# Patient Record
Sex: FEMALE | Race: WHITE | NOT HISPANIC OR LATINO
[De-identification: names, ages, dates, MRNs, and addresses within clinical notes are randomized per-mention and may not be internally consistent; named-entity substitution may affect disease eponyms.]

---

## 2017-01-04 ENCOUNTER — APPOINTMENT (OUTPATIENT)
Dept: INTERNAL MEDICINE | Facility: CLINIC | Age: 82
End: 2017-01-04

## 2017-01-04 VITALS
HEIGHT: 59 IN | TEMPERATURE: 98.2 F | WEIGHT: 130 LBS | HEART RATE: 93 BPM | SYSTOLIC BLOOD PRESSURE: 155 MMHG | DIASTOLIC BLOOD PRESSURE: 65 MMHG | BODY MASS INDEX: 26.21 KG/M2 | OXYGEN SATURATION: 94 %

## 2017-01-04 DIAGNOSIS — R07.89 OTHER CHEST PAIN: ICD-10-CM

## 2017-01-05 LAB
ALBUMIN SERPL ELPH-MCNC: 3.8 G/DL
ALP BLD-CCNC: 65 U/L
ALT SERPL-CCNC: 12 U/L
AMYLASE/CREAT SERPL: 111 U/L
ANION GAP SERPL CALC-SCNC: 11 MMOL/L
AST SERPL-CCNC: 14 U/L
BASOPHILS # BLD AUTO: 0.02 K/UL
BASOPHILS NFR BLD AUTO: 0.2 %
BILIRUB SERPL-MCNC: 0.5 MG/DL
BUN SERPL-MCNC: 24 MG/DL
CALCIUM SERPL-MCNC: 10.2 MG/DL
CHLORIDE SERPL-SCNC: 104 MMOL/L
CO2 SERPL-SCNC: 26 MMOL/L
CREAT SERPL-MCNC: 1.07 MG/DL
EOSINOPHIL # BLD AUTO: 0.06 K/UL
EOSINOPHIL NFR BLD AUTO: 0.6 %
GLUCOSE SERPL-MCNC: 101 MG/DL
HBA1C MFR BLD HPLC: 5.5 %
HCT VFR BLD CALC: 39 %
HGB BLD-MCNC: 12.1 G/DL
IMM GRANULOCYTES NFR BLD AUTO: 0.2 %
LPL SERPL-CCNC: 47 U/L
LYMPHOCYTES # BLD AUTO: 2.06 K/UL
LYMPHOCYTES NFR BLD AUTO: 20.7 %
MAN DIFF?: NORMAL
MCHC RBC-ENTMCNC: 29.3 PG
MCHC RBC-ENTMCNC: 31 GM/DL
MCV RBC AUTO: 94.4 FL
MONOCYTES # BLD AUTO: 0.8 K/UL
MONOCYTES NFR BLD AUTO: 8.1 %
NEUTROPHILS # BLD AUTO: 6.97 K/UL
NEUTROPHILS NFR BLD AUTO: 70.2 %
PLATELET # BLD AUTO: 349 K/UL
POTASSIUM SERPL-SCNC: 4.2 MMOL/L
PROT SERPL-MCNC: 6.3 G/DL
RBC # BLD: 4.13 M/UL
RBC # FLD: 14 %
SODIUM SERPL-SCNC: 141 MMOL/L
WBC # FLD AUTO: 9.93 K/UL

## 2017-01-12 ENCOUNTER — MEDICATION RENEWAL (OUTPATIENT)
Age: 82
End: 2017-01-12

## 2017-01-16 ENCOUNTER — MEDICATION RENEWAL (OUTPATIENT)
Age: 82
End: 2017-01-16

## 2017-04-21 ENCOUNTER — APPOINTMENT (OUTPATIENT)
Dept: INTERNAL MEDICINE | Facility: CLINIC | Age: 82
End: 2017-04-21

## 2017-04-21 ENCOUNTER — MEDICATION RENEWAL (OUTPATIENT)
Age: 82
End: 2017-04-21

## 2017-04-21 VITALS
HEIGHT: 59 IN | SYSTOLIC BLOOD PRESSURE: 160 MMHG | OXYGEN SATURATION: 94 % | TEMPERATURE: 98.5 F | DIASTOLIC BLOOD PRESSURE: 70 MMHG | HEART RATE: 82 BPM

## 2017-04-21 DIAGNOSIS — Z87.09 PERSONAL HISTORY OF OTHER DISEASES OF THE RESPIRATORY SYSTEM: ICD-10-CM

## 2017-04-21 RX ORDER — DICLOFENAC SODIUM 10 MG/G
1 GEL TOPICAL
Qty: 100 | Refills: 0 | Status: COMPLETED | COMMUNITY
Start: 2017-03-13

## 2017-04-21 RX ORDER — PANTOPRAZOLE 40 MG/1
40 TABLET, DELAYED RELEASE ORAL TWICE DAILY
Qty: 60 | Refills: 0 | Status: DISCONTINUED | COMMUNITY
Start: 2017-01-04 | End: 2017-04-21

## 2017-05-16 ENCOUNTER — APPOINTMENT (OUTPATIENT)
Dept: INTERNAL MEDICINE | Facility: CLINIC | Age: 82
End: 2017-05-16

## 2017-05-16 VITALS
SYSTOLIC BLOOD PRESSURE: 193 MMHG | OXYGEN SATURATION: 95 % | HEART RATE: 84 BPM | HEIGHT: 59 IN | DIASTOLIC BLOOD PRESSURE: 76 MMHG | WEIGHT: 132 LBS | BODY MASS INDEX: 26.61 KG/M2

## 2017-05-16 RX ORDER — AZITHROMYCIN 250 MG/1
250 TABLET, FILM COATED ORAL
Qty: 6 | Refills: 1 | Status: DISCONTINUED | COMMUNITY
Start: 2017-04-21 | End: 2017-05-16

## 2017-05-17 LAB
25(OH)D3 SERPL-MCNC: 30.2 NG/ML
ALBUMIN SERPL ELPH-MCNC: 3.9 G/DL
ALP BLD-CCNC: 59 U/L
ALT SERPL-CCNC: 12 U/L
ANION GAP SERPL CALC-SCNC: 15 MMOL/L
AST SERPL-CCNC: 19 U/L
BASOPHILS # BLD AUTO: 0.02 K/UL
BASOPHILS NFR BLD AUTO: 0.2 %
BILIRUB SERPL-MCNC: 0.3 MG/DL
BUN SERPL-MCNC: 18 MG/DL
CALCIUM SERPL-MCNC: 9.9 MG/DL
CHLORIDE SERPL-SCNC: 105 MMOL/L
CHOLEST SERPL-MCNC: 224 MG/DL
CHOLEST/HDLC SERPL: 3.8 RATIO
CO2 SERPL-SCNC: 22 MMOL/L
CREAT SERPL-MCNC: 1.01 MG/DL
CRP SERPL-MCNC: 0.2 MG/DL
EOSINOPHIL # BLD AUTO: 0.16 K/UL
EOSINOPHIL NFR BLD AUTO: 1.8 %
ERYTHROCYTE [SEDIMENTATION RATE] IN BLOOD BY WESTERGREN METHOD: 26 MM/HR
GLUCOSE SERPL-MCNC: 102 MG/DL
HBA1C MFR BLD HPLC: 5.6 %
HCT VFR BLD CALC: 40.1 %
HDLC SERPL-MCNC: 59 MG/DL
HGB BLD-MCNC: 12.1 G/DL
IMM GRANULOCYTES NFR BLD AUTO: 0.2 %
LDLC SERPL CALC-MCNC: 112 MG/DL
LYMPHOCYTES # BLD AUTO: 2.08 K/UL
LYMPHOCYTES NFR BLD AUTO: 23.9 %
MAN DIFF?: NORMAL
MCHC RBC-ENTMCNC: 29.1 PG
MCHC RBC-ENTMCNC: 30.2 GM/DL
MCV RBC AUTO: 96.4 FL
MONOCYTES # BLD AUTO: 0.74 K/UL
MONOCYTES NFR BLD AUTO: 8.5 %
NEUTROPHILS # BLD AUTO: 5.67 K/UL
NEUTROPHILS NFR BLD AUTO: 65.4 %
PLATELET # BLD AUTO: 334 K/UL
POTASSIUM SERPL-SCNC: 4.3 MMOL/L
PROT SERPL-MCNC: 6.2 G/DL
RBC # BLD: 4.16 M/UL
RBC # FLD: 14.8 %
SODIUM SERPL-SCNC: 142 MMOL/L
T4 SERPL-MCNC: 6.7 UG/DL
TRIGL SERPL-MCNC: 266 MG/DL
TSH SERPL-ACNC: 1.37 UIU/ML
WBC # FLD AUTO: 8.69 K/UL

## 2017-05-19 ENCOUNTER — MEDICATION RENEWAL (OUTPATIENT)
Age: 82
End: 2017-05-19

## 2017-05-30 ENCOUNTER — APPOINTMENT (OUTPATIENT)
Dept: COLORECTAL SURGERY | Facility: CLINIC | Age: 82
End: 2017-05-30

## 2017-06-13 ENCOUNTER — APPOINTMENT (OUTPATIENT)
Dept: COLORECTAL SURGERY | Facility: CLINIC | Age: 82
End: 2017-06-13

## 2017-06-13 ENCOUNTER — OTHER (OUTPATIENT)
Age: 82
End: 2017-06-13

## 2017-07-11 ENCOUNTER — APPOINTMENT (OUTPATIENT)
Dept: COLORECTAL SURGERY | Facility: CLINIC | Age: 82
End: 2017-07-11

## 2017-07-11 DIAGNOSIS — K62.89 OTHER SPECIFIED DISEASES OF ANUS AND RECTUM: ICD-10-CM

## 2017-07-11 DIAGNOSIS — K62.5 HEMORRHAGE OF ANUS AND RECTUM: ICD-10-CM

## 2017-07-21 ENCOUNTER — APPOINTMENT (OUTPATIENT)
Dept: INTERNAL MEDICINE | Facility: CLINIC | Age: 82
End: 2017-07-21

## 2017-07-21 VITALS
WEIGHT: 140 LBS | OXYGEN SATURATION: 93 % | DIASTOLIC BLOOD PRESSURE: 63 MMHG | SYSTOLIC BLOOD PRESSURE: 160 MMHG | HEART RATE: 99 BPM | TEMPERATURE: 98.8 F | BODY MASS INDEX: 28.22 KG/M2 | HEIGHT: 59 IN

## 2017-08-01 ENCOUNTER — APPOINTMENT (OUTPATIENT)
Dept: COLORECTAL SURGERY | Facility: CLINIC | Age: 82
End: 2017-08-01
Payer: MEDICARE

## 2017-08-01 DIAGNOSIS — K62.89 OTHER SPECIFIED DISEASES OF ANUS AND RECTUM: ICD-10-CM

## 2017-08-01 PROCEDURE — 46221 LIGATION OF HEMORRHOID(S): CPT

## 2017-08-01 PROCEDURE — 99213 OFFICE O/P EST LOW 20 MIN: CPT | Mod: 25

## 2017-08-08 ENCOUNTER — APPOINTMENT (OUTPATIENT)
Dept: INTERNAL MEDICINE | Facility: CLINIC | Age: 82
End: 2017-08-08
Payer: MEDICARE

## 2017-08-08 VITALS
OXYGEN SATURATION: 94 % | WEIGHT: 140 LBS | SYSTOLIC BLOOD PRESSURE: 172 MMHG | HEART RATE: 78 BPM | BODY MASS INDEX: 28.28 KG/M2 | DIASTOLIC BLOOD PRESSURE: 64 MMHG | TEMPERATURE: 98.4 F

## 2017-08-08 PROCEDURE — 99214 OFFICE O/P EST MOD 30 MIN: CPT

## 2017-08-08 RX ORDER — LISINOPRIL 5 MG/1
5 TABLET ORAL
Qty: 90 | Refills: 1 | Status: DISCONTINUED | COMMUNITY
Start: 2017-08-08 | End: 2017-08-08

## 2017-08-10 LAB — BACTERIA UR CULT: NORMAL

## 2017-08-21 ENCOUNTER — APPOINTMENT (OUTPATIENT)
Dept: INTERNAL MEDICINE | Facility: CLINIC | Age: 82
End: 2017-08-21
Payer: MEDICARE

## 2017-08-21 VITALS
SYSTOLIC BLOOD PRESSURE: 147 MMHG | TEMPERATURE: 98.7 F | DIASTOLIC BLOOD PRESSURE: 78 MMHG | OXYGEN SATURATION: 95 % | BODY MASS INDEX: 26.21 KG/M2 | HEIGHT: 59 IN | WEIGHT: 130 LBS | HEART RATE: 86 BPM

## 2017-08-21 LAB
BILIRUB UR QL STRIP: NEGATIVE
CLARITY UR: CLEAR
COLLECTION METHOD: NORMAL
GLUCOSE UR-MCNC: NEGATIVE
HCG UR QL: 0.2 EU/DL
HGB UR QL STRIP.AUTO: NEGATIVE
KETONES UR-MCNC: NEGATIVE
LEUKOCYTE ESTERASE UR QL STRIP: NORMAL
NITRITE UR QL STRIP: NEGATIVE
PH UR STRIP: 5.5
PROT UR STRIP-MCNC: NEGATIVE
SP GR UR STRIP: 1.01

## 2017-08-21 PROCEDURE — 36415 COLL VENOUS BLD VENIPUNCTURE: CPT

## 2017-08-21 PROCEDURE — 99214 OFFICE O/P EST MOD 30 MIN: CPT | Mod: 25

## 2017-08-21 PROCEDURE — 81002 URINALYSIS NONAUTO W/O SCOPE: CPT

## 2017-08-22 LAB
ALBUMIN SERPL ELPH-MCNC: 3.9 G/DL
ALP BLD-CCNC: 56 U/L
ALT SERPL-CCNC: 16 U/L
ANION GAP SERPL CALC-SCNC: 15 MMOL/L
AST SERPL-CCNC: 20 U/L
BASOPHILS # BLD AUTO: 0.01 K/UL
BASOPHILS NFR BLD AUTO: 0.1 %
BILIRUB SERPL-MCNC: 0.4 MG/DL
BUN SERPL-MCNC: 27 MG/DL
CALCIUM SERPL-MCNC: 10.7 MG/DL
CHLORIDE SERPL-SCNC: 92 MMOL/L
CO2 SERPL-SCNC: 22 MMOL/L
CREAT SERPL-MCNC: 1.22 MG/DL
CRP SERPL-MCNC: 0.2 MG/DL
EOSINOPHIL # BLD AUTO: 0.07 K/UL
EOSINOPHIL NFR BLD AUTO: 0.5 %
GLUCOSE SERPL-MCNC: 112 MG/DL
HCT VFR BLD CALC: 37.8 %
HGB BLD-MCNC: 12.3 G/DL
IMM GRANULOCYTES NFR BLD AUTO: 0.4 %
LYMPHOCYTES # BLD AUTO: 1.44 K/UL
LYMPHOCYTES NFR BLD AUTO: 9.8 %
MAN DIFF?: NORMAL
MCHC RBC-ENTMCNC: 29.7 PG
MCHC RBC-ENTMCNC: 32.5 GM/DL
MCV RBC AUTO: 91.3 FL
MONOCYTES # BLD AUTO: 0.67 K/UL
MONOCYTES NFR BLD AUTO: 4.6 %
NEUTROPHILS # BLD AUTO: 12.47 K/UL
NEUTROPHILS NFR BLD AUTO: 84.6 %
PLATELET # BLD AUTO: 404 K/UL
POTASSIUM SERPL-SCNC: 4.5 MMOL/L
PROT SERPL-MCNC: 6.5 G/DL
RBC # BLD: 4.14 M/UL
RBC # FLD: 14.1 %
SODIUM SERPL-SCNC: 129 MMOL/L
WBC # FLD AUTO: 14.72 K/UL

## 2017-09-19 ENCOUNTER — LABORATORY RESULT (OUTPATIENT)
Age: 82
End: 2017-09-19

## 2017-09-26 ENCOUNTER — APPOINTMENT (OUTPATIENT)
Dept: INTERNAL MEDICINE | Facility: CLINIC | Age: 82
End: 2017-09-26

## 2017-10-31 ENCOUNTER — APPOINTMENT (OUTPATIENT)
Dept: INTERNAL MEDICINE | Facility: CLINIC | Age: 82
End: 2017-10-31
Payer: MEDICARE

## 2017-10-31 VITALS
HEART RATE: 84 BPM | WEIGHT: 131 LBS | TEMPERATURE: 97.7 F | DIASTOLIC BLOOD PRESSURE: 64 MMHG | BODY MASS INDEX: 26.41 KG/M2 | OXYGEN SATURATION: 94 % | SYSTOLIC BLOOD PRESSURE: 173 MMHG | HEIGHT: 59 IN

## 2017-10-31 DIAGNOSIS — N39.0 URINARY TRACT INFECTION, SITE NOT SPECIFIED: ICD-10-CM

## 2017-10-31 LAB
BILIRUB UR QL STRIP: NEGATIVE
GLUCOSE UR-MCNC: NEGATIVE
HCG UR QL: 0.2 EU/DL
HGB UR QL STRIP.AUTO: NEGATIVE
KETONES UR-MCNC: NEGATIVE
LEUKOCYTE ESTERASE UR QL STRIP: NORMAL
NITRITE UR QL STRIP: NEGATIVE
PH UR STRIP: 6.5
PROT UR STRIP-MCNC: NEGATIVE
SP GR UR STRIP: 1.01

## 2017-10-31 PROCEDURE — G0008: CPT

## 2017-10-31 PROCEDURE — 81002 URINALYSIS NONAUTO W/O SCOPE: CPT

## 2017-10-31 PROCEDURE — 99214 OFFICE O/P EST MOD 30 MIN: CPT | Mod: 25

## 2017-10-31 PROCEDURE — 90662 IIV NO PRSV INCREASED AG IM: CPT

## 2017-10-31 PROCEDURE — 36415 COLL VENOUS BLD VENIPUNCTURE: CPT

## 2017-11-01 LAB
25(OH)D3 SERPL-MCNC: 43.2 NG/ML
ALBUMIN SERPL ELPH-MCNC: 4 G/DL
ALP BLD-CCNC: 59 U/L
ALT SERPL-CCNC: 17 U/L
ANION GAP SERPL CALC-SCNC: 18 MMOL/L
AST SERPL-CCNC: 21 U/L
BASOPHILS # BLD AUTO: 0.02 K/UL
BASOPHILS NFR BLD AUTO: 0.2 %
BILIRUB SERPL-MCNC: 0.6 MG/DL
BUN SERPL-MCNC: 19 MG/DL
CALCIUM SERPL-MCNC: 10.9 MG/DL
CHLORIDE SERPL-SCNC: 97 MMOL/L
CHOLEST SERPL-MCNC: 204 MG/DL
CHOLEST/HDLC SERPL: 3.2 RATIO
CO2 SERPL-SCNC: 24 MMOL/L
CREAT SERPL-MCNC: 1.16 MG/DL
CRP SERPL-MCNC: 0.2 MG/DL
EOSINOPHIL # BLD AUTO: 0.1 K/UL
EOSINOPHIL NFR BLD AUTO: 0.9 %
ERYTHROCYTE [SEDIMENTATION RATE] IN BLOOD BY WESTERGREN METHOD: 25 MM/HR
GLUCOSE SERPL-MCNC: 105 MG/DL
HBA1C MFR BLD HPLC: 5.6 %
HCT VFR BLD CALC: 38.4 %
HDLC SERPL-MCNC: 64 MG/DL
HGB BLD-MCNC: 11.9 G/DL
IMM GRANULOCYTES NFR BLD AUTO: 0.4 %
LDLC SERPL CALC-MCNC: 101 MG/DL
LYMPHOCYTES # BLD AUTO: 2.58 K/UL
LYMPHOCYTES NFR BLD AUTO: 22.5 %
MAN DIFF?: NORMAL
MCHC RBC-ENTMCNC: 29.4 PG
MCHC RBC-ENTMCNC: 31 GM/DL
MCV RBC AUTO: 94.8 FL
MONOCYTES # BLD AUTO: 1.07 K/UL
MONOCYTES NFR BLD AUTO: 9.3 %
NEUTROPHILS # BLD AUTO: 7.67 K/UL
NEUTROPHILS NFR BLD AUTO: 66.7 %
PLATELET # BLD AUTO: 421 K/UL
POTASSIUM SERPL-SCNC: 4.2 MMOL/L
PROT SERPL-MCNC: 6.8 G/DL
RBC # BLD: 4.05 M/UL
RBC # FLD: 14.5 %
SODIUM SERPL-SCNC: 139 MMOL/L
T4 SERPL-MCNC: 7.4 UG/DL
TRIGL SERPL-MCNC: 193 MG/DL
TSH SERPL-ACNC: 0.98 UIU/ML
WBC # FLD AUTO: 11.49 K/UL

## 2017-11-02 RX ORDER — HYDROCORTISONE 25 MG/G
2.5 CREAM TOPICAL
Qty: 30 | Refills: 0 | Status: COMPLETED | COMMUNITY
Start: 2017-05-04

## 2017-11-02 RX ORDER — LOSARTAN POTASSIUM 50 MG/1
50 TABLET, FILM COATED ORAL
Qty: 90 | Refills: 0 | Status: COMPLETED | COMMUNITY
Start: 2017-06-24

## 2017-11-02 RX ORDER — PRAMOXINE HYDROCHLORIDE AND HYDROCORTISONE ACETATE 10; 25 MG/G; MG/G
2.5-1 CREAM TOPICAL
Qty: 1 | Refills: 5 | Status: DISCONTINUED | COMMUNITY
Start: 2017-05-30 | End: 2017-11-02

## 2017-11-02 RX ORDER — AMLODIPINE BESYLATE 5 MG/1
5 TABLET ORAL
Qty: 90 | Refills: 0 | Status: COMPLETED | COMMUNITY
Start: 2017-05-19

## 2017-12-04 ENCOUNTER — APPOINTMENT (OUTPATIENT)
Dept: INTERNAL MEDICINE | Facility: CLINIC | Age: 82
End: 2017-12-04

## 2017-12-19 ENCOUNTER — MEDICATION RENEWAL (OUTPATIENT)
Age: 82
End: 2017-12-19

## 2018-03-13 ENCOUNTER — MEDICATION RENEWAL (OUTPATIENT)
Age: 83
End: 2018-03-13

## 2018-06-19 ENCOUNTER — RX RENEWAL (OUTPATIENT)
Age: 83
End: 2018-06-19

## 2018-07-01 ENCOUNTER — MOBILE ON CALL (OUTPATIENT)
Age: 83
End: 2018-07-01

## 2018-07-25 ENCOUNTER — APPOINTMENT (OUTPATIENT)
Dept: INTERNAL MEDICINE | Facility: CLINIC | Age: 83
End: 2018-07-25
Payer: MEDICARE

## 2018-07-25 ENCOUNTER — NON-APPOINTMENT (OUTPATIENT)
Age: 83
End: 2018-07-25

## 2018-07-25 VITALS
HEIGHT: 59 IN | BODY MASS INDEX: 26.61 KG/M2 | SYSTOLIC BLOOD PRESSURE: 165 MMHG | HEART RATE: 83 BPM | DIASTOLIC BLOOD PRESSURE: 78 MMHG | OXYGEN SATURATION: 96 % | TEMPERATURE: 98.1 F | WEIGHT: 132 LBS

## 2018-07-25 DIAGNOSIS — R21 RASH AND OTHER NONSPECIFIC SKIN ERUPTION: ICD-10-CM

## 2018-07-25 PROCEDURE — G0439: CPT

## 2018-07-25 PROCEDURE — 93000 ELECTROCARDIOGRAM COMPLETE: CPT

## 2018-07-25 PROCEDURE — 99214 OFFICE O/P EST MOD 30 MIN: CPT | Mod: 25

## 2018-07-25 PROCEDURE — 36415 COLL VENOUS BLD VENIPUNCTURE: CPT

## 2018-07-25 RX ORDER — DONEPEZIL HYDROCHLORIDE 5 MG/1
5 TABLET ORAL
Qty: 30 | Refills: 5 | Status: DISCONTINUED | COMMUNITY
Start: 2017-12-19 | End: 2018-07-25

## 2018-07-25 NOTE — HISTORY OF PRESENT ILLNESS
[FreeTextEntry1] : Annual exam [de-identified] : Annual exam\par had all vaccine\par aged out of other health maintenance\par \par Problems with walking, balance and strength\par a lot of arthitic issue despite small dose of steroid\par blood pressure on med\par pmr on prednisone\par edema controlled by every other day\par bruise easily on persantin, fish oil\par some eccymosis legs\par no sob or chest pain\par urination ok

## 2018-07-25 NOTE — PLAN
[FreeTextEntry1] : Annual exam\par Had all vaccine\par aged out of other health maintenance\par \par \par Blood pressure good\par pmr on prednisone\par rash(vascular) from perssntin, fish oil and prednisone\par no recent uti\par bloods and ekg

## 2018-07-25 NOTE — PHYSICAL EXAM
[No Acute Distress] : no acute distress [Well Nourished] : well nourished [Normal Sclera/Conjunctiva] : normal sclera/conjunctiva [PERRL] : pupils equal round and reactive to light [Normal Outer Ear/Nose] : the outer ears and nose were normal in appearance [Normal Oropharynx] : the oropharynx was normal [No JVD] : no jugular venous distention [Supple] : supple [No Respiratory Distress] : no respiratory distress  [Clear to Auscultation] : lungs were clear to auscultation bilaterally [Normal Rate] : normal rate  [Regular Rhythm] : with a regular rhythm [Soft] : abdomen soft [No HSM] : no HSM

## 2018-07-26 LAB
24R-OH-CALCIDIOL SERPL-MCNC: 30.9 PG/ML
25(OH)D3 SERPL-MCNC: 41 NG/ML
ALBUMIN SERPL ELPH-MCNC: 3.9 G/DL
ALP BLD-CCNC: 54 U/L
ALT SERPL-CCNC: 12 U/L
ANION GAP SERPL CALC-SCNC: 15 MMOL/L
AST SERPL-CCNC: 17 U/L
BASOPHILS # BLD AUTO: 0.02 K/UL
BASOPHILS NFR BLD AUTO: 0.2 %
BILIRUB SERPL-MCNC: 0.5 MG/DL
BUN SERPL-MCNC: 29 MG/DL
CALCIUM SERPL-MCNC: 10.4 MG/DL
CHLORIDE SERPL-SCNC: 101 MMOL/L
CHOLEST SERPL-MCNC: 196 MG/DL
CHOLEST/HDLC SERPL: 3.4 RATIO
CO2 SERPL-SCNC: 26 MMOL/L
CREAT SERPL-MCNC: 1.29 MG/DL
EOSINOPHIL # BLD AUTO: 0.07 K/UL
EOSINOPHIL NFR BLD AUTO: 0.8 %
GLUCOSE SERPL-MCNC: 101 MG/DL
HBA1C MFR BLD HPLC: 5.6 %
HCT VFR BLD CALC: 37.8 %
HDLC SERPL-MCNC: 57 MG/DL
HGB BLD-MCNC: 11.7 G/DL
IMM GRANULOCYTES NFR BLD AUTO: 0.3 %
LDLC SERPL CALC-MCNC: 99 MG/DL
LYMPHOCYTES # BLD AUTO: 2.26 K/UL
LYMPHOCYTES NFR BLD AUTO: 24.5 %
MAN DIFF?: NORMAL
MCHC RBC-ENTMCNC: 28.8 PG
MCHC RBC-ENTMCNC: 31 GM/DL
MCV RBC AUTO: 93.1 FL
MONOCYTES # BLD AUTO: 0.75 K/UL
MONOCYTES NFR BLD AUTO: 8.1 %
NEUTROPHILS # BLD AUTO: 6.08 K/UL
NEUTROPHILS NFR BLD AUTO: 66.1 %
PLATELET # BLD AUTO: 387 K/UL
POTASSIUM SERPL-SCNC: 4.5 MMOL/L
PROT SERPL-MCNC: 6.4 G/DL
RBC # BLD: 4.06 M/UL
RBC # FLD: 14.2 %
SODIUM SERPL-SCNC: 142 MMOL/L
T4 SERPL-MCNC: 6.8 UG/DL
TRIGL SERPL-MCNC: 200 MG/DL
TSH SERPL-ACNC: 0.73 UIU/ML
WBC # FLD AUTO: 9.21 K/UL

## 2018-08-02 ENCOUNTER — RX RENEWAL (OUTPATIENT)
Age: 83
End: 2018-08-02

## 2018-08-02 ENCOUNTER — MEDICATION RENEWAL (OUTPATIENT)
Age: 83
End: 2018-08-02

## 2018-09-12 ENCOUNTER — APPOINTMENT (OUTPATIENT)
Dept: COLORECTAL SURGERY | Facility: CLINIC | Age: 83
End: 2018-09-12
Payer: MEDICARE

## 2018-09-12 VITALS
HEIGHT: 59 IN | SYSTOLIC BLOOD PRESSURE: 171 MMHG | WEIGHT: 132 LBS | HEART RATE: 91 BPM | DIASTOLIC BLOOD PRESSURE: 68 MMHG | OXYGEN SATURATION: 98 % | BODY MASS INDEX: 26.61 KG/M2

## 2018-09-12 PROCEDURE — 46221 LIGATION OF HEMORRHOID(S): CPT

## 2018-09-12 PROCEDURE — 99213 OFFICE O/P EST LOW 20 MIN: CPT | Mod: 25

## 2018-09-17 ENCOUNTER — RX RENEWAL (OUTPATIENT)
Age: 83
End: 2018-09-17

## 2018-09-28 ENCOUNTER — APPOINTMENT (OUTPATIENT)
Dept: INTERNAL MEDICINE | Facility: CLINIC | Age: 83
End: 2018-09-28
Payer: MEDICARE

## 2018-09-28 VITALS
SYSTOLIC BLOOD PRESSURE: 167 MMHG | HEIGHT: 59 IN | HEART RATE: 74 BPM | DIASTOLIC BLOOD PRESSURE: 64 MMHG | TEMPERATURE: 98.4 F | OXYGEN SATURATION: 95 %

## 2018-09-28 PROCEDURE — 36415 COLL VENOUS BLD VENIPUNCTURE: CPT

## 2018-09-28 PROCEDURE — 99214 OFFICE O/P EST MOD 30 MIN: CPT | Mod: 25

## 2018-09-28 PROCEDURE — 90662 IIV NO PRSV INCREASED AG IM: CPT

## 2018-09-28 PROCEDURE — G0008: CPT

## 2018-09-28 NOTE — REVIEW OF SYSTEMS
[Fever] : no fever [Night Sweats] : no night sweats [Discharge] : no discharge [Vision Problems] : no vision problems [Earache] : no earache [Nasal Discharge] : no nasal discharge [Chest Pain] : no chest pain [Orthopnea] : no orthopnea [Shortness Of Breath] : no shortness of breath [Wheezing] : no wheezing

## 2018-09-28 NOTE — HISTORY OF PRESENT ILLNESS
[FreeTextEntry1] : Dizziness/vertigo [de-identified] : Vertigo for over 50 days\par on 6 meclizine 12.5 daily\par has seen neuro and ent\par also some neck pain\par high blood pressure\par pmr on prednisone

## 2018-09-28 NOTE — PHYSICAL EXAM
[No Acute Distress] : no acute distress [Well Nourished] : well nourished [Normal Outer Ear/Nose] : the outer ears and nose were normal in appearance [Normal Oropharynx] : the oropharynx was normal [No JVD] : no jugular venous distention [Supple] : supple [No Respiratory Distress] : no respiratory distress  [Normal Rate] : normal rate  [Regular Rhythm] : with a regular rhythm [Soft] : abdomen soft [No HSM] : no HSM

## 2018-09-30 LAB
25(OH)D3 SERPL-MCNC: 46.6 NG/ML
ALBUMIN SERPL ELPH-MCNC: 4.3 G/DL
ALP BLD-CCNC: 61 U/L
ALT SERPL-CCNC: 15 U/L
ANION GAP SERPL CALC-SCNC: 16 MMOL/L
AST SERPL-CCNC: 17 U/L
BILIRUB SERPL-MCNC: 0.4 MG/DL
BUN SERPL-MCNC: 33 MG/DL
CALCIUM SERPL-MCNC: 11.2 MG/DL
CHLORIDE SERPL-SCNC: 98 MMOL/L
CHOLEST SERPL-MCNC: 202 MG/DL
CHOLEST/HDLC SERPL: 3.2 RATIO
CO2 SERPL-SCNC: 24 MMOL/L
CREAT SERPL-MCNC: 1.21 MG/DL
CRP SERPL-MCNC: 0.33 MG/DL
GLUCOSE SERPL-MCNC: 102 MG/DL
HDLC SERPL-MCNC: 63 MG/DL
LDLC SERPL CALC-MCNC: 102 MG/DL
POTASSIUM SERPL-SCNC: 3.8 MMOL/L
PROT SERPL-MCNC: 6.7 G/DL
SODIUM SERPL-SCNC: 138 MMOL/L
T4 SERPL-MCNC: 7.3 UG/DL
TRIGL SERPL-MCNC: 187 MG/DL
TSH SERPL-ACNC: 0.81 UIU/ML

## 2018-10-09 ENCOUNTER — MEDICATION RENEWAL (OUTPATIENT)
Age: 83
End: 2018-10-09

## 2018-10-16 RX ORDER — MELOXICAM 7.5 MG/1
7.5 TABLET ORAL
Qty: 30 | Refills: 2 | Status: DISCONTINUED | COMMUNITY
Start: 2018-10-09 | End: 2018-10-16

## 2018-11-02 ENCOUNTER — MED ADMIN CHARGE (OUTPATIENT)
Age: 83
End: 2018-11-02

## 2018-11-02 ENCOUNTER — MEDICATION RENEWAL (OUTPATIENT)
Age: 83
End: 2018-11-02

## 2019-04-05 ENCOUNTER — MEDICATION RENEWAL (OUTPATIENT)
Age: 84
End: 2019-04-05

## 2019-04-26 ENCOUNTER — APPOINTMENT (OUTPATIENT)
Dept: INTERNAL MEDICINE | Facility: CLINIC | Age: 84
End: 2019-04-26
Payer: MEDICARE

## 2019-04-26 VITALS
HEIGHT: 59 IN | WEIGHT: 134 LBS | SYSTOLIC BLOOD PRESSURE: 169 MMHG | BODY MASS INDEX: 27.01 KG/M2 | OXYGEN SATURATION: 95 % | DIASTOLIC BLOOD PRESSURE: 76 MMHG | HEART RATE: 84 BPM

## 2019-04-26 DIAGNOSIS — R42 DIZZINESS AND GIDDINESS: ICD-10-CM

## 2019-04-26 DIAGNOSIS — E83.52 HYPERCALCEMIA: ICD-10-CM

## 2019-04-26 DIAGNOSIS — M19.90 UNSPECIFIED OSTEOARTHRITIS, UNSPECIFIED SITE: ICD-10-CM

## 2019-04-26 PROCEDURE — 36415 COLL VENOUS BLD VENIPUNCTURE: CPT

## 2019-04-26 PROCEDURE — 99214 OFFICE O/P EST MOD 30 MIN: CPT | Mod: 25

## 2019-04-26 RX ORDER — DIPYRIDAMOLE 25 MG/1
25 TABLET, FILM COATED ORAL TWICE DAILY
Qty: 14 | Refills: 0 | Status: DISCONTINUED | COMMUNITY
Start: 2018-11-02 | End: 2019-04-26

## 2019-04-26 RX ORDER — NITROFURANTOIN MACROCRYSTALS 100 MG/1
100 CAPSULE ORAL
Qty: 14 | Refills: 1 | Status: DISCONTINUED | COMMUNITY
Start: 2017-11-02 | End: 2019-04-26

## 2019-04-26 RX ORDER — HYDROCORTISONE 25 MG/G
2.5 CREAM TOPICAL
Qty: 30 | Refills: 0 | Status: DISCONTINUED | COMMUNITY
Start: 2017-06-24 | End: 2019-04-26

## 2019-04-26 NOTE — REVIEW OF SYSTEMS
[Nasal Discharge] : no nasal discharge [Chest Pain] : no chest pain [Orthopnea] : no orthopnea [Shortness Of Breath] : no shortness of breath [Abdominal Pain] : no abdominal pain [Muscle Pain] : muscle pain [Joint Pain] : joint pain [Itching] : Itching

## 2019-04-26 NOTE — HISTORY OF PRESENT ILLNESS
[FreeTextEntry1] : bp check [de-identified] : bp check and pmr\par physical therapy frequently holds therapy for bp issue\par allergies on claritin and azelastin doing well

## 2019-04-26 NOTE — PLAN
[FreeTextEntry1] : BP ok\par pmr on steroid\par chronic anxiety\par last blood high calcium to repeat and f/u on hctz?\par continue pt\par dizziness on meclizine\par

## 2019-04-26 NOTE — PHYSICAL EXAM
[No Acute Distress] : no acute distress [Normal Outer Ear/Nose] : the outer ears and nose were normal in appearance [Normal Oropharynx] : the oropharynx was normal [No JVD] : no jugular venous distention [No Respiratory Distress] : no respiratory distress  [Supple] : supple [Clear to Auscultation] : lungs were clear to auscultation bilaterally [Normal Rate] : normal rate  [Regular Rhythm] : with a regular rhythm [No Edema] : there was no peripheral edema [No Extremity Clubbing/Cyanosis] : no extremity clubbing/cyanosis [Soft] : abdomen soft [No HSM] : no HSM [No Joint Swelling] : no joint swelling [de-identified] : easy bruising

## 2019-04-27 LAB
24R-OH-CALCIDIOL SERPL-MCNC: 32.7 PG/ML
25(OH)D3 SERPL-MCNC: 43.7 NG/ML
ALBUMIN SERPL ELPH-MCNC: 4 G/DL
ALP BLD-CCNC: 51 U/L
ALT SERPL-CCNC: 15 U/L
ANION GAP SERPL CALC-SCNC: 12 MMOL/L
AST SERPL-CCNC: 15 U/L
BASOPHILS # BLD AUTO: 0.03 K/UL
BASOPHILS NFR BLD AUTO: 0.2 %
BILIRUB SERPL-MCNC: 0.4 MG/DL
BUN SERPL-MCNC: 29 MG/DL
CA-I SERPL-SCNC: 1.38 MMOL/L
CALCIUM SERPL-MCNC: 10.4 MG/DL
CALCIUM SERPL-MCNC: 10.4 MG/DL
CHLORIDE SERPL-SCNC: 106 MMOL/L
CHOLEST SERPL-MCNC: 178 MG/DL
CHOLEST/HDLC SERPL: 3.2 RATIO
CO2 SERPL-SCNC: 24 MMOL/L
CREAT SERPL-MCNC: 1.11 MG/DL
CRP SERPL-MCNC: 0.12 MG/DL
EOSINOPHIL # BLD AUTO: 0.03 K/UL
EOSINOPHIL NFR BLD AUTO: 0.2 %
ERYTHROCYTE [SEDIMENTATION RATE] IN BLOOD BY WESTERGREN METHOD: 25 MM/HR
ESTIMATED AVERAGE GLUCOSE: 117 MG/DL
GLUCOSE SERPL-MCNC: 106 MG/DL
HBA1C MFR BLD HPLC: 5.7 %
HCT VFR BLD CALC: 38.8 %
HDLC SERPL-MCNC: 55 MG/DL
HGB BLD-MCNC: 11.9 G/DL
IMM GRANULOCYTES NFR BLD AUTO: 0.6 %
LDLC SERPL CALC-MCNC: 79 MG/DL
LYMPHOCYTES # BLD AUTO: 1.43 K/UL
LYMPHOCYTES NFR BLD AUTO: 9.9 %
MAN DIFF?: NORMAL
MCHC RBC-ENTMCNC: 29.8 PG
MCHC RBC-ENTMCNC: 30.7 GM/DL
MCV RBC AUTO: 97.2 FL
MONOCYTES # BLD AUTO: 0.51 K/UL
MONOCYTES NFR BLD AUTO: 3.5 %
NEUTROPHILS # BLD AUTO: 12.4 K/UL
NEUTROPHILS NFR BLD AUTO: 85.6 %
PARATHYROID HORMONE INTACT: 141 PG/ML
PHOSPHATE SERPL-MCNC: 2.6 MG/DL
PLATELET # BLD AUTO: 323 K/UL
POTASSIUM SERPL-SCNC: 4.6 MMOL/L
PROT SERPL-MCNC: 6.3 G/DL
RBC # BLD: 3.99 M/UL
RBC # FLD: 14.6 %
SODIUM SERPL-SCNC: 142 MMOL/L
T4 FREE SERPL-MCNC: 1.1 NG/DL
TRIGL SERPL-MCNC: 220 MG/DL
TSH SERPL-ACNC: 0.85 UIU/ML
WBC # FLD AUTO: 14.48 K/UL

## 2019-05-02 ENCOUNTER — MEDICATION RENEWAL (OUTPATIENT)
Age: 84
End: 2019-05-02

## 2019-07-09 ENCOUNTER — APPOINTMENT (OUTPATIENT)
Dept: INTERNAL MEDICINE | Facility: CLINIC | Age: 84
End: 2019-07-09
Payer: MEDICARE

## 2019-07-09 VITALS
DIASTOLIC BLOOD PRESSURE: 78 MMHG | BODY MASS INDEX: 26.21 KG/M2 | HEIGHT: 59 IN | SYSTOLIC BLOOD PRESSURE: 160 MMHG | HEART RATE: 84 BPM | TEMPERATURE: 98.4 F | WEIGHT: 130 LBS | OXYGEN SATURATION: 95 %

## 2019-07-09 DIAGNOSIS — M79.89 OTHER SPECIFIED SOFT TISSUE DISORDERS: ICD-10-CM

## 2019-07-09 PROCEDURE — 36415 COLL VENOUS BLD VENIPUNCTURE: CPT

## 2019-07-09 PROCEDURE — 99214 OFFICE O/P EST MOD 30 MIN: CPT | Mod: 25

## 2019-07-09 NOTE — PHYSICAL EXAM
[No Acute Distress] : no acute distress [Well Nourished] : well nourished [No JVD] : no jugular venous distention [No Lymphadenopathy] : no lymphadenopathy [No Respiratory Distress] : no respiratory distress  [Normal Rate] : normal rate  [No Accessory Muscle Use] : no accessory muscle use [No HSM] : no HSM [Regular Rhythm] : with a regular rhythm [Normal Bowel Sounds] : normal bowel sounds [de-identified] : feet swollen right greater than left [de-identified] : bruise easily

## 2019-07-09 NOTE — PLAN
[FreeTextEntry1] : foot edema and bp high\par increase hctz from 25 daily to 50 daily\par Pmr\par continue pt\par check blood, sed rate etc\par check uric acid

## 2019-07-09 NOTE — HISTORY OF PRESENT ILLNESS
[FreeTextEntry1] : Blood pressure  pmr and swollen feet [de-identified] : swollen feet\par bruise easily\par trouble walking use cane\par had trigger point injections with no help\par on prednisone for pmr\par had recent hemorrhoids

## 2019-07-09 NOTE — REVIEW OF SYSTEMS
[Fever] : no fever [Earache] : no earache [Chest Pain] : no chest pain [Nasal Discharge] : no nasal discharge [Shortness Of Breath] : no shortness of breath [Orthopnea] : no orthopnea [Wheezing] : no wheezing [Abdominal Pain] : no abdominal pain [Muscle Pain] : muscle pain [Vomiting] : no vomiting [Back Pain] : back pain

## 2019-07-10 LAB
24R-OH-CALCIDIOL SERPL-MCNC: 42.1 PG/ML
25(OH)D3 SERPL-MCNC: 37.6 NG/ML
ALBUMIN SERPL ELPH-MCNC: 3.9 G/DL
ALP BLD-CCNC: 60 U/L
ALT SERPL-CCNC: 17 U/L
ANION GAP SERPL CALC-SCNC: 12 MMOL/L
AST SERPL-CCNC: 11 U/L
BASOPHILS # BLD AUTO: 0.05 K/UL
BASOPHILS NFR BLD AUTO: 0.3 %
BILIRUB SERPL-MCNC: 0.2 MG/DL
BUN SERPL-MCNC: 40 MG/DL
CALCIUM SERPL-MCNC: 10.1 MG/DL
CHLORIDE SERPL-SCNC: 105 MMOL/L
CHOLEST SERPL-MCNC: 176 MG/DL
CHOLEST/HDLC SERPL: 2.6 RATIO
CO2 SERPL-SCNC: 27 MMOL/L
CREAT SERPL-MCNC: 1.35 MG/DL
CRP SERPL-MCNC: 0.32 MG/DL
EOSINOPHIL # BLD AUTO: 0.04 K/UL
EOSINOPHIL NFR BLD AUTO: 0.3 %
ERYTHROCYTE [SEDIMENTATION RATE] IN BLOOD BY WESTERGREN METHOD: 13 MM/HR
ESTIMATED AVERAGE GLUCOSE: 117 MG/DL
FOLATE SERPL-MCNC: >20 NG/ML
GLUCOSE SERPL-MCNC: 103 MG/DL
HBA1C MFR BLD HPLC: 5.7 %
HCT VFR BLD CALC: 38.5 %
HDLC SERPL-MCNC: 69 MG/DL
HGB BLD-MCNC: 11.9 G/DL
IMM GRANULOCYTES NFR BLD AUTO: 1.2 %
INR PPP: 0.87 RATIO
LDLC SERPL CALC-MCNC: 85 MG/DL
LYMPHOCYTES # BLD AUTO: 1.96 K/UL
LYMPHOCYTES NFR BLD AUTO: 12.7 %
MAN DIFF?: NORMAL
MCHC RBC-ENTMCNC: 29.9 PG
MCHC RBC-ENTMCNC: 30.9 GM/DL
MCV RBC AUTO: 96.7 FL
MONOCYTES # BLD AUTO: 1.02 K/UL
MONOCYTES NFR BLD AUTO: 6.6 %
NEUTROPHILS # BLD AUTO: 12.24 K/UL
NEUTROPHILS NFR BLD AUTO: 78.9 %
NT-PROBNP SERPL-MCNC: 440 PG/ML
PLATELET # BLD AUTO: 351 K/UL
POTASSIUM SERPL-SCNC: 4.8 MMOL/L
PROT SERPL-MCNC: 6 G/DL
PT BLD: 9.8 SEC
RBC # BLD: 3.98 M/UL
RBC # FLD: 14.8 %
SODIUM SERPL-SCNC: 144 MMOL/L
T4 FREE SERPL-MCNC: 1.1 NG/DL
TRIGL SERPL-MCNC: 110 MG/DL
TSH SERPL-ACNC: 0.48 UIU/ML
URATE SERPL-MCNC: 7.9 MG/DL
VIT B12 SERPL-MCNC: 281 PG/ML
WBC # FLD AUTO: 15.49 K/UL

## 2019-07-16 ENCOUNTER — MEDICATION RENEWAL (OUTPATIENT)
Age: 84
End: 2019-07-16

## 2019-07-16 RX ORDER — HYDROCHLOROTHIAZIDE 50 MG/1
50 TABLET ORAL DAILY
Qty: 90 | Refills: 3 | Status: DISCONTINUED | COMMUNITY
Start: 2017-07-21 | End: 2019-07-16

## 2019-07-24 ENCOUNTER — APPOINTMENT (OUTPATIENT)
Dept: COLORECTAL SURGERY | Facility: CLINIC | Age: 84
End: 2019-07-24
Payer: MEDICARE

## 2019-07-24 PROCEDURE — 99214 OFFICE O/P EST MOD 30 MIN: CPT | Mod: 25

## 2019-07-24 PROCEDURE — 46221 LIGATION OF HEMORRHOID(S): CPT

## 2019-07-24 NOTE — HISTORY OF PRESENT ILLNESS
[FreeTextEntry1] : 89-year-old female known to the office for a couple episodes of bleeding internal hemorrhoids. She complains of 2 days of recurrent symptoms.

## 2019-07-24 NOTE — ASSESSMENT
[FreeTextEntry1] : 89-year-old female with recurrent bleeding internal hemorrhoids. Now status post repeat left lateral internal banding. period

## 2019-07-24 NOTE — PHYSICAL EXAM
[None] : no anal fissures seen [Multiple Sinus Tracts] : no perianal sinus tracts [Excoriation] : no perianal excoriation [Wart] : no warts [Fistula] : no fistulas [Pilonidal Cyst] : no pilonidal cysts [Ulcer ___ cm] : no ulcers [Pilonidal Sinus Draining] : no pilonidal sinus drainage [Pilonidal Sinus] : no pilonidal sinus [Nonprolapsing] : a nonprolapsing (grade I) [Tender, Swollen] : tender, swollen [Skin Tags] : residual hemorrhoidal skin tags were noted [Thrombosed] : that was not thrombosed [Lax] : was lax [Normal Heart Sounds] : normal heart sounds [Wheezing] : no wheezing was heard [Normal Breath Sounds] : Normal breath sounds [Normal Rate and Rhythm] : normal rate and rhythm [Purpura] : no purpura  [Skin Ulcer] : no ulcer [Petechiae] : no petechiae [Skin Induration] : no induration [Alert] : alert [Oriented to Person] : oriented to person [Oriented to Place] : oriented to place [Calm] : calm [Oriented to Time] : oriented to time [de-identified] : Benign abdomen [de-identified] : Swollen internal hemorrhoids [de-identified] : No apparent distress [de-identified] : Pupils equal round and reactive to light and accommodation

## 2019-08-30 ENCOUNTER — MEDICATION RENEWAL (OUTPATIENT)
Age: 84
End: 2019-08-30

## 2019-08-30 DIAGNOSIS — L03.90 CELLULITIS, UNSPECIFIED: ICD-10-CM

## 2019-09-03 ENCOUNTER — APPOINTMENT (OUTPATIENT)
Dept: INTERNAL MEDICINE | Facility: CLINIC | Age: 84
End: 2019-09-03
Payer: MEDICARE

## 2019-09-03 VITALS
TEMPERATURE: 97.8 F | SYSTOLIC BLOOD PRESSURE: 165 MMHG | DIASTOLIC BLOOD PRESSURE: 61 MMHG | OXYGEN SATURATION: 93 % | HEART RATE: 102 BPM | HEIGHT: 59 IN

## 2019-09-03 DIAGNOSIS — R60.0 LOCALIZED EDEMA: ICD-10-CM

## 2019-09-03 LAB
BILIRUB UR QL STRIP: NEGATIVE
CLARITY UR: CLEAR
COLLECTION METHOD: NORMAL
GLUCOSE UR-MCNC: NEGATIVE
HCG UR QL: 0.2 EU/DL
HGB UR QL STRIP.AUTO: NEGATIVE
KETONES UR-MCNC: NEGATIVE
LEUKOCYTE ESTERASE UR QL STRIP: NORMAL
NITRITE UR QL STRIP: NEGATIVE
PH UR STRIP: 5
PROT UR STRIP-MCNC: NEGATIVE
SP GR UR STRIP: 1.01

## 2019-09-03 PROCEDURE — 99213 OFFICE O/P EST LOW 20 MIN: CPT

## 2019-09-03 PROCEDURE — 81002 URINALYSIS NONAUTO W/O SCOPE: CPT

## 2019-09-03 NOTE — REVIEW OF SYSTEMS
[Fever] : no fever [Night Sweats] : no night sweats [Nasal Discharge] : no nasal discharge [Earache] : no earache [Chest Pain] : no chest pain [Orthopnea] : no orthopnea [Dysuria] : no dysuria [Hematuria] : no hematuria [Joint Pain] : no joint pain [Joint Stiffness] : no joint stiffness [Muscle Pain] : muscle pain

## 2019-09-03 NOTE — PHYSICAL EXAM
[No Acute Distress] : no acute distress [No JVD] : no jugular venous distention [Well Nourished] : well nourished [No Lymphadenopathy] : no lymphadenopathy [No Respiratory Distress] : no respiratory distress  [No Accessory Muscle Use] : no accessory muscle use [Normal Rate] : normal rate  [Regular Rhythm] : with a regular rhythm [de-identified] : edema calf and 6 inch up with some weeping no reddness

## 2019-09-03 NOTE — HISTORY OF PRESENT ILLNESS
[FreeTextEntry1] : f/u weeping legs [de-identified] : Legs weeping\par on keflex and lasix\par also amlodipine 2.5 and losartan 100\par feel ok anxious

## 2019-09-03 NOTE — PLAN
[FreeTextEntry1] : weeping from edema no infection\par stop amlodipine and losartan\par start valsartan 160\par stop keflex\par elevate legs\par increase protein\par \par

## 2019-09-07 LAB — BACTERIA UR CULT: ABNORMAL

## 2019-10-02 ENCOUNTER — APPOINTMENT (OUTPATIENT)
Dept: INTERNAL MEDICINE | Facility: CLINIC | Age: 84
End: 2019-10-02
Payer: MEDICARE

## 2019-10-02 VITALS — SYSTOLIC BLOOD PRESSURE: 130 MMHG | DIASTOLIC BLOOD PRESSURE: 78 MMHG

## 2019-10-02 PROCEDURE — G0008: CPT

## 2019-10-02 PROCEDURE — 90662 IIV NO PRSV INCREASED AG IM: CPT

## 2019-10-02 PROCEDURE — 99213 OFFICE O/P EST LOW 20 MIN: CPT | Mod: 25

## 2019-10-02 PROCEDURE — 36415 COLL VENOUS BLD VENIPUNCTURE: CPT

## 2019-10-02 RX ORDER — CEPHALEXIN 500 MG/1
500 CAPSULE ORAL
Qty: 28 | Refills: 2 | Status: DISCONTINUED | COMMUNITY
Start: 2019-08-30 | End: 2019-10-02

## 2019-10-03 LAB
ALBUMIN SERPL ELPH-MCNC: 4 G/DL
ALP BLD-CCNC: 57 U/L
ALT SERPL-CCNC: 15 U/L
ANION GAP SERPL CALC-SCNC: 15 MMOL/L
AST SERPL-CCNC: 17 U/L
BASOPHILS # BLD AUTO: 0.04 K/UL
BASOPHILS NFR BLD AUTO: 0.3 %
BILIRUB SERPL-MCNC: 0.2 MG/DL
BUN SERPL-MCNC: 50 MG/DL
CALCIUM SERPL-MCNC: 10.4 MG/DL
CHLORIDE SERPL-SCNC: 105 MMOL/L
CHOLEST SERPL-MCNC: 192 MG/DL
CHOLEST/HDLC SERPL: 3.4 RATIO
CO2 SERPL-SCNC: 24 MMOL/L
CREAT SERPL-MCNC: 1.5 MG/DL
EOSINOPHIL # BLD AUTO: 0.1 K/UL
EOSINOPHIL NFR BLD AUTO: 0.9 %
ESTIMATED AVERAGE GLUCOSE: 114 MG/DL
GLUCOSE SERPL-MCNC: 98 MG/DL
HBA1C MFR BLD HPLC: 5.6 %
HCT VFR BLD CALC: 39 %
HDLC SERPL-MCNC: 56 MG/DL
HGB BLD-MCNC: 12 G/DL
IMM GRANULOCYTES NFR BLD AUTO: 0.9 %
LDLC SERPL CALC-MCNC: 70 MG/DL
LYMPHOCYTES # BLD AUTO: 2.71 K/UL
LYMPHOCYTES NFR BLD AUTO: 23.4 %
MAN DIFF?: NORMAL
MCHC RBC-ENTMCNC: 29.3 PG
MCHC RBC-ENTMCNC: 30.8 GM/DL
MCV RBC AUTO: 95.1 FL
MONOCYTES # BLD AUTO: 0.89 K/UL
MONOCYTES NFR BLD AUTO: 7.7 %
NEUTROPHILS # BLD AUTO: 7.76 K/UL
NEUTROPHILS NFR BLD AUTO: 66.8 %
PLATELET # BLD AUTO: 386 K/UL
POTASSIUM SERPL-SCNC: 4.8 MMOL/L
PROT SERPL-MCNC: 6.2 G/DL
RBC # BLD: 4.1 M/UL
RBC # FLD: 13.7 %
SODIUM SERPL-SCNC: 144 MMOL/L
T4 FREE SERPL-MCNC: 1 NG/DL
TRIGL SERPL-MCNC: 328 MG/DL
TSH SERPL-ACNC: 0.69 UIU/ML
WBC # FLD AUTO: 11.6 K/UL

## 2019-10-04 LAB — 25(OH)D3 SERPL-MCNC: 35.4 NG/ML

## 2019-10-08 ENCOUNTER — APPOINTMENT (OUTPATIENT)
Dept: INTERNAL MEDICINE | Facility: CLINIC | Age: 84
End: 2019-10-08

## 2019-10-22 ENCOUNTER — MEDICATION RENEWAL (OUTPATIENT)
Age: 84
End: 2019-10-22

## 2019-10-22 DIAGNOSIS — L29.9 PRURITUS, UNSPECIFIED: ICD-10-CM

## 2019-10-22 RX ORDER — FUROSEMIDE 40 MG/1
40 TABLET ORAL DAILY
Qty: 90 | Refills: 3 | Status: DISCONTINUED | COMMUNITY
Start: 2019-07-16 | End: 2019-10-22

## 2019-12-04 ENCOUNTER — APPOINTMENT (OUTPATIENT)
Dept: COLORECTAL SURGERY | Facility: CLINIC | Age: 84
End: 2019-12-04
Payer: MEDICARE

## 2019-12-04 VITALS
TEMPERATURE: 98.1 F | OXYGEN SATURATION: 98 % | HEART RATE: 100 BPM | SYSTOLIC BLOOD PRESSURE: 139 MMHG | DIASTOLIC BLOOD PRESSURE: 66 MMHG

## 2019-12-04 PROCEDURE — 46221 LIGATION OF HEMORRHOID(S): CPT

## 2019-12-04 PROCEDURE — 99213 OFFICE O/P EST LOW 20 MIN: CPT | Mod: 25

## 2019-12-04 NOTE — ASSESSMENT
[FreeTextEntry1] : Bleeding IH\par -After discussion of options, risks and benefits, the pt elected to attempt further RBL.\par -RBL was performed on the RP IH w/o complication in standard fashion\par -Restart fiber supplement daily\par -f/u in 4 weeks if persistent bleeding

## 2019-12-07 ENCOUNTER — OTHER (OUTPATIENT)
Age: 84
End: 2019-12-07

## 2020-01-29 ENCOUNTER — APPOINTMENT (OUTPATIENT)
Dept: COLORECTAL SURGERY | Facility: CLINIC | Age: 85
End: 2020-01-29
Payer: MEDICARE

## 2020-01-29 VITALS
DIASTOLIC BLOOD PRESSURE: 73 MMHG | OXYGEN SATURATION: 98 % | SYSTOLIC BLOOD PRESSURE: 142 MMHG | HEIGHT: 59 IN | WEIGHT: 131 LBS | TEMPERATURE: 98.1 F | HEART RATE: 98 BPM | BODY MASS INDEX: 26.41 KG/M2

## 2020-01-29 PROCEDURE — 46221 LIGATION OF HEMORRHOID(S): CPT

## 2020-01-29 PROCEDURE — 99212 OFFICE O/P EST SF 10 MIN: CPT | Mod: 25

## 2020-01-29 NOTE — ASSESSMENT
[FreeTextEntry1] : Bleeding internal Hemorrhoids\par -RBL was performed on the RP IH without complication in standard fashion\par -I urged pt to restart metamucil\par -I also had a long discussion with the pt that some bleeding/spotting is to be expected as total treatment/excision of her hemorrhoids is not advised.  Pt is to f/u if persistent bleeding is noted weekly\par -all questions answered

## 2020-01-29 NOTE — HISTORY OF PRESENT ILLNESS
[FreeTextEntry1] : 89 yo female w/ hx of bleeding hemorrhoids.  Pt had been progressing well and not increased bleeding over the last 2 weeks.  Spotting on underwear.  Denies pain.  Pt take metamucil, but had not been recently.  Occasional prolapse of tissue.  no fevers or chills

## 2020-01-31 ENCOUNTER — RX RENEWAL (OUTPATIENT)
Age: 85
End: 2020-01-31

## 2020-02-28 ENCOUNTER — APPOINTMENT (OUTPATIENT)
Dept: VASCULAR SURGERY | Facility: CLINIC | Age: 85
End: 2020-02-28
Payer: MEDICARE

## 2020-02-28 VITALS
TEMPERATURE: 98 F | BODY MASS INDEX: 26.41 KG/M2 | HEART RATE: 94 BPM | SYSTOLIC BLOOD PRESSURE: 136 MMHG | WEIGHT: 131 LBS | HEIGHT: 59 IN | DIASTOLIC BLOOD PRESSURE: 67 MMHG

## 2020-02-28 PROCEDURE — 99203 OFFICE O/P NEW LOW 30 MIN: CPT

## 2020-02-28 PROCEDURE — 93923 UPR/LXTR ART STDY 3+ LVLS: CPT

## 2020-03-10 ENCOUNTER — APPOINTMENT (OUTPATIENT)
Dept: INTERNAL MEDICINE | Facility: CLINIC | Age: 85
End: 2020-03-10
Payer: MEDICARE

## 2020-03-10 VITALS
BODY MASS INDEX: 27.21 KG/M2 | SYSTOLIC BLOOD PRESSURE: 134 MMHG | HEART RATE: 76 BPM | WEIGHT: 135 LBS | DIASTOLIC BLOOD PRESSURE: 78 MMHG | HEIGHT: 59 IN

## 2020-03-10 DIAGNOSIS — M48.061 SPINAL STENOSIS, LUMBAR REGION WITHOUT NEUROGENIC CLAUDICATION: ICD-10-CM

## 2020-03-10 PROCEDURE — 99213 OFFICE O/P EST LOW 20 MIN: CPT

## 2020-03-10 NOTE — HISTORY OF PRESENT ILLNESS
[FreeTextEntry1] : leg pain [de-identified] : leg pain with walking\par saw vascular\par good circulation\par some numbness legs\par on prednisone 7.5 for pmr

## 2020-03-10 NOTE — PHYSICAL EXAM
[No Acute Distress] : no acute distress [No JVD] : no jugular venous distention [No Lymphadenopathy] : no lymphadenopathy [No Respiratory Distress] : no respiratory distress  [No Accessory Muscle Use] : no accessory muscle use [Normal Rate] : normal rate  [Regular Rhythm] : with a regular rhythm [No Edema] : there was no peripheral edema [Normal] : soft, non-tender, non-distended, no masses palpated, no HSM and normal bowel sounds [de-identified] : excellent pedal pulses

## 2020-03-10 NOTE — REVIEW OF SYSTEMS
[Fever] : no fever [Night Sweats] : no night sweats [Chest Pain] : no chest pain [Shortness Of Breath] : no shortness of breath [Wheezing] : no wheezing [Joint Pain] : joint pain [Muscle Pain] : muscle pain

## 2020-03-10 NOTE — PLAN
[FreeTextEntry1] : probable spinal stenosis\par slow start of gabapentin\par 100 at bedtime for 1 week\par then twice daily \par call  after 1 week

## 2020-04-27 NOTE — HISTORY OF PRESENT ILLNESS
[FreeTextEntry1] : 89 yo female w/ hx of multiple RBL for bleeding IH.  Last procedure performed by my partner approximately 6 months ago.  Currently, denies rectal pain.  Bleeding over last 2 weeks w/ each BM.  No aggravating factors.   Normal appetite No

## 2020-05-26 ENCOUNTER — APPOINTMENT (OUTPATIENT)
Dept: INTERNAL MEDICINE | Facility: CLINIC | Age: 85
End: 2020-05-26
Payer: MEDICARE

## 2020-05-26 DIAGNOSIS — R19.7 DIARRHEA, UNSPECIFIED: ICD-10-CM

## 2020-05-26 PROCEDURE — 99442: CPT | Mod: 95

## 2020-05-28 NOTE — HISTORY OF PRESENT ILLNESS
[FreeTextEntry1] : Diarrhea [de-identified] : Diarrhea 1 week\par 2 loose bm daily\par has reduce oral intake\par no fever\par a little weak

## 2020-05-28 NOTE — PLAN
[FreeTextEntry1] : Diarrhea discussed\par option of treatment given\par to try imodium 1 tab daily as needed\par consider pepto bismol in future\par warned about black stool

## 2020-06-05 ENCOUNTER — APPOINTMENT (OUTPATIENT)
Dept: VASCULAR SURGERY | Facility: CLINIC | Age: 85
End: 2020-06-05

## 2020-06-17 ENCOUNTER — APPOINTMENT (OUTPATIENT)
Dept: INTERNAL MEDICINE | Facility: CLINIC | Age: 85
End: 2020-06-17
Payer: MEDICARE

## 2020-06-17 VITALS
TEMPERATURE: 98 F | DIASTOLIC BLOOD PRESSURE: 80 MMHG | WEIGHT: 125 LBS | HEIGHT: 55 IN | SYSTOLIC BLOOD PRESSURE: 130 MMHG | HEART RATE: 85 BPM | BODY MASS INDEX: 28.93 KG/M2

## 2020-06-17 VITALS
HEART RATE: 86 BPM | RESPIRATION RATE: 15 BRPM | DIASTOLIC BLOOD PRESSURE: 70 MMHG | OXYGEN SATURATION: 96 % | SYSTOLIC BLOOD PRESSURE: 167 MMHG

## 2020-06-17 DIAGNOSIS — R23.3 SPONTANEOUS ECCHYMOSES: ICD-10-CM

## 2020-06-17 DIAGNOSIS — S81.812S LACERATION W/OUT FOREIGN BODY, LEFT LOWER LEG, SEQUELA: ICD-10-CM

## 2020-06-17 PROCEDURE — 99214 OFFICE O/P EST MOD 30 MIN: CPT

## 2020-06-17 NOTE — PHYSICAL EXAM
[No Acute Distress] : no acute distress [Normal Outer Ear/Nose] : the outer ears and nose were normal in appearance [No JVD] : no jugular venous distention [No Respiratory Distress] : no respiratory distress  [Normal Oropharynx] : the oropharynx was normal [Regular Rhythm] : with a regular rhythm [No Accessory Muscle Use] : no accessory muscle use [Soft] : abdomen soft [No HSM] : no HSM [de-identified] : 3 x 3 cm v shaped lacerstion with 3 sutres remove, does not look infected but still has healong to go  steri strip in place

## 2020-06-17 NOTE — REVIEW OF SYSTEMS
[Joint Pain] : joint pain [Muscle Weakness] : no muscle weakness [Muscle Pain] : muscle pain [Negative] : Respiratory [de-identified] : ecchymosis and laceration left lower leg

## 2020-06-17 NOTE — HISTORY OF PRESENT ILLNESS
Placed discharge outreach phone call to pt via  at [facility] s/p hospital discharge 12/25/18.  Left message with pt's son providing my contact information and instructions for pt to call with any questions or concerns.    [de-identified] : 89 year brougth in by aide in wheelchair\par wheel chair bound can not ambulate\par fell ou of bed\par laceration, v shaped left leg with 3 sutres \par finished up 5 days of keflex\par numerous ecchymosis arms\par ecchymosis on back resolving\par on chronic steroid for pmr\par Has 24 hours aides\par eating poor [FreeTextEntry1] : suture removal

## 2020-06-17 NOTE — ASSESSMENT
[FreeTextEntry1] : 3 suture removed\par dressing applied with bacitracin to be changed daily\par continue keflex 500 tid\par bedside commode\par doper for incontinence\par marked deterioration since last physical visit\par prognosis not good

## 2020-06-21 ENCOUNTER — INPATIENT (INPATIENT)
Facility: HOSPITAL | Age: 85
LOS: 4 days | Discharge: NURSING, REHAB OR LONG-TERM CARE | End: 2020-06-26
Attending: HOSPITALIST | Admitting: HOSPITALIST
Payer: MEDICARE

## 2020-06-21 VITALS
RESPIRATION RATE: 18 BRPM | DIASTOLIC BLOOD PRESSURE: 74 MMHG | OXYGEN SATURATION: 99 % | TEMPERATURE: 98 F | SYSTOLIC BLOOD PRESSURE: 175 MMHG | HEART RATE: 88 BPM

## 2020-06-21 DIAGNOSIS — Z02.9 ENCOUNTER FOR ADMINISTRATIVE EXAMINATIONS, UNSPECIFIED: ICD-10-CM

## 2020-06-21 DIAGNOSIS — Z29.9 ENCOUNTER FOR PROPHYLACTIC MEASURES, UNSPECIFIED: ICD-10-CM

## 2020-06-21 DIAGNOSIS — M35.3 POLYMYALGIA RHEUMATICA: ICD-10-CM

## 2020-06-21 DIAGNOSIS — D72.829 ELEVATED WHITE BLOOD CELL COUNT, UNSPECIFIED: ICD-10-CM

## 2020-06-21 DIAGNOSIS — E87.1 HYPO-OSMOLALITY AND HYPONATREMIA: ICD-10-CM

## 2020-06-21 DIAGNOSIS — I10 ESSENTIAL (PRIMARY) HYPERTENSION: ICD-10-CM

## 2020-06-21 LAB
ANION GAP SERPL CALC-SCNC: 14 MMO/L — SIGNIFICANT CHANGE UP (ref 7–14)
APPEARANCE UR: CLEAR — SIGNIFICANT CHANGE UP
BACTERIA # UR AUTO: NEGATIVE — SIGNIFICANT CHANGE UP
BASOPHILS # BLD AUTO: 0.02 K/UL — SIGNIFICANT CHANGE UP (ref 0–0.2)
BASOPHILS NFR BLD AUTO: 0.1 % — SIGNIFICANT CHANGE UP (ref 0–2)
BILIRUB UR-MCNC: NEGATIVE — SIGNIFICANT CHANGE UP
BLOOD UR QL VISUAL: NEGATIVE — SIGNIFICANT CHANGE UP
BUN SERPL-MCNC: 23 MG/DL — SIGNIFICANT CHANGE UP (ref 7–23)
CALCIUM SERPL-MCNC: 9.3 MG/DL — SIGNIFICANT CHANGE UP (ref 8.4–10.5)
CHLORIDE SERPL-SCNC: 76 MMOL/L — LOW (ref 98–107)
CHLORIDE UR-SCNC: 42 MMOL/L — SIGNIFICANT CHANGE UP
CO2 SERPL-SCNC: 21 MMOL/L — LOW (ref 22–31)
COLOR SPEC: SIGNIFICANT CHANGE UP
CREAT ?TM UR-MCNC: 48.6 MG/DL — SIGNIFICANT CHANGE UP
CREAT SERPL-MCNC: 1.07 MG/DL — SIGNIFICANT CHANGE UP (ref 0.5–1.3)
EOSINOPHIL # BLD AUTO: 0.01 K/UL — SIGNIFICANT CHANGE UP (ref 0–0.5)
EOSINOPHIL NFR BLD AUTO: 0.1 % — SIGNIFICANT CHANGE UP (ref 0–6)
GLUCOSE SERPL-MCNC: 115 MG/DL — HIGH (ref 70–99)
GLUCOSE UR-MCNC: NEGATIVE — SIGNIFICANT CHANGE UP
HCT VFR BLD CALC: 29.1 % — LOW (ref 34.5–45)
HGB BLD-MCNC: 10.4 G/DL — LOW (ref 11.5–15.5)
IMM GRANULOCYTES NFR BLD AUTO: 1.1 % — SIGNIFICANT CHANGE UP (ref 0–1.5)
KETONES UR-MCNC: NEGATIVE — SIGNIFICANT CHANGE UP
LEUKOCYTE ESTERASE UR-ACNC: NEGATIVE — SIGNIFICANT CHANGE UP
LYMPHOCYTES # BLD AUTO: 1.16 K/UL — SIGNIFICANT CHANGE UP (ref 1–3.3)
LYMPHOCYTES # BLD AUTO: 7 % — LOW (ref 13–44)
MAGNESIUM SERPL-MCNC: 1.3 MG/DL — LOW (ref 1.6–2.6)
MCHC RBC-ENTMCNC: 29.1 PG — SIGNIFICANT CHANGE UP (ref 27–34)
MCHC RBC-ENTMCNC: 35.7 % — SIGNIFICANT CHANGE UP (ref 32–36)
MCV RBC AUTO: 81.5 FL — SIGNIFICANT CHANGE UP (ref 80–100)
MONOCYTES # BLD AUTO: 1.25 K/UL — HIGH (ref 0–0.9)
MONOCYTES NFR BLD AUTO: 7.5 % — SIGNIFICANT CHANGE UP (ref 2–14)
NEUTROPHILS # BLD AUTO: 13.94 K/UL — HIGH (ref 1.8–7.4)
NEUTROPHILS NFR BLD AUTO: 84.2 % — HIGH (ref 43–77)
NITRITE UR-MCNC: NEGATIVE — SIGNIFICANT CHANGE UP
NRBC # FLD: 0 K/UL — SIGNIFICANT CHANGE UP (ref 0–0)
OSMOLALITY SERPL: 235 MOSMO/KG — LOW (ref 275–295)
OSMOLALITY UR: 327 MOSMO/KG — SIGNIFICANT CHANGE UP (ref 50–1200)
PH UR: 7.5 — SIGNIFICANT CHANGE UP (ref 5–8)
PHOSPHATE SERPL-MCNC: 2.1 MG/DL — LOW (ref 2.5–4.5)
PLATELET # BLD AUTO: 423 K/UL — HIGH (ref 150–400)
PMV BLD: 8.9 FL — SIGNIFICANT CHANGE UP (ref 7–13)
POTASSIUM SERPL-MCNC: 3.3 MMOL/L — LOW (ref 3.5–5.3)
POTASSIUM SERPL-SCNC: 3.3 MMOL/L — LOW (ref 3.5–5.3)
POTASSIUM UR-SCNC: 48 MMOL/L — SIGNIFICANT CHANGE UP
PROT UR-MCNC: 15.7 MG/DL — SIGNIFICANT CHANGE UP
PROT UR-MCNC: 20 — SIGNIFICANT CHANGE UP
RBC # BLD: 3.57 M/UL — LOW (ref 3.8–5.2)
RBC # FLD: 12 % — SIGNIFICANT CHANGE UP (ref 10.3–14.5)
RBC CASTS # UR COMP ASSIST: SIGNIFICANT CHANGE UP (ref 0–?)
SODIUM SERPL-SCNC: 111 MMOL/L — CRITICAL LOW (ref 135–145)
SODIUM UR-SCNC: 39 MMOL/L — SIGNIFICANT CHANGE UP
SODIUM UR-SCNC: 39 MMOL/L — SIGNIFICANT CHANGE UP
SP GR SPEC: 1.01 — SIGNIFICANT CHANGE UP (ref 1–1.04)
SQUAMOUS # UR AUTO: SIGNIFICANT CHANGE UP
UROBILINOGEN FLD QL: NORMAL — SIGNIFICANT CHANGE UP
UUN UR-MCNC: 385 MG/DL — SIGNIFICANT CHANGE UP
WBC # BLD: 16.57 K/UL — HIGH (ref 3.8–10.5)
WBC # FLD AUTO: 16.57 K/UL — HIGH (ref 3.8–10.5)
WBC UR QL: SIGNIFICANT CHANGE UP (ref 0–?)

## 2020-06-21 PROCEDURE — 99222 1ST HOSP IP/OBS MODERATE 55: CPT

## 2020-06-21 PROCEDURE — 99223 1ST HOSP IP/OBS HIGH 75: CPT | Mod: AI,GC

## 2020-06-21 PROCEDURE — 71045 X-RAY EXAM CHEST 1 VIEW: CPT | Mod: 26

## 2020-06-21 PROCEDURE — 74018 RADEX ABDOMEN 1 VIEW: CPT | Mod: 26

## 2020-06-21 RX ORDER — AMLODIPINE BESYLATE 2.5 MG/1
2.5 TABLET ORAL DAILY
Refills: 0 | Status: DISCONTINUED | OUTPATIENT
Start: 2020-06-22 | End: 2020-06-24

## 2020-06-21 RX ORDER — HEPARIN SODIUM 5000 [USP'U]/ML
5000 INJECTION INTRAVENOUS; SUBCUTANEOUS EVERY 12 HOURS
Refills: 0 | Status: DISCONTINUED | OUTPATIENT
Start: 2020-06-21 | End: 2020-06-23

## 2020-06-21 RX ORDER — MAGNESIUM SULFATE 500 MG/ML
2 VIAL (ML) INJECTION ONCE
Refills: 0 | Status: COMPLETED | OUTPATIENT
Start: 2020-06-21 | End: 2020-06-21

## 2020-06-21 RX ORDER — CEFTRIAXONE 500 MG/1
1000 INJECTION, POWDER, FOR SOLUTION INTRAMUSCULAR; INTRAVENOUS ONCE
Refills: 0 | Status: COMPLETED | OUTPATIENT
Start: 2020-06-21 | End: 2020-06-21

## 2020-06-21 RX ORDER — POTASSIUM PHOSPHATE, MONOBASIC POTASSIUM PHOSPHATE, DIBASIC 236; 224 MG/ML; MG/ML
30 INJECTION, SOLUTION INTRAVENOUS ONCE
Refills: 0 | Status: COMPLETED | OUTPATIENT
Start: 2020-06-21 | End: 2020-06-21

## 2020-06-21 RX ORDER — AMLODIPINE BESYLATE 2.5 MG/1
2.5 TABLET ORAL ONCE
Refills: 0 | Status: COMPLETED | OUTPATIENT
Start: 2020-06-21 | End: 2020-06-21

## 2020-06-21 RX ORDER — POTASSIUM CHLORIDE 20 MEQ
40 PACKET (EA) ORAL ONCE
Refills: 0 | Status: COMPLETED | OUTPATIENT
Start: 2020-06-21 | End: 2020-06-21

## 2020-06-21 RX ORDER — HYDRALAZINE HCL 50 MG
5 TABLET ORAL ONCE
Refills: 0 | Status: COMPLETED | OUTPATIENT
Start: 2020-06-21 | End: 2020-06-21

## 2020-06-21 RX ORDER — ACETAMINOPHEN 500 MG
650 TABLET ORAL ONCE
Refills: 0 | Status: COMPLETED | OUTPATIENT
Start: 2020-06-21 | End: 2020-06-21

## 2020-06-21 RX ORDER — SODIUM CHLORIDE 9 MG/ML
1000 INJECTION INTRAMUSCULAR; INTRAVENOUS; SUBCUTANEOUS
Refills: 0 | Status: DISCONTINUED | OUTPATIENT
Start: 2020-06-21 | End: 2020-06-21

## 2020-06-21 RX ORDER — SODIUM CHLORIDE 5 G/100ML
500 INJECTION, SOLUTION INTRAVENOUS
Refills: 0 | Status: DISCONTINUED | OUTPATIENT
Start: 2020-06-21 | End: 2020-06-21

## 2020-06-21 RX ADMIN — CEFTRIAXONE 100 MILLIGRAM(S): 500 INJECTION, POWDER, FOR SOLUTION INTRAMUSCULAR; INTRAVENOUS at 19:53

## 2020-06-21 RX ADMIN — SODIUM CHLORIDE 100 MILLILITER(S): 9 INJECTION INTRAMUSCULAR; INTRAVENOUS; SUBCUTANEOUS at 15:05

## 2020-06-21 RX ADMIN — Medication 5 MILLIGRAM(S): at 17:49

## 2020-06-21 RX ADMIN — Medication 50 GRAM(S): at 21:04

## 2020-06-21 RX ADMIN — Medication 5 MILLIGRAM(S): at 21:00

## 2020-06-21 RX ADMIN — Medication 40 MILLIEQUIVALENT(S): at 22:28

## 2020-06-21 RX ADMIN — Medication 650 MILLIGRAM(S): at 14:19

## 2020-06-21 RX ADMIN — POTASSIUM PHOSPHATE, MONOBASIC POTASSIUM PHOSPHATE, DIBASIC 100 MILLIMOLE(S): 236; 224 INJECTION, SOLUTION INTRAVENOUS at 21:29

## 2020-06-21 RX ADMIN — AMLODIPINE BESYLATE 2.5 MILLIGRAM(S): 2.5 TABLET ORAL at 15:52

## 2020-06-21 RX ADMIN — HEPARIN SODIUM 5000 UNIT(S): 5000 INJECTION INTRAVENOUS; SUBCUTANEOUS at 19:54

## 2020-06-21 NOTE — ED ADULT NURSE NOTE - CHIEF COMPLAINT QUOTE
arrives ems with c/o continued weakness,decreased appetite..seen at other hospital 6/8 for fall  with "failure to thrive"   pt alert,oriented x2. fs 190 by ems. denies  pain  . a states pt constipated . no bm x 4 days. lle wound,sutures removed on wed. area appears sl red  .. ramón sheffield [phone].   son [phone]

## 2020-06-21 NOTE — H&P ADULT - NSHPLABSRESULTS_GEN_ALL_CORE
The Labs were reviewed by me   The Radiology was reviewed by me    EKG tracing reviewed by me        108<LL>  |  74<L>  |  25<H>  ----------------------------<  129<H>  4.7   |  19<L>  |  1.07    Ca    9.8      2020 13:00    TPro  6.0  /  Alb  3.5  /  TBili  0.5  /  DBili  x   /  AST  35<H>  /  ALT  18  /  AlkPhos  58                          Urinalysis Basic - ( 2020 16:15 )    Color: LIGHT YELLOW / Appearance: CLEAR / S.013 / pH: 7.5  Gluc: NEGATIVE / Ketone: NEGATIVE  / Bili: NEGATIVE / Urobili: NORMAL   Blood: NEGATIVE / Protein: 20 / Nitrite: NEGATIVE   Leuk Esterase: NEGATIVE / RBC: 3-5 / WBC 0-2   Sq Epi: FEW / Non Sq Epi: x / Bacteria: NEGATIVE                              10.4   16.57 )-----------( 423      ( 2020 14:15 )             29.1     CAPILLARY BLOOD GLUCOSE The Labs were reviewed by me   The Radiology was reviewed by me    EKG tracing reviewed by me: NSR        108<LL>  |  74<L>  |  25<H>  ----------------------------<  129<H>  4.7   |  19<L>  |  1.07    Ca    9.8      2020 13:00    TPro  6.0  /  Alb  3.5  /  TBili  0.5  /  DBili  x   /  AST  35<H>  /  ALT  18  /  AlkPhos  58                          Urinalysis Basic - ( 2020 16:15 )    Color: LIGHT YELLOW / Appearance: CLEAR / S.013 / pH: 7.5  Gluc: NEGATIVE / Ketone: NEGATIVE  / Bili: NEGATIVE / Urobili: NORMAL   Blood: NEGATIVE / Protein: 20 / Nitrite: NEGATIVE   Leuk Esterase: NEGATIVE / RBC: 3-5 / WBC 0-2   Sq Epi: FEW / Non Sq Epi: x / Bacteria: NEGATIVE                              10.4   16.57 )-----------( 423      ( 2020 14:15 )             29.1     CAPILLARY BLOOD GLUCOSE

## 2020-06-21 NOTE — ED ADULT NURSE NOTE - IS THE PATIENT ABLE TO BE SCREENED?
"Patient states has hade a cough for a little over a week. Was better but now its worse.    Chief Complaint   Patient presents with     Cough     Possible Brobchitis        Initial /84 (BP Location: Right arm, Patient Position: Chair, Cuff Size: Adult Regular)  Temp 97.4  F (36.3  C) (Tympanic)  Wt 163 lb 9.6 oz (74.2 kg)  LMP 07/28/2018  Breastfeeding? No  BMI 26.81 kg/m2 Estimated body mass index is 26.81 kg/(m^2) as calculated from the following:    Height as of 7/9/18: 5' 5.5\" (1.664 m).    Weight as of this encounter: 163 lb 9.6 oz (74.2 kg).  Medication Reconciliation: complete      Marian Schwartz LPN on 8/22/2018 at 1:46 PM    "
No

## 2020-06-21 NOTE — ED PROVIDER NOTE - CLINICAL SUMMARY MEDICAL DECISION MAKING FREE TEXT BOX
Unable to get Hx, 2 unsuccessful attempts to reach family 89 year old woman with PMH of HTN pw weakness most likely from Hyponatremia. Tried to reach family, unable to get response at [phone], which was obtained from her NH. 89 year old woman with PMH of HTN pw weakness most likely from Hyponatremia. Tried to reach family, unable to get response at [phone], which was obtained from her aide.

## 2020-06-21 NOTE — ED ADULT NURSE NOTE - OBJECTIVE STATEMENT
Pt presenting to room 22 AxOx1- disoriented to year and place with c.o constipation x4 days, vomiting x1 day. Pt denies any significant past medical history. On arrival to ED pt's breathing is even and unlabored. Palor/diaphoresis not noted. Abdomen rounded and distended. No active vomiting noted on arrival. Skin dry and intact with blanchable redness on sacral area. Pt denies CP, SOB, urinary symptoms, fever, cough, abdominal pain. IV established with 20g in LAC, labs drawn and sent. MD at bedside, will continue to monitor. Pt presenting to room 22 AxOx1- disoriented to year and place with c.o constipation x4 days, vomiting x1 day. Pt denies any significant past medical history. On arrival to ED pt's breathing is even and unlabored. Palor/diaphoresis not noted. Abdomen rounded and distended. No active vomiting noted on arrival. Skin dry and intact with blanchable redness on sacral area. Pt noted to have open laceration to left shin- covered with gauze on arrival, nondraining, not bleeding. Pt denies CP, SOB, urinary symptoms, fever, cough, abdominal pain. IV established with 20g in LAC, labs drawn and sent. MD at bedside, will continue to monitor.

## 2020-06-21 NOTE — CONSULT NOTE ADULT - SUBJECTIVE AND OBJECTIVE BOX
[facility] DIVISION OF KIDNEY DISEASES AND HYPERTENSION -- [phone]  -- INITIAL CONSULT NOTE  --------------------------------------------------------------------------------  HPI: 89-year-old female, with HTN presented to [facility] ER due to increasing weakness, admitted for hyponatremia. History obtained from pt's son and ED team. As per son, patient was in her usual health about 1 month ago. At baseline, patient is alert and oriented x 3, occasionally forgetful. Pt. had fall about 1 month ago when she got out of bed and has been deteriorating since then. Son provided pt. with aid and says that his mother was skipping meals and had decreased appetite in the last month. Denies any previous history of hyponatremia. Of note, pt. taking HCTZ 50 mg for BP control. Pt. also receiving antibiotics for suspected cellulitis at suture site from fall. Nephrology team consulted for hyponatremia. Upon review of labs on [facility], sNa was 144 on 10/2/19. On arrival, sNa was 108.    In the ED, pt. confused, unaware why she is in the hospital but is able to follow commands. According to ER resident, pt. received about 15 cc of 3% HTS prior to discontinuation. Pt. making urine.    PAST HISTORY  --------------------------------------------------------------------------------  PAST MEDICAL & SURGICAL HISTORY:  Hypertension    FAMILY HISTORY:    PAST SOCIAL HISTORY: lives at home alone, has home aid    ALLERGIES & MEDICATIONS  --------------------------------------------------------------------------------  Allergies    No Known Allergies    Intolerances    Standing Inpatient Medications  hydrALAZINE Injectable 5 milliGRAM(s) IV Push once  sodium chloride 0.9%. 1000 milliLiter(s) IV Continuous <Continuous>      REVIEW OF SYSTEMS  --------------------------------------------------------------------------------  GI: + decrease PO intake    VITALS/PHYSICAL EXAM  --------------------------------------------------------------------------------  T(C): 36.9 (06-21-20 @ 12:24), Max: 36.9 (06-21-20 @ 12:24)  HR: 85 (06-21-20 @ 13:47) (85 - 88)  BP: 192/80 (06-21-20 @ 13:47) (175/74 - 192/80)  RR: 16 (06-21-20 @ 13:47) (16 - 18)  SpO2: 100% (06-21-20 @ 13:47) (99% - 100%)  Wt(kg): --    Physical Exam:  	Gen: NAD  	HEENT: MMM  	Pulm: CTA B/L  	CV: S1S2  	Abd: Soft, +BS   	Ext: No LE edema B/L  	Neuro: Awake, confused  	Skin: Warm and dry  	  LABS/STUDIES  --------------------------------------------------------------------------------              10.4   16.57 >-----------<  423      [06-21-20 @ 14:15]              29.1     108  |  74  |  25  ----------------------------<  129      [06-21-20 @ 13:00]  4.7   |  19  |  1.07        Ca     9.8     [06-21-20 @ 13:00]    TPro  6.0  /  Alb  3.5  /  TBili  0.5  /  DBili  x   /  AST  35  /  ALT  18  /  AlkPhos  58  [06-21-20 @ 13:00]    Creatinine Trend:  SCr 1.07 [06-21 @ 13:00]    Urinalysis - [06-21-20 @ 16:15]      Color LIGHT YELLOW / Appearance CLEAR / SG 1.013 / pH 7.5      Gluc NEGATIVE / Ketone NEGATIVE  / Bili NEGATIVE / Urobili NORMAL       Blood NEGATIVE / Protein 20 / Leuk Est NEGATIVE / Nitrite NEGATIVE      RBC 3-5 / WBC 0-2 / Hyaline  / Gran  / Sq Epi FEW / Non Sq Epi  / Bacteria NEGATIVE

## 2020-06-21 NOTE — CONSULT NOTE ADULT - PROBLEM SELECTOR RECOMMENDATION 9
Pt. with hyponatremia in the setting of thiazide diuretic use and poor PO intake. No previous history of hyponatremia. sNa on arrival was 108. Pt. received 15cc of 3% HTS and is currently receiving NS @ 100 ml/hour. Discontinue IVF. Check repeat BMP STAT. Place joyner catheter to monitor urine output. Check serum osmolality, urine electrolytes and uOsms. If Uosms > 300, start fluid restriction < 1L/day. If uOsms < 300, liberalize diet and allow pt. to drink to thirst. Encourage PO (solute) intake. Check BMP q 6 hours. Do not correct > 6-8 mEq in 24 hours. Goal sNa tomorrow is 114 in the evening.    If any questions, please feel free to contact me  Justin Nunez   Nephrology Fellow  [phone]  (After 5 pm or on weekends please page the on-call fellow) Pt. with hyponatremia in the setting of thiazide diuretic use and poor PO intake. No previous history of hyponatremia. sNa on arrival was 108. Pt. received 15cc of 3% HTS and is currently receiving NS @ 100 ml/hour. Hold thiazide diuretic. Discontinue IVF. Check repeat BMP STAT. Place joyner catheter to monitor urine output. Check serum osmolality, urine electrolytes and uOsms. If Uosms > 300, start fluid restriction < 1L/day. If uOsms < 300, liberalize diet and allow pt. to drink to thirst. Encourage PO (solute) intake. Check BMP q 6 hours. Do not correct > 6-8 mEq in 24 hours. Goal sNa tomorrow is 114 in the evening.    If any questions, please feel free to contact me  Justin Nunez   Nephrology Fellow  [phone]  (After 5 pm or on weekends please page the on-call fellow)

## 2020-06-21 NOTE — ED PROVIDER NOTE - PROGRESS NOTE DETAILS
You ([facility]):  MICU consulted and recommended medicine admission.  Admitted to Medicine under Dr. Tai.

## 2020-06-21 NOTE — H&P ADULT - PROBLEM SELECTOR PLAN 3
P/w leukocytosis of 16.5   -likely elevated due to dehydration vs. reactive  -low suspicion for infection as remains afebrile, but infection may not present as typical in elderly  -f/u UCX, give ceftriaxone 1g x1  -f/u CXR

## 2020-06-21 NOTE — CONSULT NOTE ADULT - ATTENDING COMMENTS
I personally interviewed and reviewed the plan of therapy with the resident at bedside.  Pratima provided me with limited information as she appears mildly confused, though knows her name, location, and that she has not had a BM in "days". She was, during my interview @ 1600, not obtunded, she appears,  clinically "dry" with tenting skin and dry mucous membranes. At this time she is on fluids, and would recommend repeat BMP q 12 hours or so. At this time she does not require MICU level of intervention, therapy, or treatment. Would also suggest bowel regiment.    The recommendations as noted were d/w ED and the bedside RN. The primary medical team (including but not limited to the physician,  RN, and/or RT if necessary)  noted they were aware of the recommendations, agreed with their implementation, and would follow up on their initiation and completion. Furthermore, all ordered treatment, therapies, and interventions will be followed up by ED or primary team or their covering provider throughout the day/evening, as per ED or primary team sign-out and discretion. Thank you for this opportunity and please re-consult as necessary.
Hyponatremia    Patient seen and examined today, would continue to hold diuretic and encourage PO intake

## 2020-06-21 NOTE — H&P ADULT - PROBLEM SELECTOR PLAN 6
Transitions of Care Status:  1.  Name of PCP:  2.  PCP Contacted on Admission: [ ] Y    [ ] N    3.  PCP contacted at Discharge: [ ] Y    [ ] N    [ ] N/A  4.  Post-Discharge Appointment Date and Location:  5.  Summary of Handoff given to PCP: Transitions of Care Status:  1.  Name of PCP:  2.  PCP Contacted on Admission: [X  ] Y    [ ] N  Dr Albaro Ragsdale [phone]  3.  PCP contacted at Discharge: [ ] Y    [ ] N    [ ] N/A  4.  Post-Discharge Appointment Date and Location:  5.  Summary of Handoff given to PCP:

## 2020-06-21 NOTE — ED PROVIDER NOTE - ATTENDING CONTRIBUTION TO CARE
Attending note:   After face to face evaluation of this patient, I concur with above noted hx, pe, and care plan for this patient.  Aguilar: 89 yof sent from facility for confusion? History is limited. Pt is awake, alert follows commands, aware but oriented x 2. NC/AT, multiple areas of ecchymosis, skin wounds in healing stages on extremities. clear lungs, RRR, abd soft but distended and pt states no BM x 4 days. no edema. Pt's history is limited, baseline is unknown and labs show significant hyponatremia that may cause mental status issues. At risk for sz, will start to correct slowly and get icu eval. Medicine admit. Will continue to reach out to pt's sons with info provided but correct #s not known.

## 2020-06-21 NOTE — H&P ADULT - ASSESSMENT
Patient is a 89 y.o F w/ PMH HTN and polymyalgia rheumatica (on prednisone) who presents w/ metabolic encephalpoathy in the setting of chronic hypovolemic hyponatremia likely multifactorial-thiazide use, poor solute intake, possible SIADH

## 2020-06-21 NOTE — ED ADULT TRIAGE NOTE - CHIEF COMPLAINT QUOTE
arrives ems with c/o continued weakness,decreased appetite..seen at other hospital 6/8 for fall  with "failure to thrive"   pt alert,oriented x2. fs 190 by ems. denies  pain  . a states pt constipated . no bm x 4 days. lle wound,sutures removed on wed. area appears sl red  .. ramón sheffield [phone].   son [phone]

## 2020-06-21 NOTE — H&P ADULT - ATTENDING COMMENTS
Patient seen and examined in ED.  a 89 y.o F w/ PMH HTN who presents w/ progressive weakness and poor PO intake. She was AOx1 at encounter and did not answer questions appropriately - seen in Ed with Na 108- REjected by ED- Nephrology consult already called  . Notable labs: Na+ 108. S/p tylenol x1, amlodipine 2.5 x1,  cc/hr  PE VS Tmax 98.4 HR 85-88 /74 -192/80 RR 16-18 SPO2 72=235% on RA  alert Ox1 , nad- Lung-cta, Cor rrr, abd - obese soft n/t, ext -e/c/c- multiple small skin breaks and erythema to calf and forearm  neuro ox1- able to move extremities  labs na 108/ Cl74/ bun 25/ creat 1.07  abs xray6/21 - no sbo. Ekg NSR 74 bpm  A/p hyponatremia/ Htn uncontrolled/ ? change in mentation Vs dementia ?  # Hyponatremia na 108- MICU rejected - f/u Nephrology recs for IVF to correct Na- neuro checks  and BMP q4 hrs.  # HTN- C/w norvasc 2.5 mg started in ED- Ct of Head wo contrast. mon tsh/ b12 levels  # Ch in ms most likely sec to Hyponatremia   # Dvt proph- hep sq Patient seen and examined in ED.  a 89 y.o F w/ PMH HTN who presents w/ progressive weakness and poor PO intake. She was AOx1 at encounter and did not answer questions appropriately - seen in Ed with Na 108- REjected by ED- Nephrology consult already called. patient answers no to all ros questions.  . Notable labs: Na+ 108. S/p tylenol x1, amlodipine 2.5 x1,  cc/hr  PE VS Tmax 98.4 HR 85-88 /74 -192/80 RR 16-18 SPO2 26=066% on RA  alert Ox1 , nad- Lung-cta, Cor rrr, abd - obese soft n/t, ext -e/c/c- multiple small skin breaks and erythema to calf and forearm  neuro ox1- able to move extremities  labs na 108/ Cl74/ bun 25/ creat 1.07  abs xray6/21 - no sbo. Ekg NSR 74 bpm  A/p hyponatremia/ Htn uncontrolled/ change in mentation x 1 month  sec to Hyponatremia ?, leukocytosis with neutrophil predominance.  # Hyponatremia na 108- MICU rejected - f/u Nephrology recs for IVF to correct Na- neuro checks  and BMP q4 hrs.  # leukocytosis- WBC 16.5 with 84 % nuetr- ord CXR, Urine culture- Start ceftriaxone imperially. Will send blood culture if any fevers.   # HTN- C/w norvasc 2.5 mg started in ED- Ct of Head wo contrast. mon tsh/ b12 levels  # Ch in ms most likely sec to Hyponatremia - ct of head w/o contrast to asses.  # Dvt proph- hep sq  Need [facility] [place] [facility]ST[facility] Men[place]aca at [phone] that HCP is Son Saurav- will call Saurav for Advanced directives. Patient seen and examined in ED.  a 89 y.o F w/ PMH HTN who presents w/ progressive weakness and poor PO intake. She was AOx1 at encounter and did not answer questions appropriately - seen in Ed with Na 108- REjected by ED- Nephrology consult already called. patient answers no to all ros questions.  . Notable labs: Na+ 108. S/p tylenol x1, amlodipine 2.5 x1,  cc/hr  PE VS Tmax 98.4 HR 85-88 /74 -192/80 RR 16-18 SPO2 24=725% on RA  alert Ox1 , nad- Lung-cta, Cor rrr, abd - obese soft n/t, ext -e/c/c- multiple small skin breaks and erythema to calf and forearm  neuro ox1- able to move extremities  labs na 108/ Cl 74/ bun 25/ creat 1.07  abs xray6/21 - no sbo. Ekg NSR 74 bpm  A/p hyponatremia/ Htn uncontrolled/ change in mentation x 1 month  sec to Hyponatremia ?, leukocytosis with neutrophil predominance.  # Hyponatremia na 108- MICU rejected - f/u Nephrology recs for IVF to correct Na- neuro checks  and BMP q4 hrs.  Most likely sec to HCTZ. ivf hydration . send urine and serum and lytes. do not correct 10 meq in 24 hrs.   # Leukocytosis- WBC 16.5 with 84 % neutro- ord CXR, Urine culture- Start ceftriaxone imperially. Will send blood culture if any fevers.   # HTN- C/w norvasc 2.5 mg started in ED- Ct of Head wo contrast. monitor tsh/ b12 levels  # Ch in ms most likely sec to Hyponatremia - ct of head w/o contrast to asses.  # Dvt proph- hep sq  Need [facility] - [facility]STINA [place] at [phone] that HCP is Son Saurav- will call Saurav for Advanced directives. Patient seen and examined in ED.  a 89 y.o F w/ PMH HTN, spinal stenosis and PMR-polymyalgia Rheumatica on pred 5 mg tid, who presents w/ progressive weakness and poor PO intake. She was AOx1 at encounter and did not answer questions appropriately - seen in Ed with Na 108- REjected by ED- Nephrology consult already called. patient answers no to all ros questions.  . Notable labs: Na+ 108. S/p tylenol x1, amlodipine 2.5 x1,  cc/hr  PE VS Tmax 98.4 HR 85-88 /74 -192/80 RR 16-18 SPO2 06=890% on RA  alert Ox1 , nad- Lung-cta, Cor rrr, abd - obese soft n/t, ext -e/c/c- multiple small skin breaks and erythema to calf and forearm  neuro ox1- able to move extremities  labs na 108/ Cl 74/ bun 25/ creat 1.07  abs xray6/21 - no sbo. Ekg NSR 74 bpm  A/p hyponatremia/ Htn uncontrolled/ change in mentation x 1 month  sec to Hyponatremia ?, leukocytosis with neutrophil predominance.  # Hyponatremia na 108- MICU rejected - f/u Nephrology recs for IVF to correct Na- neuro checks  and BMP q4 hrs.  Most likely sec to HCTZ. ivf hydration as per renal. send urine and serum and lytes. do not correct >10 meq in 24 hrs.   # Leukocytosis- WBC 16.5 with 84 % neutro- ord CXR, Urine culture- Start ceftriaxone imperially. Will send blood culture if any fevers.   # HTN- C/w norvasc 2.5 mg started in ED- Ct of Head wo contrast. monitor tsh/ b12 levels  # Ch in ms most likely sec to Hyponatremia - ct of head w/o contrast to asses.  # PMR- c/e pred 5 mg tid with food  # Dvt proph- hep sq, Senna [facility].  Need [facility] at [phone] that HCP is Son Saurav called for Advanced directives.- notes mother wants to be resuscitated.  Patient is full code  Called PCP dr BRAXTON Ragsdale [phone] and updated

## 2020-06-21 NOTE — ED PROVIDER NOTE - OBJECTIVE STATEMENT
89 year old woman 89 year old woman with PMH of HTN pw weakness. Pt unable to give hx, states she is having LBP that just started. Pt unable to give reasons why she is here in the hospital. 89 year old woman with PMH of HTN, spinal stenosis pw weakness. Pt unable to give hx, states she is having LBP that just started. Pt unable to give reasons why she is here in the hospital.   Update 17:00: Spoke with Son at [phone], As per son Pt has been her usual state of health except one month ago when she started becoming weaker and sustained a fall earlier this month c/b injury to R leg and had stitches placed. Pt has been declining since then. She has decreased PO intake. Son states she has not been having fevers, CP, SOB, N/V/D, edema, or rashes.

## 2020-06-21 NOTE — CONSULT NOTE ADULT - SUBJECTIVE AND OBJECTIVE BOX
note in progress    CHIEF COMPLAINT:    HPI:    PAST MEDICAL & SURGICAL HISTORY:      FAMILY HISTORY:      SOCIAL HISTORY:  Smoking: __ packs x ___ years  EtOH Use:  Marital Status:  Occupation:  Recent Travel:  Country of Birth:  Advance Directives:    Allergies    No Known Allergies    Intolerances        HOME MEDICATIONS:    REVIEW OF SYSTEMS:  Constitutional:   Eyes:  ENT:  CV:  Resp:  GI:  :  MSK:  Integumentary:  Neurological:  Psychiatric:  Endocrine:  Hematologic/Lymphatic:  Allergic/Immunologic:  [ ] All other systems negative  [ ] Unable to assess ROS because ________    OBJECTIVE:  ICU Vital Signs Last 24 Hrs  T(C): 36.9 (21 Jun 2020 12:24), Max: 36.9 (21 Jun 2020 12:24)  T(F): 98.4 (21 Jun 2020 12:24), Max: 98.4 (21 Jun 2020 12:24)  HR: 85 (21 Jun 2020 13:47) (85 - 88)  BP: 192/80 (21 Jun 2020 13:47) (175/74 - 192/80)  BP(mean): --  ABP: --  ABP(mean): --  RR: 16 (21 Jun 2020 13:47) (16 - 18)  SpO2: 100% (21 Jun 2020 13:47) (99% - 100%)        CAPILLARY BLOOD GLUCOSE          PHYSICAL EXAM:  General:   HEENT:   Lymph Nodes:  Neck:   Respiratory:   Cardiovascular:   Abdomen:   Extremities:   Skin:   Neurological:  Psychiatry:    HOSPITAL MEDICATIONS:  MEDICATIONS  (STANDING):  sodium chloride 0.9%. 1000 milliLiter(s) (100 mL/Hr) IV Continuous <Continuous>    MEDICATIONS  (PRN):      LABS:                        10.4   16.57 )-----------( 423      ( 21 Jun 2020 14:15 )             29.1     06-21    108<LL>  |  74<L>  |  25<H>  ----------------------------<  129<H>  4.7   |  19<L>  |  1.07    Ca    9.8      21 Jun 2020 13:00    TPro  6.0  /  Alb  3.5  /  TBili  0.5  /  DBili  x   /  AST  35<H>  /  ALT  18  /  AlkPhos  58  06-21          Venous Blood Gas:  06-21 @ 13:00  7.43/40/48/26/85.2  VBG Lactate: 1.6      MICROBIOLOGY:     RADIOLOGY:  [ ] Reviewed and interpreted by me    EKG: CHIEF COMPLAINT: Hyponatremia    HPI:  89 year old woman with PMH of HTN presents w/ weakness. Pt is AOx2 on exam. The pt denies pain, SOB, cough, nausea, vomiting, CP, palpitations but is an unreliable historian.  She is unsure where she lives and why she is in the hospital. The pt reports normal PO intake. ED attempted to call facility/family but were unable to reach.        PAST MEDICAL & SURGICAL HISTORY:      FAMILY HISTORY:      SOCIAL HISTORY:  Denies hx of smoking, drinking or illicit drug use. Walks via walker.    Allergies    No Known Allergies    Intolerances        HOME MEDICATIONS:    REVIEW OF SYSTEMS:      CONSTITUTIONAL: No weakness, fevers or chills  EYES/ENT: No visual changes;  No vertigo or throat pain   NECK: No pain or stiffness  RESPIRATORY: No cough, wheezing, hemoptysis; No shortness of breath  CARDIOVASCULAR: No chest pain or palpitations  GASTROINTESTINAL: No abdominal or epigastric pain. No nausea, vomiting, or hematemesis; No diarrhea or constipation. No melena or hematochezia.  GENITOURINARY: No dysuria, frequency or hematuria  NEUROLOGICAL: No numbness or weakness  SKIN: No itching, burning, rashes, or lesions   All other review of systems is negative unless indicated above.      OBJECTIVE:  ICU Vital Signs Last 24 Hrs  T(C): 36.9 (21 Jun 2020 12:24), Max: 36.9 (21 Jun 2020 12:24)  T(F): 98.4 (21 Jun 2020 12:24), Max: 98.4 (21 Jun 2020 12:24)  HR: 85 (21 Jun 2020 13:47) (85 - 88)  BP: 192/80 (21 Jun 2020 13:47) (175/74 - 192/80)  BP(mean): --  ABP: --  ABP(mean): --  RR: 16 (21 Jun 2020 13:47) (16 - 18)  SpO2: 100% (21 Jun 2020 13:47) (99% - 100%)        CAPILLARY BLOOD GLUCOSE      PHYSICAL EXAM:    GENERAL: Comfortable, no acute distress   HEAD:  Normocephalic, atraumatic  HEENT: Moist mucous membranes  NECK: Supple, No JVD  NERVOUS SYSTEM:  Alert & Oriented X2  CHEST/LUNG: Clear to auscultation bilaterally  HEART: Regular rate and rhythm, no murmur   ABDOMEN: distended, non-tender  EXTREMITIES:   No clubbing, cyanosis, or edema  MUSCULOSKELETAL- No muscle tenderness, no joint tenderness  SKIN- warm and dry, no rash             HOSPITAL MEDICATIONS:  MEDICATIONS  (STANDING):  sodium chloride 0.9%. 1000 milliLiter(s) (100 mL/Hr) IV Continuous <Continuous>    MEDICATIONS  (PRN):      LABS:                        10.4   16.57 )-----------( 423      ( 21 Jun 2020 14:15 )             29.1     06-21    108<LL>  |  74<L>  |  25<H>  ----------------------------<  129<H>  4.7   |  19<L>  |  1.07    Ca    9.8      21 Jun 2020 13:00    TPro  6.0  /  Alb  3.5  /  TBili  0.5  /  DBili  x   /  AST  35<H>  /  ALT  18  /  AlkPhos  58  06-21          Venous Blood Gas:  06-21 @ 13:00  7.43/40/48/26/85.2  VBG Lactate: 1.6      MICROBIOLOGY:     RADIOLOGY:  [x ] Reviewed and interpreted by me    89 year old woman with PMH of HTN presents w/ weakness, found to be hyponatremic to 108. MICU consulted for management of hyponatremia.     #Hyponatremia  Pt w/ hyponatremia to 108  Pt hemodynamically stable and is AOx2  f/u nephro recs for correction of hyponatremia  Trend BMP  Obtain urine studies(urine electrolytes, urine/serum electrolytes) to assess etiology of hyponatremia    Pt is not a MICU candidate at this time. Please call back with any questions.

## 2020-06-21 NOTE — ED PROVIDER NOTE - CRITICAL CARE PROVIDED
direct patient care (not related to procedure)/consult w/ pt's family directly relating to pts condition/consultation with other physicians/documentation

## 2020-06-21 NOTE — ED PROVIDER NOTE - PHYSICAL EXAMINATION
GENERAL: NAD, well-groomed, well-developed  HEAD:  Atraumatic, Normocephalic  EYES: EOMI, PERRLA, conjunctiva and sclera clear  ENMT: No tonsillar erythema, exudates, or enlargement;dry mucous membranes  NECK: Supple, No JVD, Normal thyroid  CHEST/LUNG: Clear to ausculation bilaterally; No rales, rhonchi, wheezing, or rubs  HEART: Regular rate and rhythm; No murmurs, rubs, or gallops  ABDOMEN: Soft, Nontender, distended; Bowel sounds present. Tympanic on percussion  EXTREMITIES:  2+ Peripheral Pulses, No clubbing, cyanosis, or edema  LYMPH: No lymphadenopathy noted  SKIN: No rashes or lesions. One open wound on Right Upper Extremity and large open wound in Left Lower Extremity without erythema or purulence  NERVOUS SYSTEM:  Alert & Oriented X3, Motor Strength 5/5 B/L upper and lower extremities; GENERAL: NAD  HEAD:  Atraumatic, Normocephalic  EYES: EOMI, PERRLA, conjunctiva and sclera clear  ENMT: No tonsillar erythema, exudates, or enlargement; dry mucous membranes  NECK: Supple, No JVD  CHEST/LUNG: Clear to ausculation bilaterally; No rales, rhonchi, wheezing, or rubs  HEART: Regular rate and rhythm; No murmurs, rubs, or gallops  ABDOMEN: Soft, Nontender, distended; Bowel sounds present. Tympanic on percussion  EXTREMITIES:  2+ Peripheral Pulses, No clubbing, cyanosis, or edema  LYMPH: No lymphadenopathy noted  SKIN: No rashes or lesions. One open wound on Right Upper Extremity and large open wound in Left Lower Extremity without erythema or purulence  NERVOUS SYSTEM:  Alert & Oriented X1 to person, Motor Strength 5/5 B/L upper and lower extremities;

## 2020-06-21 NOTE — H&P ADULT - PROBLEM SELECTOR PLAN 2
PMH HTN; home medication includes hydrochlorothiaizde and losartan  -hold thiazide in the setting of hyponatremia; will need to discontinue on discharge as well  -c/w amlodipine 2.5 mg daily, uptitrate as needed   -VS q8h PMH HTN; home medication includes hydrochlorothiaizde and losartan  -hold thiazide in the setting of hyponatremia; will need to discontinue on discharge as well  -c/w amlodipine 2.5 mg daily, uptitrate as needed   -If sbp >180, will give hy dralazine 5 mg IVP x1 and reassess  -VS q8h

## 2020-06-21 NOTE — ED PROVIDER NOTE - NS ED ROS FT
As per son:    CONSTITUTIONAL: No weakness, fevers or chills, + decrease intake  EYES: No visual changes; No blurry vision  ENT:  No pain or stiffness; No vertigo or throat pain  RESPIRATORY: No cough, wheezing, hemoptysis; No shortness of breath  CARDIOVASCULAR: No chest pain or palpitations  GASTROINTESTINAL: No abdominal or epigastric pain. No nausea, vomiting, or hematemesis; No diarrhea,  + constipation. No melena or hematochezia.  GENITOURINARY: No dysuria, frequency or hematuria  NEUROLOGICAL: No numbness, No HA  SKIN: No itching, rashes  MSK: no joint pain, no muscle pain, +LBP

## 2020-06-21 NOTE — H&P ADULT - HISTORY OF PRESENT ILLNESS
**THIS NOTE IS INCOMPLETE**    Patient is a 89 y.o F w/ PMH HTN who presents w/ progressive weakness and poor PO intake. She was AOx1 at encounter and did not answer questions appropriately     In the ED, vitals Tmax 98.4 HR 85-88 /74 -192/80 RR 16-18 SPO2 96=374% on RA. Notable labs: Na+ 108. S/p tylenol x1, amlodipine 2.5 x1,  cc/hr Patient is a 89 y.o F w/ PMH HTN and polymyalgia rheumatica (on prednisone) who presents w/ progressive weakness and poor PO intake. She was AOx1 at encounter and did not answer questions appropriately. Son, Saurav Cooper, was called for collateral. Per son, patient was in her usual state of health until about three weeks ago she sustained a fall w/ laceration to her left leg. Since then, she had progressively become confused and lethargic. Since the fall, he hired a 24 hour aid who denied any N/V, fever/chills, or sick contacts. She had noticed that she had a poor appetite and would eat less.     Of note, in end of May, patient had presented to her PCP Dr. Edita Irvin for diarrhea for which she was prescribed imodium. Per documentation, there was a clear clinical deterioration since her last annual physical.     Per son, at baseline she is AOX3 and able to use an ipad to face time her grand children. She ambulated w/ a walker and was able to microwave pre-prepared food for herself. Her aid administers her medications.     In the ED, vitals Tmax 98.4 HR 85-88 /74 -192/80 RR 16-18 SPO2 35=072% on RA. Notable labs: Na+ 108. S/p tylenol x1, amlodipine 2.5 x1,  cc/hr at 3 pM

## 2020-06-21 NOTE — H&P ADULT - PROBLEM SELECTOR PLAN 1
P/w Na+ of 108, baseline is 144 (10/2019) likely w/ symptomatic chronic hypovolemic hyponatremia. Likely multifactorial 2/2 decreased solute intake, thiazide use, SIADH  -f/u serum osmolality, Uosm, Joan, Ucreatinine  -s/p 15 cc 3% HTS,  cc/hr x 3 hrs  -f/u STAT repeat BMP  -do not over correct for >6-8 meq/24 hours. Goal for tomorrow evening is 114 P/w Na+ of 108, baseline is 144 (10/2019) likely w/ symptomatic chronic hypovolemic hyponatremia. Likely multifactorial 2/2 decreased solute intake, thiazide use, SIADH  -f/u serum osmolality, Uosm, Joan, Ucreatinine  -s/p 15 cc 3% HTS,  cc/hr x 3 hrs  -f/u STAT repeat BMP  -do not over correct for >6-8 meq/24 hours. Goal for tomorrow evening is 114  -NPO for now, f/u Uosm. If Uosm <300, 2/2 decrease solute intake so advance diet to DASH/TLC. If Uosm >300, concern for SIADH, start fluid restriction<1 L diet  -F/u CTH for AMS and to assess for possible mass that may cause SIADH

## 2020-06-22 ENCOUNTER — TRANSCRIPTION ENCOUNTER (OUTPATIENT)
Age: 85
End: 2020-06-22

## 2020-06-22 DIAGNOSIS — R79.89 OTHER SPECIFIED ABNORMAL FINDINGS OF BLOOD CHEMISTRY: ICD-10-CM

## 2020-06-22 LAB
ANION GAP SERPL CALC-SCNC: 11 MMO/L — SIGNIFICANT CHANGE UP (ref 7–14)
ANION GAP SERPL CALC-SCNC: 12 MMO/L — SIGNIFICANT CHANGE UP (ref 7–14)
ANION GAP SERPL CALC-SCNC: 13 MMO/L — SIGNIFICANT CHANGE UP (ref 7–14)
ANION GAP SERPL CALC-SCNC: 15 MMO/L — HIGH (ref 7–14)
ANION GAP SERPL CALC-SCNC: 15 MMO/L — HIGH (ref 7–14)
BASOPHILS # BLD AUTO: 0.02 K/UL — SIGNIFICANT CHANGE UP (ref 0–0.2)
BASOPHILS NFR BLD AUTO: 0.1 % — SIGNIFICANT CHANGE UP (ref 0–2)
BUN SERPL-MCNC: 17 MG/DL — SIGNIFICANT CHANGE UP (ref 7–23)
BUN SERPL-MCNC: 18 MG/DL — SIGNIFICANT CHANGE UP (ref 7–23)
BUN SERPL-MCNC: 18 MG/DL — SIGNIFICANT CHANGE UP (ref 7–23)
BUN SERPL-MCNC: 19 MG/DL — SIGNIFICANT CHANGE UP (ref 7–23)
BUN SERPL-MCNC: 21 MG/DL — SIGNIFICANT CHANGE UP (ref 7–23)
CALCIUM SERPL-MCNC: 9 MG/DL — SIGNIFICANT CHANGE UP (ref 8.4–10.5)
CALCIUM SERPL-MCNC: 9.1 MG/DL — SIGNIFICANT CHANGE UP (ref 8.4–10.5)
CALCIUM SERPL-MCNC: 9.2 MG/DL — SIGNIFICANT CHANGE UP (ref 8.4–10.5)
CHLORIDE SERPL-SCNC: 79 MMOL/L — LOW (ref 98–107)
CHLORIDE SERPL-SCNC: 80 MMOL/L — LOW (ref 98–107)
CHLORIDE SERPL-SCNC: 84 MMOL/L — LOW (ref 98–107)
CHLORIDE SERPL-SCNC: 85 MMOL/L — LOW (ref 98–107)
CHLORIDE SERPL-SCNC: 85 MMOL/L — LOW (ref 98–107)
CO2 SERPL-SCNC: 19 MMOL/L — LOW (ref 22–31)
CO2 SERPL-SCNC: 20 MMOL/L — LOW (ref 22–31)
CO2 SERPL-SCNC: 20 MMOL/L — LOW (ref 22–31)
CO2 SERPL-SCNC: 21 MMOL/L — LOW (ref 22–31)
CO2 SERPL-SCNC: 21 MMOL/L — LOW (ref 22–31)
CREAT SERPL-MCNC: 0.97 MG/DL — SIGNIFICANT CHANGE UP (ref 0.5–1.3)
CREAT SERPL-MCNC: 1.01 MG/DL — SIGNIFICANT CHANGE UP (ref 0.5–1.3)
CREAT SERPL-MCNC: 1.08 MG/DL — SIGNIFICANT CHANGE UP (ref 0.5–1.3)
CREAT SERPL-MCNC: 1.08 MG/DL — SIGNIFICANT CHANGE UP (ref 0.5–1.3)
CREAT SERPL-MCNC: 1.18 MG/DL — SIGNIFICANT CHANGE UP (ref 0.5–1.3)
CULTURE RESULTS: SIGNIFICANT CHANGE UP
EOSINOPHIL # BLD AUTO: 0 K/UL — SIGNIFICANT CHANGE UP (ref 0–0.5)
EOSINOPHIL NFR BLD AUTO: 0 % — SIGNIFICANT CHANGE UP (ref 0–6)
GLUCOSE SERPL-MCNC: 107 MG/DL — HIGH (ref 70–99)
GLUCOSE SERPL-MCNC: 116 MG/DL — HIGH (ref 70–99)
GLUCOSE SERPL-MCNC: 117 MG/DL — HIGH (ref 70–99)
GLUCOSE SERPL-MCNC: 126 MG/DL — HIGH (ref 70–99)
GLUCOSE SERPL-MCNC: 126 MG/DL — HIGH (ref 70–99)
HCT VFR BLD CALC: 28.6 % — LOW (ref 34.5–45)
HGB BLD-MCNC: 10.4 G/DL — LOW (ref 11.5–15.5)
IMM GRANULOCYTES NFR BLD AUTO: 0.9 % — SIGNIFICANT CHANGE UP (ref 0–1.5)
LYMPHOCYTES # BLD AUTO: 1.09 K/UL — SIGNIFICANT CHANGE UP (ref 1–3.3)
LYMPHOCYTES # BLD AUTO: 6 % — LOW (ref 13–44)
MAGNESIUM SERPL-MCNC: 1.9 MG/DL — SIGNIFICANT CHANGE UP (ref 1.6–2.6)
MAGNESIUM SERPL-MCNC: 1.9 MG/DL — SIGNIFICANT CHANGE UP (ref 1.6–2.6)
MAGNESIUM SERPL-MCNC: 2.1 MG/DL — SIGNIFICANT CHANGE UP (ref 1.6–2.6)
MAGNESIUM SERPL-MCNC: 2.1 MG/DL — SIGNIFICANT CHANGE UP (ref 1.6–2.6)
MAGNESIUM SERPL-MCNC: 2.3 MG/DL — SIGNIFICANT CHANGE UP (ref 1.6–2.6)
MCHC RBC-ENTMCNC: 29.8 PG — SIGNIFICANT CHANGE UP (ref 27–34)
MCHC RBC-ENTMCNC: 36.4 % — HIGH (ref 32–36)
MCV RBC AUTO: 81.9 FL — SIGNIFICANT CHANGE UP (ref 80–100)
MONOCYTES # BLD AUTO: 1.03 K/UL — HIGH (ref 0–0.9)
MONOCYTES NFR BLD AUTO: 5.6 % — SIGNIFICANT CHANGE UP (ref 2–14)
NEUTROPHILS # BLD AUTO: 15.98 K/UL — HIGH (ref 1.8–7.4)
NEUTROPHILS NFR BLD AUTO: 87.4 % — HIGH (ref 43–77)
NRBC # FLD: 0 K/UL — SIGNIFICANT CHANGE UP (ref 0–0)
PHOSPHATE SERPL-MCNC: 2.6 MG/DL — SIGNIFICANT CHANGE UP (ref 2.5–4.5)
PHOSPHATE SERPL-MCNC: 2.6 MG/DL — SIGNIFICANT CHANGE UP (ref 2.5–4.5)
PHOSPHATE SERPL-MCNC: 3 MG/DL — SIGNIFICANT CHANGE UP (ref 2.5–4.5)
PHOSPHATE SERPL-MCNC: 4.2 MG/DL — SIGNIFICANT CHANGE UP (ref 2.5–4.5)
PHOSPHATE SERPL-MCNC: 5.4 MG/DL — HIGH (ref 2.5–4.5)
PLATELET # BLD AUTO: 432 K/UL — HIGH (ref 150–400)
PMV BLD: 9.3 FL — SIGNIFICANT CHANGE UP (ref 7–13)
POTASSIUM SERPL-MCNC: 4.1 MMOL/L — SIGNIFICANT CHANGE UP (ref 3.5–5.3)
POTASSIUM SERPL-MCNC: 4.2 MMOL/L — SIGNIFICANT CHANGE UP (ref 3.5–5.3)
POTASSIUM SERPL-MCNC: 4.8 MMOL/L — SIGNIFICANT CHANGE UP (ref 3.5–5.3)
POTASSIUM SERPL-SCNC: 4.1 MMOL/L — SIGNIFICANT CHANGE UP (ref 3.5–5.3)
POTASSIUM SERPL-SCNC: 4.2 MMOL/L — SIGNIFICANT CHANGE UP (ref 3.5–5.3)
POTASSIUM SERPL-SCNC: 4.8 MMOL/L — SIGNIFICANT CHANGE UP (ref 3.5–5.3)
RBC # BLD: 3.49 M/UL — LOW (ref 3.8–5.2)
RBC # FLD: 12.3 % — SIGNIFICANT CHANGE UP (ref 10.3–14.5)
SARS-COV-2 RNA SPEC QL NAA+PROBE: SIGNIFICANT CHANGE UP
SODIUM SERPL-SCNC: 113 MMOL/L — CRITICAL LOW (ref 135–145)
SODIUM SERPL-SCNC: 116 MMOL/L — CRITICAL LOW (ref 135–145)
SODIUM SERPL-SCNC: 116 MMOL/L — CRITICAL LOW (ref 135–145)
SODIUM SERPL-SCNC: 117 MMOL/L — CRITICAL LOW (ref 135–145)
SODIUM SERPL-SCNC: 118 MMOL/L — CRITICAL LOW (ref 135–145)
SPECIMEN SOURCE: SIGNIFICANT CHANGE UP
T3 SERPL-MCNC: 64.8 NG/DL — LOW (ref 80–200)
T4 AB SER-ACNC: 7.13 UG/DL — SIGNIFICANT CHANGE UP (ref 5.1–13)
TSH SERPL-MCNC: 0.2 UIU/ML — LOW (ref 0.27–4.2)
WBC # BLD: 18.29 K/UL — HIGH (ref 3.8–10.5)
WBC # FLD AUTO: 18.29 K/UL — HIGH (ref 3.8–10.5)

## 2020-06-22 PROCEDURE — 99233 SBSQ HOSP IP/OBS HIGH 50: CPT | Mod: GC

## 2020-06-22 PROCEDURE — 70450 CT HEAD/BRAIN W/O DYE: CPT | Mod: 26

## 2020-06-22 RX ADMIN — HEPARIN SODIUM 5000 UNIT(S): 5000 INJECTION INTRAVENOUS; SUBCUTANEOUS at 05:41

## 2020-06-22 RX ADMIN — Medication 5 MILLIGRAM(S): at 22:59

## 2020-06-22 RX ADMIN — AMLODIPINE BESYLATE 2.5 MILLIGRAM(S): 2.5 TABLET ORAL at 05:40

## 2020-06-22 RX ADMIN — HEPARIN SODIUM 5000 UNIT(S): 5000 INJECTION INTRAVENOUS; SUBCUTANEOUS at 18:03

## 2020-06-22 RX ADMIN — Medication 5 MILLIGRAM(S): at 05:40

## 2020-06-22 RX ADMIN — Medication 5 MILLIGRAM(S): at 15:19

## 2020-06-22 NOTE — PROGRESS NOTE ADULT - SUBJECTIVE AND OBJECTIVE BOX
*************************************  Rene Maria M.D., Ph.D. | PGY-2  Department of Internal Medicine  [phone] ([facility]) | [id number] ([facility])  *************************************      Patient is a 89y old  Female who presents with a chief complaint of weakness (2020 17:36)      SUBJECTIVE / OVERNIGHT EVENTS:  Sodium was corrected from 108 -> 113 overnight. Fluid restriction diet was initiated by night team. Patient was seen and examined at bedside. Awake and follows commands. Reports feeling well. Denies fever, chills, nausea, vomiting, chest pain, SOB, or dizziness. Otherwise ROS as below.    MEDICATIONS  (STANDING):  amLODIPine   Tablet 2.5 milliGRAM(s) Oral daily  heparin   Injectable 5000 Unit(s) SubCutaneous every 12 hours  predniSONE   Tablet 5 milliGRAM(s) Oral three times a day    MEDICATIONS  (PRN):      CAPILLARY BLOOD GLUCOSE        I&O's Summary    2020 07:01  -  2020 07:00  --------------------------------------------------------  IN: 0 mL / OUT: 1500 mL / NET: -1500 mL        Vital Signs Last 24 Hrs  T(C): 36.8 (2020 05:35), Max: 36.9 (2020 12:24)  T(F): 98.2 (2020 05:35), Max: 98.4 (2020 12:24)  HR: 85 (2020 05:35) (85 - 92)  BP: 128/45 (2020 05:35) (128/45 - 192/80)  BP(mean): --  RR: 17 (2020 05:35) (16 - 18)  SpO2: 98% (2020 05:35) (98% - 100%)    PHYSICAL EXAM:  GENERAL: NAD, awake, alert, following commands  HEAD: Atraumatic, Normocephalic  EYES: EOMI, PERRL, conjunctiva and sclera clear  NECK: Supple  CHEST/LUNG: Clear to auscultation bilaterally; No wheezes or crackles  HEART: Normal S1/S2; Regular rate and rhythm; +systolic murmurs  ABDOMEN: Soft, Nontender, Nondistended  EXTREMITIES: 1+ Peripheral Pulses; No clubbing, cyanosis, or edema; L   PSYCH: A&Ox2 (self, location)  NEUROLOGY: no focal neurologic deficit in CN II-XII  SKIN: patchy ecchymoses BLE; lateral LLE with mild tenderness    LABS:                        10.4   18.29 )-----------( 432      ( 2020 05:36 )             28.6      06-    113<LL>  |  79<L>  |  21  ----------------------------<  116<H>  4.8   |  19<L>  |  0.97    Ca    9.0      2020 01:00  Phos  5.4     -  Mg     2.3     -    TPro  6.0  /  Alb  3.5  /  TBili  0.5  /  DBili  x   /  AST  35<H>  /  ALT  18  /  AlkPhos  58  -          Urinalysis Basic - ( 2020 16:15 )    Color: LIGHT YELLOW / Appearance: CLEAR / S.013 / pH: 7.5  Gluc: NEGATIVE / Ketone: NEGATIVE  / Bili: NEGATIVE / Urobili: NORMAL   Blood: NEGATIVE / Protein: 20 / Nitrite: NEGATIVE   Leuk Esterase: NEGATIVE / RBC: 3-5 / WBC 0-2   Sq Epi: FEW / Non Sq Epi: x / Bacteria: NEGATIVE        RADIOLOGY & ADDITIONAL TESTS:    Imaging Personally Reviewed:    Consultant(s) Notes Reviewed:      Care Discussed with Consultants/Other Providers: *************************************  Rene Maria M.D., Ph.D. | PGY-2  Department of Internal Medicine  [phone] ([facility]) | [id number] ([facility])  *************************************      Patient is a 89y old  Female who presents with a chief complaint of weakness (2020 17:36)      SUBJECTIVE / OVERNIGHT EVENTS:  Abd xray did not reveal SBO pathology. Sodium was corrected from 108 -> 113 overnight. Fluid restriction diet was initiated by night team. Patient was seen and examined at bedside. Awake and follows commands. Reports feeling well. Denies abdominal pain, fever, chills, nausea, vomiting, chest pain, SOB, or dizziness.    MEDICATIONS  (STANDING):  amLODIPine   Tablet 2.5 milliGRAM(s) Oral daily  heparin   Injectable 5000 Unit(s) SubCutaneous every 12 hours  predniSONE   Tablet 5 milliGRAM(s) Oral three times a day    MEDICATIONS  (PRN):      CAPILLARY BLOOD GLUCOSE        I&O's Summary    2020 07:01  -  2020 07:00  --------------------------------------------------------  IN: 0 mL / OUT: 1500 mL / NET: -1500 mL        Vital Signs Last 24 Hrs  T(C): 36.8 (2020 05:35), Max: 36.9 (2020 12:24)  T(F): 98.2 (2020 05:35), Max: 98.4 (2020 12:24)  HR: 85 (2020 05:35) (85 - 92)  BP: 128/45 (2020 05:35) (128/45 - 192/80)  BP(mean): --  RR: 17 (2020 05:35) (16 - 18)  SpO2: 98% (2020 05:35) (98% - 100%)    PHYSICAL EXAM:  GENERAL: NAD, awake, alert, following commands  HEAD: Atraumatic, Normocephalic  EYES: EOMI, PERRL, conjunctiva and sclera clear  NECK: Supple  CHEST/LUNG: Clear to auscultation bilaterally; No wheezes or crackles  HEART: Normal S1/S2; Regular rate and rhythm; +systolic murmurs  ABDOMEN: Soft, Nontender, Nondistended  EXTREMITIES: 1+ Peripheral Pulses; No clubbing, cyanosis, or edema; L   PSYCH: A&Ox2 (self, location)  NEUROLOGY: no focal neurologic deficit in CN II-XII  SKIN: patchy ecchymoses BLE; lateral LLE with mild tenderness    LABS:                        10.4   18.29 )-----------( 432      ( 2020 05:36 )             28.6      06-22    113<LL>  |  79<L>  |  21  ----------------------------<  116<H>  4.8   |  19<L>  |  0.97    Ca    9.0      2020 01:00  Phos  5.4     06-  Mg     2.3     06-    TPro  6.0  /  Alb  3.5  /  TBili  0.5  /  DBili  x   /  AST  35<H>  /  ALT  18  /  AlkPhos  58  06-21          Urinalysis Basic - ( 2020 16:15 )    Color: LIGHT YELLOW / Appearance: CLEAR / S.013 / pH: 7.5  Gluc: NEGATIVE / Ketone: NEGATIVE  / Bili: NEGATIVE / Urobili: NORMAL   Blood: NEGATIVE / Protein: 20 / Nitrite: NEGATIVE   Leuk Esterase: NEGATIVE / RBC: 3-5 / WBC 0-2   Sq Epi: FEW / Non Sq Epi: x / Bacteria: NEGATIVE        RADIOLOGY & ADDITIONAL TESTS:    Imaging Personally Reviewed:    Consultant(s) Notes Reviewed:      Care Discussed with Consultants/Other Providers: *************************************  Rene Maria M.D., Ph.D. | PGY-2  Department of Internal Medicine  [phone] ([facility]) | [id number] ([facility])  *************************************      Patient is a 89y old  Female who presents with a chief complaint of weakness (2020 17:36)      SUBJECTIVE / OVERNIGHT EVENTS:  Abd xray did not reveal SBO pathology. Sodium was corrected from 108 -> 113 overnight. Fluid restriction diet was initiated by night team. Patient was seen and examined at bedside. Awake and follows commands. Reports feeling well. Denies abdominal pain, fever, chills, nausea, vomiting, chest pain, SOB, or dizziness.    MEDICATIONS  (STANDING):  amLODIPine   Tablet 2.5 milliGRAM(s) Oral daily  heparin   Injectable 5000 Unit(s) SubCutaneous every 12 hours  predniSONE   Tablet 5 milliGRAM(s) Oral three times a day    MEDICATIONS  (PRN):      CAPILLARY BLOOD GLUCOSE        I&O's Summary    2020 07:01  -  2020 07:00  --------------------------------------------------------  IN: 0 mL / OUT: 1500 mL / NET: -1500 mL        Vital Signs Last 24 Hrs  T(C): 36.8 (2020 05:35), Max: 36.9 (2020 12:24)  T(F): 98.2 (2020 05:35), Max: 98.4 (2020 12:24)  HR: 85 (2020 05:35) (85 - 92)  BP: 128/45 (2020 05:35) (128/45 - 192/80)  BP(mean): --  RR: 17 (2020 05:35) (16 - 18)  SpO2: 98% (2020 05:35) (98% - 100%)    PHYSICAL EXAM:  GENERAL: NAD, awake, alert, following commands  HEAD: Atraumatic, Normocephalic  EYES: EOMI, PERRL, conjunctiva and sclera clear  NECK: Supple  CHEST/LUNG: Clear to auscultation bilaterally; No wheezes or crackles  HEART: Normal S1/S2; Regular rate and rhythm; +systolic murmurs  ABDOMEN: Soft, Nontender, Nondistended  EXTREMITIES: 1+ Peripheral Pulses; No clubbing, cyanosis, or edema; L   PSYCH: A&Ox2 (self, location)  NEUROLOGY: no focal neurologic deficit in CN II-XII  SKIN: patchy ecchymoses BLE; lateral LLE with mild tenderness    LABS:                        10.4   18.29 )-----------( 432      ( 2020 05:36 )             28.6      06-22    113<LL>  |  79<L>  |  21  ----------------------------<  116<H>  4.8   |  19<L>  |  0.97    Ca    9.0      2020 01:00  Phos  5.4     06-  Mg     2.3     -    TPro  6.0  /  Alb  3.5  /  TBili  0.5  /  DBili  x   /  AST  35<H>  /  ALT  18  /  AlkPhos  58  06-21          Urinalysis Basic - ( 2020 16:15 )    Color: LIGHT YELLOW / Appearance: CLEAR / S.013 / pH: 7.5  Gluc: NEGATIVE / Ketone: NEGATIVE  / Bili: NEGATIVE / Urobili: NORMAL   Blood: NEGATIVE / Protein: 20 / Nitrite: NEGATIVE   Leuk Esterase: NEGATIVE / RBC: 3-5 / WBC 0-2   Sq Epi: FEW / Non Sq Epi: x / Bacteria: NEGATIVE        RADIOLOGY & ADDITIONAL TESTS:    EXAM:  CT BRAIN        PROCEDURE DATE:  2020         INTERPRETATION:  Clinical indication: Altered mental status.    Multiple axial sections were performed from base of skull to vertex without contrast enhancement. Coronal and sagittal constructions were performed as well    This exam compared with prior noncontrast head CT performed on 2014.    Parenchymal volume loss and chronic microvascular ischemic changes are again seen.    There is no evidence of acute hemorrhage or mass or mass effect in the posterior fossa or supratentorial region.    Evaluation of the osseous structures with the appropriate window appears unremarkable    The visualized paranasal sinuses and mastoid and middle ear regions appear clear.    IMPRESSION: No acute hemorrhage mass or mass effect.        LOUIS ROMAN M.D., ATTENDING RADIOLOGIST  This document has been electronically signed. 2020  9:25AM                  Imaging Personally Reviewed: Yes    Consultant(s) Notes Reviewed:  Yes    Care Discussed with Consultants/Other Providers:

## 2020-06-22 NOTE — PHYSICAL THERAPY INITIAL EVALUATION ADULT - LEVEL OF CONSCIOUSNESS, REHAB EVAL
Telephone Encounter by Miladis Zapata RN at 12/07/17 11:08 AM     Author:  Miladis Zapata RN Service:  (none) Author Type:  Registered Nurse     Filed:  12/07/17 11:11 AM Encounter Date:  12/7/2017 Status:  Signed     :  Miladis Zapata RN (Registered Nurse)            Refilled x[AB1.1T] 6[AB1.1M] months.[AB1.1T]        Revision History        User Key Date/Time User Provider Type Action    > AB1.1 12/07/17 11:11 AM Miladis Zapata RN Registered Nurse Sign    M - Manual, T - Template
alert/confused

## 2020-06-22 NOTE — PROGRESS NOTE ADULT - PROBLEM SELECTOR PLAN 6
Transitions of Care Status:  1.  Name of PCP:  2.  PCP Contacted on Admission: [X  ] Y    [ ] N  Dr Albaro Ragsdale [phone]  3.  PCP contacted at Discharge: [ ] Y    [ ] N    [ ] N/A  4.  Post-Discharge Appointment Date and Location:  5.  Summary of Handoff given to PCP: DVT prophylaxis: HSQ q12h

## 2020-06-22 NOTE — PROGRESS NOTE ADULT - PROBLEM SELECTOR PLAN 2
- H/o HTN. Home medication includes hydrochlorothiaizde and losartan  - Hold thiazide in the setting of hyponatremia; will need to discontinue on discharge as well  - C/w amlodipine 2.5 mg daily, uptitrate as needed   - If sbp >180, will give hydralazine 5 mg IVP x1 and reassess  - VS q8h - H/o HTN. Home medication includes HCTZ and losartan  - Hold thiazide in the setting of hyponatremia; will need to discontinue on discharge as well  - C/w amlodipine 2.5 mg daily, uptitrate as needed   - If sbp >180, will give hydralazine 5 mg IVP x1 and reassess  - VS q8h

## 2020-06-22 NOTE — DISCHARGE NOTE PROVIDER - NSDCCPCAREPLAN_GEN_ALL_CORE_FT
PRINCIPAL DISCHARGE DIAGNOSIS  Diagnosis: Hyponatremia  Assessment and Plan of Treatment: PRINCIPAL DISCHARGE DIAGNOSIS  Diagnosis: Hyponatremia  Assessment and Plan of Treatment: You presented with a severely low sodium level in the blood. Your blood pressure medications (valsartan and hydrochlorothiazide) were stopped and switched to amlodipine. You were transiently on fluid restriction to allow your body to recover sodium levels slowly. Please follow up with Dr. Albaro Kohler within 1 week of discharge to repeat bloodwork.      SECONDARY DISCHARGE DIAGNOSES  Diagnosis: Hypertension  Assessment and Plan of Treatment: You presented with past medical history of high blood pressure. Your home blood pressure medications were discontinued in the setting of low blood sodium levels. A new medication (amlodipine) was started while you were in the hospital. Please take as prescribed. Please follow up with Dr. Albaro Kohler within 1 week of discharge for repeat bloodwork and readdressing blood pressure medication needs.    Diagnosis: Polymyalgia rheumatica  Assessment and Plan of Treatment: You presented with past medical history of polymyalgia rheumatica. You were continued on home medication (prednisone). Please follow up with Dr. Albaro Kohler within 1 week of discharge. PRINCIPAL DISCHARGE DIAGNOSIS  Diagnosis: Hyponatremia  Assessment and Plan of Treatment: You presented with a severely low sodium level in the blood. Your blood pressure medications (valsartan and hydrochlorothiazide) were stopped and switched to amlodipine. You were transiently on fluid restriction to allow your body to recover sodium levels slowly. Please follow up with Dr. Albaro Kohler within 1 week of discharge to repeat bloodwork.      SECONDARY DISCHARGE DIAGNOSES  Diagnosis: Leg wound, left  Assessment and Plan of Treatment: You presented with left leg wound associated with recent trauma prior to hospitalization. Wound care nurse was consulted. We recommend following wound care instruction:  "Sween 24 to bilateral upper and lower extremities daily.  Left lateral lower leg- Gently cleanse with NS. Leave Steri-strips in place, allow them to fall off on their own. Apply liquid barrier to periwound skin. Cover with Silicone foam with border. Change every three days.  Continue low air loss bed therapy, continue heel elevation with Z-flex fluidized positioning boots, continue to turn & reposition q2h with Z-laurita fluidized positioning device, soft pillow between bony prominences, continue use of single breathable pad, continue measures to decrease friction/shear/pressure."  Please follow up with your primary doctor within 1-2 weeks of discharge.      Diagnosis: Hypertension  Assessment and Plan of Treatment: You presented with past medical history of high blood pressure. Your home blood pressure medications were discontinued in the setting of low blood sodium levels. A new medication (amlodipine) was started while you were in the hospital. Please take as prescribed. Please follow up with Dr. Albaro Kohler within 1 week of discharge for repeat bloodwork and readdressing blood pressure medication needs.    Diagnosis: Polymyalgia rheumatica  Assessment and Plan of Treatment: You presented with past medical history of polymyalgia rheumatica. You were continued on home medication (prednisone). Please follow up with Dr. Albaro Kohler within 1 week of discharge.

## 2020-06-22 NOTE — PROGRESS NOTE ADULT - ATTENDING COMMENTS
Pt seen and examined at bedside by me.  Case d/w Dr. Maria and agree with findings and plan above with few additions.    Pt now more alert than yesterday.  No overnight events.  Hyponatremia improving.  Renal consult appreciated.  Hold IVF, hold loop diuretics, oral hydration encouraged.  Leukocytosis without fever, UA and CXR unremarkable.  No obvious source of infection at this point.  Monitoring off abx.

## 2020-06-22 NOTE — PHYSICAL THERAPY INITIAL EVALUATION ADULT - ADDITIONAL COMMENTS
Pt is a poor historian. As per pt she lives alone in an apartment with 1-2 steps to enter; (+)handrail; elevator inside. Prior to hospital admission pt reports that she was ambulating independently using a rolling walker. As per H and P report, pt had a fall ~3 weeks ago where he son then hired a [facility] home health aide.    Pt left comfortable in bed, NAD, all lines intact, all precautions maintained, with call bell in reach, bed alarm on, and RN aware of PT evaluation. Pt is a poor historian. As per pt she lives alone in an apartment with 1-2 steps to enter; (+)handrail; elevator inside. Prior to hospital admission pt reports that she was ambulating independently using a rolling walker. As per H and P report, pt had a fall ~3 weeks ago where her son then hired a [facility] home health aide.    Pt left comfortable in bed, NAD, all lines intact, all precautions maintained, with call bell in reach, bed alarm on, and RN aware of PT evaluation.

## 2020-06-22 NOTE — DISCHARGE NOTE PROVIDER - NSDCMRMEDTOKEN_GEN_ALL_CORE_FT
dipyridamole 25 mg oral tablet: 1 tab(s) orally 2 times a day  hydroCHLOROthiazide 50 mg oral tablet: 1 tab(s) orally once a day  meclizine 12.5 mg oral tablet: 1 tab(s) orally 2 times a day  predniSONE 5 mg oral tablet: 1 tab(s) orally 3 times a day  valsartan 160 mg oral tablet: 1 tab(s) orally once a day amLODIPine 5 mg oral tablet: 1 tab(s) orally once a day  dipyridamole 25 mg oral tablet: 1 tab(s) orally 2 times a day  meclizine 12.5 mg oral tablet: 1 tab(s) orally 2 times a day  predniSONE 5 mg oral tablet: 1 tab(s) orally 3 times a day amLODIPine 5 mg oral tablet: 1 tab(s) orally once a day  Artificial Tears ophthalmic solution: 1 drop(s) in each eye 4 times a day, As Needed   dipyridamole 25 mg oral tablet: 1 tab(s) orally 2 times a day  meclizine 12.5 mg oral tablet: 1 tab(s) orally 2 times a day  Metamucil 3.4 g/5.2 g oral powder for reconstitution: 12 gram(s) orally 2 times a day   predniSONE 5 mg oral tablet: 1 tab(s) orally 3 times a day  PreserVision AREDS 2 oral capsule: 1 cap(s) orally once a day

## 2020-06-22 NOTE — PROGRESS NOTE ADULT - PROBLEM SELECTOR PLAN 1
- P/w Na+ of 108, baseline is 144 (10/2019) likely w/ symptomatic chronic hypovolemic hyponatremia. Likely multifactorial 2/2 decreased solute intake, thiazide use, SIADH  - s/p 15 cc 3% HTS,  cc/hr x 3 hrs on the day of admission  - Do not over correct for >6-8 meq/24 hours. Goal for tomorrow evening is 114  - Sosm 235, Uosm 327, Emiliano 39, however values were taken after receiving IVF including hypertonic saline and NS. Will fluid restrict and hold diuretics.  - Regular diet with fluid restriction 1L.  - Nephrology following, recs appreciated.  - F/u CTH for AMS and to assess for possible mass that may cause SIADH - P/w Na+ of 108, baseline is 144 (10/2019) likely w/ symptomatic chronic hypovolemic hyponatremia. Likely multifactorial 2/2 decreased solute intake, thiazide use, SIADH  - s/p 15 cc 3% HTS,  cc/hr x 3 hrs on the day of admission  - Do not over correct for >6-8 meq/24 hours. Goal for tomorrow evening is 114  - Sosm 235, Uosm 327, Emiliano 39, however values were taken after receiving IVF including hypertonic saline and NS. Will fluid restrict and hold diuretics.  - Regular diet with fluid restriction 1L.  - Nephrology following, recs appreciated.  - CTH w/ no acute bleed or mass effect. - P/w Na+ of 108, baseline is 144 (10/2019) likely w/ symptomatic chronic hypovolemic hyponatremia. Likely multifactorial 2/2 decreased solute intake, thiazide use, SIADH  - s/p 15 cc 3% HTS,  cc/hr x 3 hrs on the day of admission  - Do not over correct for >6-8 meq/24 hours. Goal for tomorrow evening is 114  - Sosm 235, Uosm 327, Emiliano 39, however values were taken after receiving IVF including hypertonic saline and NS. Will fluid restrict and hold diuretics.  - Regular diet with fluid restriction 1L.  - Nephrology following, recs appreciated.  - CTH w/ no acute bleed or mass effect. CXR clear. - P/w Na+ of 108, baseline is 144 (10/2019) likely w/ symptomatic chronic hypovolemic hyponatremia. Likely multifactorial 2/2 decreased solute intake, thiazide use, SIADH  - s/p 15 cc 3% HTS,  cc/hr x 3 hrs on the day of admission  - Do not over correct for >6-8 meq/24 hours. Goal for tomorrow evening is 114  - Sosm 235, Uosm 327, Emiliano 39, however values were taken after receiving IVF including hypertonic saline and NS. Will hold diuretics.  - Nephrology following, recs appreciated. Per verbal recs, patient may drink to thirst. Discontinuing fluid restriction.  - CTH w/ no acute bleed or mass effect. CXR clear.

## 2020-06-22 NOTE — PHYSICAL THERAPY INITIAL EVALUATION ADULT - PATIENT PROFILE REVIEW, REHAB EVAL
No formal Activity Order in the computer; spoke with EMELY Brown prior to PT evaluation--> Pt OK for PT consult/yes

## 2020-06-22 NOTE — DISCHARGE NOTE PROVIDER - CARE PROVIDER_API CALL
Albaro Kohler  GASTROENTEROLOGY  [address] [id number]  Phone: [phone]  Fax: [phone]  Follow Up Time:

## 2020-06-22 NOTE — PHYSICAL THERAPY INITIAL EVALUATION ADULT - IMPAIRMENTS FOUND, PT EVAL
Concerning for arrhythmia causing transient decreased cerebral perfusion or TIA  EKG with NSR with PACs...   Will perform full neuro work up and monitor on tele      muscle strength/gait, locomotion, and balance

## 2020-06-22 NOTE — DISCHARGE NOTE PROVIDER - HOSPITAL COURSE
HPI:    Patient is a 89 y.o F w/ PMH HTN and polymyalgia rheumatica (on prednisone) who presents w/ progressive weakness and poor PO intake. She was AOx1 at encounter and did not answer questions appropriately. Son, Saurav Cooper, was called for collateral. Per son, patient was in her usual state of health until about three weeks ago she sustained a fall w/ laceration to her left leg. Since then, she had progressively become confused and lethargic. Since the fall, he hired a 24 hour aid who denied any N/V, fever/chills, or sick contacts. She had noticed that she had a poor appetite and would eat less.     Of note, in end of May, patient had presented to her PCP Dr. Edita Irvin for diarrhea for which she was prescribed imodium. Per documentation, there was a clear clinical deterioration since her last annual physical.     Per son, at baseline she is AOX3 and able to use an ipad to face time her grand children. She ambulated w/ a walker and was able to microwave pre-prepared food for herself. Her aid administers her medications.         Hospital Course:    In the ED, vitals Tmax 98.4 HR 85-88 /74 -192/80 RR 16-18 SPO2 12=912% on RA. Notable labs: Na+ 108. S/p tylenol x1, amlodipine 2.5 x1,  cc/hr for 5 hrs. Nephrology was consulted, which recommended NPO at this time and Q4H BMP monitoring off thiazide. Patient's sodium was corrected over time                PT was consulted, which recommended discharge to rehab.                Patient was deemed medically stable for discharge and was sent _____________. HPI:    Patient is a 89 y.o F w/ PMH HTN and polymyalgia rheumatica (on prednisone) who presents w/ progressive weakness and poor PO intake. She was AOx1 at encounter and did not answer questions appropriately. Son, Saurav Cooper, was called for collateral. Per son, patient was in her usual state of health until about three weeks ago she sustained a fall w/ laceration to her left leg. Since then, she had progressively become confused and lethargic. Since the fall, he hired a 24 hour aid who denied any N/V, fever/chills, or sick contacts. She had noticed that she had a poor appetite and would eat less.     Of note, in end of May, patient had presented to her PCP Dr. Edita Irvin for diarrhea for which she was prescribed imodium. Per documentation, there was a clear clinical deterioration since her last annual physical.     Per son, at baseline she is AOX3 and able to use an ipad to face time her grand children. She ambulated w/ a walker and was able to microwave pre-prepared food for herself. Her aid administers her medications.         Hospital Course:    In the ED, vitals Tmax 98.4 HR 85-88 /74 -192/80 RR 16-18 SPO2 05=163% on RA. Notable labs: Na+ 108. S/p tylenol x1, amlodipine 2.5 x1,  cc/hr for 5 hrs. Nephrology was consulted, which recommended NPO at this time and Q4H BMP monitoring off thiazide. Patient's sodium was corrected slowly over time. Patient's mental status improved during her hospitalization without focal neurological deficit. PT was consulted, which recommended discharge to rehab, however patient and family opted for discharge home. Patient has private hire of [employer] to assist with ADLs and IADLs. Patient was deemed medically stable for discharge and was sent home. Patient is to DISCONTINUE both hydrochlorothiazide and valsartan. Patient is to follow up with Dr. Albaro Kohler within 1 week of discharge to reassess BP and serum electrolytes. HPI:    Patient is a 89 y.o F w/ PMH HTN and polymyalgia rheumatica (on prednisone) who presents w/ progressive weakness and poor PO intake. She was AOx1 at encounter and did not answer questions appropriately. Son, Saurav Cooper, was called for collateral. Per son, patient was in her usual state of health until about three weeks ago she sustained a fall w/ laceration to her left leg. Since then, she had progressively become confused and lethargic. Since the fall, he hired a 24 hour aid who denied any N/V, fever/chills, or sick contacts. She had noticed that she had a poor appetite and would eat less.     Of note, in end of May, patient had presented to her PCP Dr. Edita Irvin for diarrhea for which she was prescribed imodium. Per documentation, there was a clear clinical deterioration since her last annual physical.     Per son, at baseline she is AOX3 and able to use an ipad to face time her grand children. She ambulated w/ a walker and was able to microwave pre-prepared food for herself. Her aid administers her medications.         Hospital Course:    In the ED, vitals Tmax 98.4 HR 85-88 /74 -192/80 RR 16-18 SPO2 74=229% on RA. Notable labs: Na+ 108. S/p tylenol x1, amlodipine 2.5 x1,  cc/hr for 5 hrs. Nephrology was consulted, which recommended NPO at this time and Q4H BMP monitoring off thiazide. Patient's sodium was corrected slowly over time. Patient's mental status improved during her hospitalization without focal neurological deficit. PT was consulted, which recommended discharge to rehab. Patient was deemed medically stable for discharge and was sent to rehab. Patient is to DISCONTINUE both hydrochlorothiazide and valsartan. Patient is to follow up with Dr. Albaro Kohler within 1 week of discharge to reassess BP and serum electrolytes.

## 2020-06-22 NOTE — PROVIDER CONTACT NOTE (OTHER) - ASSESSMENT
Pt. is resting comfortably with no s/s of acute distress noted.  Pt. denies headache, dizziness, & lightheadedness.

## 2020-06-22 NOTE — DISCHARGE NOTE PROVIDER - NSDCHHNEEDSERVICE_GEN_ALL_CORE
Observation and assessment/Rehabilitation services Observation and assessment/Rehabilitation services/Wound care and assessment

## 2020-06-23 LAB
ANION GAP SERPL CALC-SCNC: 13 MMO/L — SIGNIFICANT CHANGE UP (ref 7–14)
ANION GAP SERPL CALC-SCNC: 13 MMO/L — SIGNIFICANT CHANGE UP (ref 7–14)
ANION GAP SERPL CALC-SCNC: 14 MMO/L — SIGNIFICANT CHANGE UP (ref 7–14)
BASOPHILS # BLD AUTO: 0.02 K/UL — SIGNIFICANT CHANGE UP (ref 0–0.2)
BASOPHILS NFR BLD AUTO: 0.1 % — SIGNIFICANT CHANGE UP (ref 0–2)
BUN SERPL-MCNC: 18 MG/DL — SIGNIFICANT CHANGE UP (ref 7–23)
BUN SERPL-MCNC: 21 MG/DL — SIGNIFICANT CHANGE UP (ref 7–23)
BUN SERPL-MCNC: 23 MG/DL — SIGNIFICANT CHANGE UP (ref 7–23)
CALCIUM SERPL-MCNC: 8.8 MG/DL — SIGNIFICANT CHANGE UP (ref 8.4–10.5)
CALCIUM SERPL-MCNC: 8.9 MG/DL — SIGNIFICANT CHANGE UP (ref 8.4–10.5)
CALCIUM SERPL-MCNC: 9.4 MG/DL — SIGNIFICANT CHANGE UP (ref 8.4–10.5)
CHLORIDE SERPL-SCNC: 85 MMOL/L — LOW (ref 98–107)
CHLORIDE SERPL-SCNC: 85 MMOL/L — LOW (ref 98–107)
CHLORIDE SERPL-SCNC: 86 MMOL/L — LOW (ref 98–107)
CO2 SERPL-SCNC: 21 MMOL/L — LOW (ref 22–31)
CO2 SERPL-SCNC: 21 MMOL/L — LOW (ref 22–31)
CO2 SERPL-SCNC: 22 MMOL/L — SIGNIFICANT CHANGE UP (ref 22–31)
CREAT SERPL-MCNC: 1.07 MG/DL — SIGNIFICANT CHANGE UP (ref 0.5–1.3)
CREAT SERPL-MCNC: 1.09 MG/DL — SIGNIFICANT CHANGE UP (ref 0.5–1.3)
CREAT SERPL-MCNC: 1.15 MG/DL — SIGNIFICANT CHANGE UP (ref 0.5–1.3)
EOSINOPHIL # BLD AUTO: 0 K/UL — SIGNIFICANT CHANGE UP (ref 0–0.5)
EOSINOPHIL NFR BLD AUTO: 0 % — SIGNIFICANT CHANGE UP (ref 0–6)
GLUCOSE SERPL-MCNC: 117 MG/DL — HIGH (ref 70–99)
GLUCOSE SERPL-MCNC: 127 MG/DL — HIGH (ref 70–99)
GLUCOSE SERPL-MCNC: 136 MG/DL — HIGH (ref 70–99)
HCT VFR BLD CALC: 29.3 % — LOW (ref 34.5–45)
HGB BLD-MCNC: 10.5 G/DL — LOW (ref 11.5–15.5)
IMM GRANULOCYTES NFR BLD AUTO: 1 % — SIGNIFICANT CHANGE UP (ref 0–1.5)
LYMPHOCYTES # BLD AUTO: 1.07 K/UL — SIGNIFICANT CHANGE UP (ref 1–3.3)
LYMPHOCYTES # BLD AUTO: 6.1 % — LOW (ref 13–44)
MAGNESIUM SERPL-MCNC: 1.7 MG/DL — SIGNIFICANT CHANGE UP (ref 1.6–2.6)
MAGNESIUM SERPL-MCNC: 1.7 MG/DL — SIGNIFICANT CHANGE UP (ref 1.6–2.6)
MAGNESIUM SERPL-MCNC: 1.9 MG/DL — SIGNIFICANT CHANGE UP (ref 1.6–2.6)
MCHC RBC-ENTMCNC: 30.3 PG — SIGNIFICANT CHANGE UP (ref 27–34)
MCHC RBC-ENTMCNC: 35.8 % — SIGNIFICANT CHANGE UP (ref 32–36)
MCV RBC AUTO: 84.4 FL — SIGNIFICANT CHANGE UP (ref 80–100)
MONOCYTES # BLD AUTO: 1.07 K/UL — HIGH (ref 0–0.9)
MONOCYTES NFR BLD AUTO: 6.1 % — SIGNIFICANT CHANGE UP (ref 2–14)
NEUTROPHILS # BLD AUTO: 15.31 K/UL — HIGH (ref 1.8–7.4)
NEUTROPHILS NFR BLD AUTO: 86.7 % — HIGH (ref 43–77)
NRBC # FLD: 0 K/UL — SIGNIFICANT CHANGE UP (ref 0–0)
PHOSPHATE SERPL-MCNC: 2.3 MG/DL — LOW (ref 2.5–4.5)
PHOSPHATE SERPL-MCNC: 3 MG/DL — SIGNIFICANT CHANGE UP (ref 2.5–4.5)
PHOSPHATE SERPL-MCNC: 4.2 MG/DL — SIGNIFICANT CHANGE UP (ref 2.5–4.5)
PLATELET # BLD AUTO: 449 K/UL — HIGH (ref 150–400)
PMV BLD: 9.6 FL — SIGNIFICANT CHANGE UP (ref 7–13)
POTASSIUM SERPL-MCNC: 3.9 MMOL/L — SIGNIFICANT CHANGE UP (ref 3.5–5.3)
POTASSIUM SERPL-MCNC: 4 MMOL/L — SIGNIFICANT CHANGE UP (ref 3.5–5.3)
POTASSIUM SERPL-MCNC: 4.3 MMOL/L — SIGNIFICANT CHANGE UP (ref 3.5–5.3)
POTASSIUM SERPL-SCNC: 3.9 MMOL/L — SIGNIFICANT CHANGE UP (ref 3.5–5.3)
POTASSIUM SERPL-SCNC: 4 MMOL/L — SIGNIFICANT CHANGE UP (ref 3.5–5.3)
POTASSIUM SERPL-SCNC: 4.3 MMOL/L — SIGNIFICANT CHANGE UP (ref 3.5–5.3)
RBC # BLD: 3.47 M/UL — LOW (ref 3.8–5.2)
RBC # FLD: 12.6 % — SIGNIFICANT CHANGE UP (ref 10.3–14.5)
SODIUM SERPL-SCNC: 119 MMOL/L — CRITICAL LOW (ref 135–145)
SODIUM SERPL-SCNC: 120 MMOL/L — CRITICAL LOW (ref 135–145)
SODIUM SERPL-SCNC: 120 MMOL/L — CRITICAL LOW (ref 135–145)
WBC # BLD: 17.65 K/UL — HIGH (ref 3.8–10.5)
WBC # FLD AUTO: 17.65 K/UL — HIGH (ref 3.8–10.5)

## 2020-06-23 PROCEDURE — 99232 SBSQ HOSP IP/OBS MODERATE 35: CPT | Mod: GC

## 2020-06-23 PROCEDURE — 99233 SBSQ HOSP IP/OBS HIGH 50: CPT

## 2020-06-23 RX ORDER — ENOXAPARIN SODIUM 100 MG/ML
40 INJECTION SUBCUTANEOUS DAILY
Refills: 0 | Status: DISCONTINUED | OUTPATIENT
Start: 2020-06-23 | End: 2020-06-23

## 2020-06-23 RX ORDER — ENOXAPARIN SODIUM 100 MG/ML
30 INJECTION SUBCUTANEOUS DAILY
Refills: 0 | Status: DISCONTINUED | OUTPATIENT
Start: 2020-06-23 | End: 2020-06-26

## 2020-06-23 RX ORDER — LANOLIN ALCOHOL/MO/W.PET/CERES
6 CREAM (GRAM) TOPICAL AT BEDTIME
Refills: 0 | Status: DISCONTINUED | OUTPATIENT
Start: 2020-06-23 | End: 2020-06-26

## 2020-06-23 RX ORDER — SODIUM,POTASSIUM PHOSPHATES 278-250MG
1 POWDER IN PACKET (EA) ORAL ONCE
Refills: 0 | Status: COMPLETED | OUTPATIENT
Start: 2020-06-23 | End: 2020-06-23

## 2020-06-23 RX ADMIN — Medication 1 DROP(S): at 17:48

## 2020-06-23 RX ADMIN — Medication 63.75 MILLIMOLE(S): at 10:10

## 2020-06-23 RX ADMIN — Medication 5 MILLIGRAM(S): at 21:25

## 2020-06-23 RX ADMIN — Medication 1 DROP(S): at 23:27

## 2020-06-23 RX ADMIN — Medication 5 MILLIGRAM(S): at 13:06

## 2020-06-23 RX ADMIN — Medication 1 PACKET(S): at 07:57

## 2020-06-23 RX ADMIN — ENOXAPARIN SODIUM 30 MILLIGRAM(S): 100 INJECTION SUBCUTANEOUS at 12:40

## 2020-06-23 RX ADMIN — Medication 5 MILLIGRAM(S): at 07:03

## 2020-06-23 RX ADMIN — Medication 6 MILLIGRAM(S): at 23:27

## 2020-06-23 RX ADMIN — AMLODIPINE BESYLATE 2.5 MILLIGRAM(S): 2.5 TABLET ORAL at 07:03

## 2020-06-23 NOTE — PROVIDER CONTACT NOTE (CRITICAL VALUE NOTIFICATION) - SITUATION
Pt with low sodium Level
Pt with low sodium Level
Patient is a 89 year old female came in due to hyponatremia and metabolic encephalopathy.
Patient is a 89 year old female came in due to hyponatremia, and poor salute intake with possible SIADH.
Patient is a 89 year old female came in with hyponatremia and metabolic encephalopathy.
Pt admitted for AMS, hx of HTN.
Pt. found to have sodium of 113.

## 2020-06-23 NOTE — PROGRESS NOTE ADULT - PROBLEM SELECTOR PLAN 2
- H/o HTN. Home medication includes HCTZ and losartan  - Hold thiazide in the setting of hyponatremia; will need to discontinue on discharge as well  - C/w amlodipine 2.5 mg daily, uptitrate as needed   - If sbp >180, will give hydralazine 5 mg IVP x1 and reassess  - VS q8h

## 2020-06-23 NOTE — PROGRESS NOTE ADULT - ATTENDING COMMENTS
Hyponatremia    Will cont to monitor for now, should correct on her own. Cont to encourage po food intake

## 2020-06-23 NOTE — PROVIDER CONTACT NOTE (CRITICAL VALUE NOTIFICATION) - RECOMMENDATIONS
Md made aware.
awaiting order
Continue to monitor for s/s of acute distress, monitor electrolytes as ordered.
Continued monitoring of pt.
DR. Pastor was notified, and waiting for recommendations.
Waiting for recommendations by notified MD.
Waiting recommendations from notified MD.

## 2020-06-23 NOTE — PROGRESS NOTE ADULT - SUBJECTIVE AND OBJECTIVE BOX
[facility] DIVISION OF KIDNEY DISEASES AND HYPERTENSION -- FOLLOW UP NOTE  --------------------------------------------------------------------------------  HPI: 89-year-old female, with HTN presented to [facility] ER due to increasing weakness, admitted for hyponatremia. History obtained from pt's son and ED team. As per son, patient was in her usual health about 1 month ago. Pt. had fall about 1 month ago when she got out of bed and has been deteriorating since then. Son provided pt. with aid and says that his mother was skipping meals and had decreased appetite in the last month. Denies any previous history of hyponatremia. Of note, pt. taking HCTZ 50 mg for BP control. Nephrology team consulted for hyponatremia. Upon review of labs on [facility], sNa was 144 on 10/2/19. On arrival, sNa was 108, has improved appropriately to 119 today.    Pt. evalauted at bedside, improved mental status. Diet liberalized. Pt. offers no complaints.    PAST HISTORY  --------------------------------------------------------------------------------  No significant changes to PMH, PSH, FHx, SHx, unless otherwise noted    ALLERGIES & MEDICATIONS  --------------------------------------------------------------------------------  Allergies    No Known Allergies    Intolerances    Standing Inpatient Medications  amLODIPine   Tablet 2.5 milliGRAM(s) Oral daily  enoxaparin Injectable 30 milliGRAM(s) SubCutaneous daily  predniSONE   Tablet 5 milliGRAM(s) Oral three times a day  sodium phosphate IVPB 15 milliMole(s) IV Intermittent once    REVIEW OF SYSTEMS  --------------------------------------------------------------------------------  Gen: no lethargy  Respiratory: No dyspnea  CV: No chest pain  GI: + decrease appetite   MSK: no LE edema  Neuro: No dizziness  Heme: multiple bruises    All other systems were reviewed and are negative, except as noted.    VITALS/PHYSICAL EXAM  --------------------------------------------------------------------------------  T(C): 36.7 (06-23-20 @ 06:37), Max: 36.9 (06-22-20 @ 21:56)  HR: 92 (06-23-20 @ 06:37) (91 - 96)  BP: 151/64 (06-23-20 @ 06:37) (119/55 - 151/64)  RR: 16 (06-23-20 @ 06:37) (16 - 18)  SpO2: 97% (06-23-20 @ 06:37) (97% - 98%)  Wt(kg): --  Height (cm): 152.4 (06-21-20 @ 19:00)  Weight (kg): 55.9 (06-21-20 @ 19:00)  BMI (kg/m2): 24.1 (06-21-20 @ 19:00)  BSA (m2): 1.52 (06-21-20 @ 19:00)    06-22-20 @ 07:01  -  06-23-20 @ 07:00  --------------------------------------------------------  IN: 0 mL / OUT: 591 mL / NET: -591 mL    Physical Exam:  	Gen: NAD  	HEENT: MMM  	Pulm: CTA B/L  	CV: S1S2  	Abd: Soft, +BS   	Ext: No LE edema B/L  	Neuro: Awake  	Skin: multiple bruises     LABS/STUDIES  --------------------------------------------------------------------------------              10.5   17.65 >-----------<  449      [06-23-20 @ 05:15]              29.3     119  |  85  |  18  ----------------------------<  127      [06-23-20 @ 05:15]  3.9   |  21  |  1.09        Ca     9.4     [06-23-20 @ 05:15]      Mg     1.9     [06-23-20 @ 05:15]      Phos  2.3     [06-23-20 @ 05:15]    Creatinine Trend:  SCr 1.09 [06-23 @ 05:15]  SCr 1.08 [06-22 @ 23:10]  SCr 1.18 [06-22 @ 15:32]  SCr 1.01 [06-22 @ 10:24]  SCr 1.08 [06-22 @ 05:36]

## 2020-06-23 NOTE — PROGRESS NOTE ADULT - PROBLEM SELECTOR PLAN 1
- P/w Na+ of 108, baseline is 144 (10/2019) likely w/ symptomatic chronic hypovolemic hyponatremia. Likely multifactorial 2/2 decreased solute intake, thiazide use, SIADH  - s/p 15 cc 3% HTS,  cc/hr x 3 hrs on the day of admission  - Do not over correct for >6-8 meq/24 hours.  - Sosm 235, Uosm 327, Emiliano 39 on admission, however values were taken after receiving IVF including hypertonic saline and NS. Will hold diuretics.  - Nephrology following, recs appreciated. Per verbal recs, patient may drink to thirst. Discontinuing fluid restriction.  - Imaging study to assess for possible malignancy. CTH w/ no acute bleed or mass effect. CXR clear.  - Currently slowly improving.

## 2020-06-23 NOTE — PROVIDER CONTACT NOTE (CRITICAL VALUE NOTIFICATION) - ASSESSMENT
pt asymptomatic
pt asymptomatic
Patients BMP showed a sodium level of 120.
Patients Sodium is 119.
Patients sodium level is 118.
Pt sitting in bed comfortably, denies any acute distress. Pt's bloodwork drawn earlier, shows Na level of 120.
Pt. is resting comfortably with no s/s of acute distress noted.

## 2020-06-23 NOTE — PROVIDER CONTACT NOTE (CRITICAL VALUE NOTIFICATION) - BACKGROUND
dx: hyponatremia
dx: hyponatremia
Patient is a 89 year old female came in due to hyponatremia and metabolic encephalopathy.
Patient is a 89 year old female came in due to hyponatremia, and poor salute intake with possible SIADH
Patient is a 89 year old female that came in with hyponatremia and metabolic encephalopathy and is a status post fall.
Pt admitted for AMS, hx of HTN.
pt. admitted for hypovolemic hyponatremia.

## 2020-06-23 NOTE — PROGRESS NOTE ADULT - PROBLEM SELECTOR PLAN 1
Pt. with hypo-osmolar hyponatremia in the setting of thiazide diuretic and decreased PO intake. No previous history of hyponatremia. sNa on arrival was 108. Pt. given 15 cc of 3% initially but then started on fluid restriction. sNa has improved appropriately to 119 today. Recommend to allow pt. to drink to thirst. Liberalize diet. Discontinue Dumont catheter. Monitor sNa q 6-8 hours. Goal sNa tomorrow AM is 125-127.     If any questions, please feel free to contact me  Justin Nunez   Nephrology Fellow  [phone]  (After 5 pm or on weekends please page the on-call fellow)

## 2020-06-23 NOTE — PROVIDER CONTACT NOTE (CRITICAL VALUE NOTIFICATION) - ACTION/TREATMENT ORDERED:
awaiting order
As per MD, pt to be continued on BMP q6h. No further intervention or assessment required by RN.
Continue to monitor for s/s of acute distress.  Next blood draw with morning labs.  No further interventions at this time.
Waiting for recommendations by notified MD.
Waiting for recommendations from notified MD.
Waiting recommendations from notified MD.

## 2020-06-23 NOTE — PROGRESS NOTE ADULT - ASSESSMENT
89 y.o F w/ PMH HTN and polymyalgia rheumatica (on prednisone) who presents w/ metabolic encephalpoathy in the setting of chronic hypovolemic hyponatremia likely multifactorial-thiazide use, poor solute intake, possible SIADH

## 2020-06-23 NOTE — PROGRESS NOTE ADULT - SUBJECTIVE AND OBJECTIVE BOX
*************************************  Rene Maria M.D., Ph.D. | PGY-2  Department of Internal Medicine  [phone] ([facility]) | [id number] ([facility])  *************************************      Patient is a 89y old  Female who presents with a chief complaint of weakness (2020 06:42)      SUBJECTIVE / OVERNIGHT EVENTS:  1 BM last evening per nursing. SNa uptrended to 119 overnight. Fluid restriction lifted per nephrology. Tolerated diet. Patient was seen and examined at bedside. Nephrology fellow present. No acute overnight event reported by patient. Denies fever, chills, nausea, vomiting, chest pain, SOB, dizziness, or coughs.    MEDICATIONS  (STANDING):  amLODIPine   Tablet 2.5 milliGRAM(s) Oral daily  enoxaparin Injectable 30 milliGRAM(s) SubCutaneous daily  predniSONE   Tablet 5 milliGRAM(s) Oral three times a day  sodium phosphate IVPB 15 milliMole(s) IV Intermittent once    MEDICATIONS  (PRN):      CAPILLARY BLOOD GLUCOSE        I&O's Summary    2020 07:01  -  2020 07:00  --------------------------------------------------------  IN: 0 mL / OUT: 591 mL / NET: -591 mL        Vital Signs Last 24 Hrs  T(C): 36.7 (2020 06:37), Max: 36.9 (2020 21:56)  T(F): 98 (2020 06:37), Max: 98.4 (2020 21:56)  HR: 92 (2020 06:37) (91 - 96)  BP: 151/64 (2020 06:37) (119/55 - 151/64)  BP(mean): --  RR: 16 (2020 06:37) (16 - 18)  SpO2: 97% (2020 06:37) (97% - 98%)    PHYSICAL EXAM:  GENERAL: NAD, awake, alert, following commands  HEAD: Atraumatic, Normocephalic  EYES: EOMI, PERRL, conjunctiva and sclera clear  NECK: Supple  CHEST/LUNG: Clear to auscultation bilaterally; No wheezes or crackles  HEART: Normal S1/S2; Regular rate and rhythm; +systolic murmurs  ABDOMEN: Soft, Nontender, Nondistended  EXTREMITIES: 1+ Peripheral Pulses; No clubbing, cyanosis, or edema  PSYCH: A&Ox2 (self, location)  NEUROLOGY: no focal neurologic deficit in CN II-XII  SKIN: patchy ecchymoses BLE; lateral LLE with mild tenderness    LABS:                        10.5   17.65 )-----------( 449      ( 2020 05:15 )             29.3      06-23    119<LL>  |  85<L>  |  18  ----------------------------<  127<H>  3.9   |  21<L>  |  1.09    Ca    9.4      2020 05:15  Phos  2.3     06-23  Mg     1.9     06-23    TPro  6.0  /  Alb  3.5  /  TBili  0.5  /  DBili  x   /  AST  35<H>  /  ALT  18  /  AlkPhos  58  06-21          Urinalysis Basic - ( 2020 16:15 )    Color: LIGHT YELLOW / Appearance: CLEAR / S.013 / pH: 7.5  Gluc: NEGATIVE / Ketone: NEGATIVE  / Bili: NEGATIVE / Urobili: NORMAL   Blood: NEGATIVE / Protein: 20 / Nitrite: NEGATIVE   Leuk Esterase: NEGATIVE / RBC: 3-5 / WBC 0-2   Sq Epi: FEW / Non Sq Epi: x / Bacteria: NEGATIVE        RADIOLOGY & ADDITIONAL TESTS:    Imaging Personally Reviewed:    Consultant(s) Notes Reviewed:  Yes    Care Discussed with Consultants/Other Providers:

## 2020-06-23 NOTE — PROGRESS NOTE ADULT - ATTENDING COMMENTS
Pt seen and examined at bedside this morning. Case d/w Dr. Maria and agree with findings and plan above with few additions.    Mentation with continued improvement - more alert, AAOx2.  No overnight events.  Hyponatremia improving, Na 119 now.  Renal f/u appreciated.  Hold IVF, hold loop diuretics, oral hydration encouraged.  Leukocytosis without any obvious source of infection at this point.  Remains afebrile; UA, Ucx, CXR unremarkable.  PT for d/c planning.

## 2020-06-24 LAB
ALBUMIN SERPL ELPH-MCNC: 3.2 G/DL — LOW (ref 3.3–5)
ALP SERPL-CCNC: 53 U/L — SIGNIFICANT CHANGE UP (ref 40–120)
ALT FLD-CCNC: 17 U/L — SIGNIFICANT CHANGE UP (ref 4–33)
ANION GAP SERPL CALC-SCNC: 12 MMO/L — SIGNIFICANT CHANGE UP (ref 7–14)
ANION GAP SERPL CALC-SCNC: 9 MMO/L — SIGNIFICANT CHANGE UP (ref 7–14)
AST SERPL-CCNC: 23 U/L — SIGNIFICANT CHANGE UP (ref 4–32)
BASOPHILS # BLD AUTO: 0.02 K/UL — SIGNIFICANT CHANGE UP (ref 0–0.2)
BASOPHILS NFR BLD AUTO: 0.1 % — SIGNIFICANT CHANGE UP (ref 0–2)
BILIRUB SERPL-MCNC: 0.3 MG/DL — SIGNIFICANT CHANGE UP (ref 0.2–1.2)
BUN SERPL-MCNC: 23 MG/DL — SIGNIFICANT CHANGE UP (ref 7–23)
BUN SERPL-MCNC: 23 MG/DL — SIGNIFICANT CHANGE UP (ref 7–23)
BUN SERPL-MCNC: 25 MG/DL — HIGH (ref 7–23)
BUN SERPL-MCNC: 31 MG/DL — HIGH (ref 7–23)
CALCIUM SERPL-MCNC: 9.2 MG/DL — SIGNIFICANT CHANGE UP (ref 8.4–10.5)
CALCIUM SERPL-MCNC: 9.2 MG/DL — SIGNIFICANT CHANGE UP (ref 8.4–10.5)
CALCIUM SERPL-MCNC: 9.5 MG/DL — SIGNIFICANT CHANGE UP (ref 8.4–10.5)
CALCIUM SERPL-MCNC: 9.9 MG/DL — SIGNIFICANT CHANGE UP (ref 8.4–10.5)
CHLORIDE SERPL-SCNC: 87 MMOL/L — LOW (ref 98–107)
CHLORIDE SERPL-SCNC: 90 MMOL/L — LOW (ref 98–107)
CO2 SERPL-SCNC: 23 MMOL/L — SIGNIFICANT CHANGE UP (ref 22–31)
CO2 SERPL-SCNC: 23 MMOL/L — SIGNIFICANT CHANGE UP (ref 22–31)
CO2 SERPL-SCNC: 25 MMOL/L — SIGNIFICANT CHANGE UP (ref 22–31)
CO2 SERPL-SCNC: 26 MMOL/L — SIGNIFICANT CHANGE UP (ref 22–31)
CREAT SERPL-MCNC: 1.03 MG/DL — SIGNIFICANT CHANGE UP (ref 0.5–1.3)
CREAT SERPL-MCNC: 1.09 MG/DL — SIGNIFICANT CHANGE UP (ref 0.5–1.3)
CREAT SERPL-MCNC: 1.09 MG/DL — SIGNIFICANT CHANGE UP (ref 0.5–1.3)
CREAT SERPL-MCNC: 1.27 MG/DL — SIGNIFICANT CHANGE UP (ref 0.5–1.3)
EOSINOPHIL # BLD AUTO: 0.03 K/UL — SIGNIFICANT CHANGE UP (ref 0–0.5)
EOSINOPHIL NFR BLD AUTO: 0.2 % — SIGNIFICANT CHANGE UP (ref 0–6)
GLUCOSE SERPL-MCNC: 117 MG/DL — HIGH (ref 70–99)
GLUCOSE SERPL-MCNC: 117 MG/DL — HIGH (ref 70–99)
GLUCOSE SERPL-MCNC: 121 MG/DL — HIGH (ref 70–99)
GLUCOSE SERPL-MCNC: 123 MG/DL — HIGH (ref 70–99)
HCT VFR BLD CALC: 28.7 % — LOW (ref 34.5–45)
HGB BLD-MCNC: 9.9 G/DL — LOW (ref 11.5–15.5)
IMM GRANULOCYTES NFR BLD AUTO: 1 % — SIGNIFICANT CHANGE UP (ref 0–1.5)
LYMPHOCYTES # BLD AUTO: 1.09 K/UL — SIGNIFICANT CHANGE UP (ref 1–3.3)
LYMPHOCYTES # BLD AUTO: 6.3 % — LOW (ref 13–44)
MAGNESIUM SERPL-MCNC: 1.8 MG/DL — SIGNIFICANT CHANGE UP (ref 1.6–2.6)
MAGNESIUM SERPL-MCNC: 1.8 MG/DL — SIGNIFICANT CHANGE UP (ref 1.6–2.6)
MCHC RBC-ENTMCNC: 29.3 PG — SIGNIFICANT CHANGE UP (ref 27–34)
MCHC RBC-ENTMCNC: 34.5 % — SIGNIFICANT CHANGE UP (ref 32–36)
MCV RBC AUTO: 84.9 FL — SIGNIFICANT CHANGE UP (ref 80–100)
MONOCYTES # BLD AUTO: 1.12 K/UL — HIGH (ref 0–0.9)
MONOCYTES NFR BLD AUTO: 6.5 % — SIGNIFICANT CHANGE UP (ref 2–14)
NEUTROPHILS # BLD AUTO: 14.75 K/UL — HIGH (ref 1.8–7.4)
NEUTROPHILS NFR BLD AUTO: 85.9 % — HIGH (ref 43–77)
NRBC # FLD: 0 K/UL — SIGNIFICANT CHANGE UP (ref 0–0)
PHOSPHATE SERPL-MCNC: 2.4 MG/DL — LOW (ref 2.5–4.5)
PHOSPHATE SERPL-MCNC: 2.5 MG/DL — SIGNIFICANT CHANGE UP (ref 2.5–4.5)
PLATELET # BLD AUTO: 419 K/UL — HIGH (ref 150–400)
PMV BLD: 9.2 FL — SIGNIFICANT CHANGE UP (ref 7–13)
POTASSIUM SERPL-MCNC: 4.2 MMOL/L — SIGNIFICANT CHANGE UP (ref 3.5–5.3)
POTASSIUM SERPL-MCNC: 4.2 MMOL/L — SIGNIFICANT CHANGE UP (ref 3.5–5.3)
POTASSIUM SERPL-MCNC: 4.3 MMOL/L — SIGNIFICANT CHANGE UP (ref 3.5–5.3)
POTASSIUM SERPL-MCNC: 4.9 MMOL/L — SIGNIFICANT CHANGE UP (ref 3.5–5.3)
POTASSIUM SERPL-SCNC: 4.2 MMOL/L — SIGNIFICANT CHANGE UP (ref 3.5–5.3)
POTASSIUM SERPL-SCNC: 4.2 MMOL/L — SIGNIFICANT CHANGE UP (ref 3.5–5.3)
POTASSIUM SERPL-SCNC: 4.3 MMOL/L — SIGNIFICANT CHANGE UP (ref 3.5–5.3)
POTASSIUM SERPL-SCNC: 4.9 MMOL/L — SIGNIFICANT CHANGE UP (ref 3.5–5.3)
PROT SERPL-MCNC: 5.6 G/DL — LOW (ref 6–8.3)
RBC # BLD: 3.38 M/UL — LOW (ref 3.8–5.2)
RBC # FLD: 12.6 % — SIGNIFICANT CHANGE UP (ref 10.3–14.5)
SODIUM SERPL-SCNC: 122 MMOL/L — LOW (ref 135–145)
SODIUM SERPL-SCNC: 122 MMOL/L — LOW (ref 135–145)
SODIUM SERPL-SCNC: 124 MMOL/L — LOW (ref 135–145)
SODIUM SERPL-SCNC: 125 MMOL/L — LOW (ref 135–145)
WBC # BLD: 17.18 K/UL — HIGH (ref 3.8–10.5)
WBC # FLD AUTO: 17.18 K/UL — HIGH (ref 3.8–10.5)

## 2020-06-24 PROCEDURE — 99233 SBSQ HOSP IP/OBS HIGH 50: CPT

## 2020-06-24 PROCEDURE — 99232 SBSQ HOSP IP/OBS MODERATE 35: CPT | Mod: GC

## 2020-06-24 RX ORDER — AMLODIPINE BESYLATE 2.5 MG/1
2.5 TABLET ORAL ONCE
Refills: 0 | Status: COMPLETED | OUTPATIENT
Start: 2020-06-24 | End: 2020-06-24

## 2020-06-24 RX ORDER — SODIUM,POTASSIUM PHOSPHATES 278-250MG
1 POWDER IN PACKET (EA) ORAL ONCE
Refills: 0 | Status: COMPLETED | OUTPATIENT
Start: 2020-06-24 | End: 2020-06-24

## 2020-06-24 RX ORDER — VALSARTAN 80 MG/1
1 TABLET ORAL
Qty: 0 | Refills: 0 | DISCHARGE

## 2020-06-24 RX ORDER — AMLODIPINE BESYLATE 2.5 MG/1
2.5 TABLET ORAL ONCE
Refills: 0 | Status: DISCONTINUED | OUTPATIENT
Start: 2020-06-24 | End: 2020-06-24

## 2020-06-24 RX ORDER — AMLODIPINE BESYLATE 2.5 MG/1
5 TABLET ORAL DAILY
Refills: 0 | Status: DISCONTINUED | OUTPATIENT
Start: 2020-06-24 | End: 2020-06-26

## 2020-06-24 RX ORDER — AMLODIPINE BESYLATE 2.5 MG/1
1 TABLET ORAL
Qty: 30 | Refills: 0
Start: 2020-06-24 | End: 2020-07-23

## 2020-06-24 RX ADMIN — Medication 1 DROP(S): at 17:52

## 2020-06-24 RX ADMIN — Medication 1 DROP(S): at 13:53

## 2020-06-24 RX ADMIN — AMLODIPINE BESYLATE 2.5 MILLIGRAM(S): 2.5 TABLET ORAL at 05:21

## 2020-06-24 RX ADMIN — Medication 5 MILLIGRAM(S): at 05:21

## 2020-06-24 RX ADMIN — Medication 6 MILLIGRAM(S): at 22:12

## 2020-06-24 RX ADMIN — Medication 1 DROP(S): at 05:21

## 2020-06-24 RX ADMIN — Medication 5 MILLIGRAM(S): at 22:12

## 2020-06-24 RX ADMIN — Medication 1 DROP(S): at 23:55

## 2020-06-24 RX ADMIN — Medication 5 MILLIGRAM(S): at 13:53

## 2020-06-24 RX ADMIN — AMLODIPINE BESYLATE 2.5 MILLIGRAM(S): 2.5 TABLET ORAL at 13:52

## 2020-06-24 RX ADMIN — Medication 1 PACKET(S): at 17:52

## 2020-06-24 RX ADMIN — ENOXAPARIN SODIUM 30 MILLIGRAM(S): 100 INJECTION SUBCUTANEOUS at 13:53

## 2020-06-24 NOTE — PROGRESS NOTE ADULT - PROBLEM SELECTOR PLAN 2
- H/o HTN. Home medication includes HCTZ and losartan  - Hold thiazide in the setting of hyponatremia; will need to discontinue on discharge as well  - C/w amlodipine 2.5 mg daily, uptitrate as needed   - If sbp >180, will give hydralazine 5 mg IVP x1 and reassess  - VS q8h - H/o HTN. Home medication includes HCTZ and losartan  - Hold thiazide and losartan in the setting of hyponatremia; will need to discontinue on discharge as well  - Uptitrating amlodipine to 5 mg daily.  - If sbp >180, will give hydralazine 5 mg IVP x1 and reassess  - VS q8h

## 2020-06-24 NOTE — PROGRESS NOTE ADULT - PROBLEM SELECTOR PLAN 1
- P/w Na+ of 108, baseline is 144 (10/2019) likely w/ symptomatic chronic hypovolemic hyponatremia. Likely multifactorial 2/2 decreased solute intake, thiazide use, SIADH  - s/p 15 cc 3% HTS,  cc/hr x 3 hrs on the day of admission  - Do not over correct for >6-8 meq/24 hours.  - Sosm 235, Uosm 327, Emiliano 39 on admission, however values were taken after receiving IVF including hypertonic saline and NS. Will hold diuretics.  - Nephrology following, recs appreciated. Patient is to drink to thirst.  - Imaging study to assess for possible malignancy. CTH w/ no acute bleed or mass effect. CXR clear.  - Currently slowly improving, now Na >120.

## 2020-06-24 NOTE — CHART NOTE - NSCHARTNOTEFT_GEN_A_CORE
Spoke with Dr. Albaro Kohler over the phone. Discussed clinical course. Informed that patient's thiazide and ARB were discontinued during the hospitalization. Discussed post-discharge plan including repeat BMP next week.    Rene Maria, PGY-2  Team 7 Resident

## 2020-06-24 NOTE — PROGRESS NOTE ADULT - PROBLEM SELECTOR PLAN 1
Pt. with hypo-osmolar hyponatremia in the setting of thiazide diuretic and decreased PO intake. sNa on arrival was 108. Pt. given 15 cc of 3% initially but then started on fluid restriction. sNa has improved appropriately to 119 yesterday. Today in AM, sNa 122 and pt. reports decrease PO. Recommend to start Ensure TID. Encourage PO (solute) intake. Start fluid restriction < 1L/day. Check sNa in evening. Goal sNa tomorrow AM is 130.     If any questions, please feel free to contact me  Justin Nunez   Nephrology Fellow  [phone]  (After 5 pm or on weekends please page the on-call fellow)

## 2020-06-24 NOTE — PROGRESS NOTE ADULT - ASSESSMENT
89 y.o F w/ PMH HTN and polymyalgia rheumatica (on prednisone) who presents w/ metabolic encephalpoathy in the setting of chronic hypovolemic hyponatremia likely multifactorial-thiazide use, poor solute intake, possible or SIADH. 89 yo F w/ PMH HTN and polymyalgia rheumatica (on prednisone) who presents w/ metabolic encephalpoathy in the setting of chronic hypovolemic hyponatremia likely multifactorial-thiazide use, poor solute intake, possible or SIADH.

## 2020-06-24 NOTE — PROGRESS NOTE ADULT - SUBJECTIVE AND OBJECTIVE BOX
*************************************  Rene Maria M.D., Ph.D. | PGY-2  Department of Internal Medicine  [phone] ([facility]) | [id number] ([facility])  *************************************      Patient is a 89y old  Female who presents with a chief complaint of weakness (24 Jun 2020 06:55)      SUBJECTIVE / OVERNIGHT EVENTS:  1 BM per nursing 3PM yesterday. No acute overnight event reported. Patient was seen and examined at bedside. Sleeping however easily awaken. No acute complaints, feels well however tired as patient had poor sleep. Denies fever, chills, SOB, chest pain, dizziness, or coughs.    MEDICATIONS  (STANDING):  amLODIPine   Tablet 2.5 milliGRAM(s) Oral daily  artificial tears (preservative free) Ophthalmic Solution 1 Drop(s) Both EYES four times a day  enoxaparin Injectable 30 milliGRAM(s) SubCutaneous daily  melatonin 6 milliGRAM(s) Oral at bedtime  predniSONE   Tablet 5 milliGRAM(s) Oral three times a day    MEDICATIONS  (PRN):      CAPILLARY BLOOD GLUCOSE        I&O's Summary    23 Jun 2020 07:01  -  24 Jun 2020 07:00  --------------------------------------------------------  IN: 500 mL / OUT: 0 mL / NET: 500 mL        Vital Signs Last 24 Hrs  T(C): 36.9 (24 Jun 2020 05:19), Max: 37 (23 Jun 2020 20:59)  T(F): 98.4 (24 Jun 2020 05:19), Max: 98.6 (23 Jun 2020 20:59)  HR: 83 (24 Jun 2020 05:19) (83 - 93)  BP: 171/56 (24 Jun 2020 05:19) (134/56 - 171/56)  BP(mean): --  RR: 16 (24 Jun 2020 05:19) (16 - 19)  SpO2: 98% (24 Jun 2020 05:19) (97% - 98%)    PHYSICAL EXAM:  GENERAL: NAD, awake, alert, following commands  HEAD: Atraumatic, Normocephalic  EYES: EOMI, PERRL, conjunctiva and sclera clear  NECK: Supple  CHEST/LUNG: Clear to auscultation bilaterally; No wheezes or crackles  HEART: Normal S1/S2; Regular rate and rhythm; +systolic murmurs  ABDOMEN: Soft, Nontender, Nondistended  EXTREMITIES: 1+ Peripheral Pulses; No clubbing, cyanosis, or edema  PSYCH: A&Ox2 (self, location)  NEUROLOGY: no focal neurologic deficit in CN II-XII  SKIN: patchy ecchymoses BLE and BUE    LABS:                        9.9    17.18 )-----------( 419      ( 24 Jun 2020 05:45 )             28.7      06-24    122<L>  |  87<L>  |  23  ----------------------------<  117<H>  4.2   |  23  |  1.09    Ca    9.2      24 Jun 2020 05:45  Phos  2.5     06-24  Mg     1.8     06-24    TPro  5.6<L>  /  Alb  3.2<L>  /  TBili  0.3  /  DBili  x   /  AST  23  /  ALT  17  /  AlkPhos  53  06-24              RADIOLOGY & ADDITIONAL TESTS:    Imaging Personally Reviewed:    Consultant(s) Notes Reviewed:      Care Discussed with Consultants/Other Providers:

## 2020-06-24 NOTE — PROGRESS NOTE ADULT - ATTENDING COMMENTS
Pt seen and examined at bedside this morning. Case d/w Dr. Maria and agree with findings and plan above with few additions.    Mental status now at baseline - AAOx2.  Hyponatremia improving, Na 124 now.  Renal f/u appreciated.  Hold IVF, hold diuretics, 1L fluid restriction.  Leukocytosis without any obvious source of infection at this point.  Remains afebrile; UA, Ucx, CXR unremarkable.  PT for d/c planning to [facility] when bed available.

## 2020-06-24 NOTE — PROGRESS NOTE ADULT - SUBJECTIVE AND OBJECTIVE BOX
[facility] DIVISION OF KIDNEY DISEASES AND HYPERTENSION -- FOLLOW UP NOTE  --------------------------------------------------------------------------------  HPI: 89-year-old female, with HTN presented to [facility] ER due to increasing weakness, admitted for hyponatremia. Pt. had fall about 1 month ago when she got out of bed and has been deteriorating since then. Son provided pt. with aid and says that his mother was skipping meals and had decreased appetite in the last month. Of note, pt. taking HCTZ 50 mg for BP control. Nephrology team consulted for hyponatremia. On arrival, sNa was 108, and improved appropriately to 119 yesterday. Today, sNa is 122.    Pt. evalauted at bedside, still reports decreased PO intake.    PAST HISTORY  --------------------------------------------------------------------------------  No significant changes to PMH, PSH, FHx, SHx, unless otherwise noted    ALLERGIES & MEDICATIONS  --------------------------------------------------------------------------------  Allergies    No Known Allergies    Intolerances      Standing Inpatient Medications  amLODIPine   Tablet 5 milliGRAM(s) Oral daily  amLODIPine   Tablet 2.5 milliGRAM(s) Oral once  artificial tears (preservative free) Ophthalmic Solution 1 Drop(s) Both EYES four times a day  enoxaparin Injectable 30 milliGRAM(s) SubCutaneous daily  melatonin 6 milliGRAM(s) Oral at bedtime  predniSONE   Tablet 5 milliGRAM(s) Oral three times a day    REVIEW OF SYSTEMS  --------------------------------------------------------------------------------  Gen: no lethargy  Respiratory: No dyspnea  CV: No chest pain  GI: + decrease appetite   MSK: no LE edema  Neuro: No dizziness  Heme: multiple bruises    All other systems were reviewed and are negative, except as noted.    VITALS/PHYSICAL EXAM  --------------------------------------------------------------------------------  T(C): 36.9 (06-24-20 @ 05:19), Max: 37 (06-23-20 @ 20:59)  HR: 83 (06-24-20 @ 05:19) (83 - 93)  BP: 171/56 (06-24-20 @ 05:19) (134/56 - 171/56)  RR: 16 (06-24-20 @ 05:19) (16 - 19)  SpO2: 98% (06-24-20 @ 05:19) (97% - 98%)  Wt(kg): --    06-23-20 @ 07:01  -  06-24-20 @ 07:00  --------------------------------------------------------  IN: 500 mL / OUT: 0 mL / NET: 500 mL    Physical Exam:  	Gen: NAD  	HEENT: MMM  	Pulm: CTA B/L  	CV: S1S2  	Abd: Soft, +BS   	Ext: No LE edema B/L  	Neuro: Awake  	Skin: multiple bruises 	    LABS/STUDIES  --------------------------------------------------------------------------------              9.9    17.18 >-----------<  419      [06-24-20 @ 05:45]              28.7     122  |  87  |  23  ----------------------------<  117      [06-24-20 @ 05:45]  4.2   |  23  |  1.09        Ca     9.2     [06-24-20 @ 05:45]      Mg     1.8     [06-24-20 @ 05:45]      Phos  2.5     [06-24-20 @ 05:45]    Creatinine Trend:  SCr 1.09 [06-24 @ 05:45]  SCr 1.15 [06-23 @ 22:55]  SCr 1.07 [06-23 @ 15:08]  SCr 1.09 [06-23 @ 05:15]  SCr 1.08 [06-22 @ 23:10}

## 2020-06-25 ENCOUNTER — TRANSCRIPTION ENCOUNTER (OUTPATIENT)
Age: 85
End: 2020-06-25

## 2020-06-25 LAB
ANION GAP SERPL CALC-SCNC: 14 MMO/L — SIGNIFICANT CHANGE UP (ref 7–14)
BUN SERPL-MCNC: 29 MG/DL — HIGH (ref 7–23)
CALCIUM SERPL-MCNC: 9.9 MG/DL — SIGNIFICANT CHANGE UP (ref 8.4–10.5)
CHLORIDE SERPL-SCNC: 91 MMOL/L — LOW (ref 98–107)
CO2 SERPL-SCNC: 21 MMOL/L — LOW (ref 22–31)
CREAT SERPL-MCNC: 1.09 MG/DL — SIGNIFICANT CHANGE UP (ref 0.5–1.3)
GLUCOSE SERPL-MCNC: 114 MG/DL — HIGH (ref 70–99)
MAGNESIUM SERPL-MCNC: 1.9 MG/DL — SIGNIFICANT CHANGE UP (ref 1.6–2.6)
PHOSPHATE SERPL-MCNC: 2.4 MG/DL — LOW (ref 2.5–4.5)
POTASSIUM SERPL-MCNC: 4.3 MMOL/L — SIGNIFICANT CHANGE UP (ref 3.5–5.3)
POTASSIUM SERPL-SCNC: 4.3 MMOL/L — SIGNIFICANT CHANGE UP (ref 3.5–5.3)
SODIUM SERPL-SCNC: 126 MMOL/L — LOW (ref 135–145)

## 2020-06-25 PROCEDURE — 99232 SBSQ HOSP IP/OBS MODERATE 35: CPT | Mod: GC

## 2020-06-25 RX ORDER — SODIUM,POTASSIUM PHOSPHATES 278-250MG
1 POWDER IN PACKET (EA) ORAL ONCE
Refills: 0 | Status: COMPLETED | OUTPATIENT
Start: 2020-06-25 | End: 2020-06-25

## 2020-06-25 RX ADMIN — ENOXAPARIN SODIUM 30 MILLIGRAM(S): 100 INJECTION SUBCUTANEOUS at 11:08

## 2020-06-25 RX ADMIN — Medication 5 MILLIGRAM(S): at 13:10

## 2020-06-25 RX ADMIN — AMLODIPINE BESYLATE 5 MILLIGRAM(S): 2.5 TABLET ORAL at 05:51

## 2020-06-25 RX ADMIN — Medication 1 DROP(S): at 17:30

## 2020-06-25 RX ADMIN — Medication 1 DROP(S): at 05:51

## 2020-06-25 RX ADMIN — Medication 6 MILLIGRAM(S): at 22:12

## 2020-06-25 RX ADMIN — Medication 5 MILLIGRAM(S): at 05:51

## 2020-06-25 RX ADMIN — Medication 1 PACKET(S): at 13:10

## 2020-06-25 RX ADMIN — Medication 5 MILLIGRAM(S): at 22:15

## 2020-06-25 RX ADMIN — Medication 1 DROP(S): at 11:07

## 2020-06-25 NOTE — ADVANCED PRACTICE NURSE CONSULT - RECOMMEDATIONS
Recommend follow up care at [facility]: [phone]. Address: [address] [place] [address].     Topical Recommendations:  Sween 24 to bilateral upper and lower extremities daily.    Left lateral lower leg- Gently cleanse with NS. Leave Steri-strips in place, allow them to fall off on their own. Apply liquid barrier to periwound skin. Cover with Silicone foam with border. Change every three days.    Continue low air loss bed therapy, continue heel elevation with Z-flex fluidized positioning boots, continue to turn & reposition q2h with Z-laurita fluidized positioning device, soft pillow between bony prominences, continue use of single breathable pad, continue measures to decrease friction/shear/pressure.     Continue with nutritional support as per dietary/orders.    Findings and plan discussed with patient, RN and primary team. Patient educated on topical wound therapy. All questions and concerns addressed.    Please contact Wound Care Service Line if we can be of further assistance (ext [phone]).

## 2020-06-25 NOTE — DIETITIAN INITIAL EVALUATION ADULT. - PERTINENT MEDS FT
MEDICATIONS  (STANDING):  amLODIPine   Tablet 5 milliGRAM(s) Oral daily  artificial tears (preservative free) Ophthalmic Solution 1 Drop(s) Both EYES four times a day  enoxaparin Injectable 30 milliGRAM(s) SubCutaneous daily  melatonin 6 milliGRAM(s) Oral at bedtime  predniSONE   Tablet 5 milliGRAM(s) Oral three times a day

## 2020-06-25 NOTE — DISCHARGE NOTE NURSING/CASE MANAGEMENT/SOCIAL WORK - PATIENT PORTAL LINK FT
You can access the FollowMyHealth Patient Portal offered by [facility] by registering at the following website: [email or web address]. By joining [facility]’s FollowMyHealth portal, you will also be able to view your health information using other applications (apps) compatible with our system.

## 2020-06-25 NOTE — PROGRESS NOTE ADULT - SUBJECTIVE AND OBJECTIVE BOX
Uyen Cruz PGY1    Patient is a 89y old  Female who presents with a chief complaint of weakness (24 Jun 2020 10:14)      SUBJECTIVE / OVERNIGHT EVENTS:    MEDICATIONS  (STANDING):  amLODIPine   Tablet 5 milliGRAM(s) Oral daily  artificial tears (preservative free) Ophthalmic Solution 1 Drop(s) Both EYES four times a day  enoxaparin Injectable 30 milliGRAM(s) SubCutaneous daily  melatonin 6 milliGRAM(s) Oral at bedtime  predniSONE   Tablet 5 milliGRAM(s) Oral three times a day    MEDICATIONS  (PRN):      CAPILLARY BLOOD GLUCOSE        I&O's Summary    23 Jun 2020 07:01  -  24 Jun 2020 07:00  --------------------------------------------------------  IN: 500 mL / OUT: 0 mL / NET: 500 mL    24 Jun 2020 07:01  -  25 Jun 2020 06:37  --------------------------------------------------------  IN: 520 mL / OUT: 0 mL / NET: 520 mL        Vital Signs Last 24 Hrs  T(C): 36.6 (25 Jun 2020 05:48), Max: 36.6 (24 Jun 2020 15:13)  T(F): 97.8 (25 Jun 2020 05:48), Max: 97.9 (24 Jun 2020 15:13)  HR: 76 (25 Jun 2020 05:48) (76 - 94)  BP: 120/47 (25 Jun 2020 05:48) (117/53 - 158/60)  BP(mean): --  RR: 17 (25 Jun 2020 05:48) (17 - 17)  SpO2: 98% (25 Jun 2020 05:48) (97% - 98%)    PHYSICAL EXAM:  GENERAL: NAD, well-developed, well-nourished  HEAD: Atraumatic, Normocephalic  EYES: EOMI, PERRLA, conjunctiva and sclera clear  NECK: Supple, No JVD  CHEST/LUNG: Clear to auscultation bilaterally; No wheezes or crackles  HEART: Normal S1/S2; Regular rate and rhythm; No murmurs, rubs, or gallops  ABDOMEN: Soft, Nontender, Nondistended; Bowel sounds present  EXTREMITIES: 2+ Peripheral Pulses; No clubbing, cyanosis, or edema  PSYCH: A&Ox3  NEUROLOGY: no focal neurologic deficit  SKIN: No rashes or lesions    LABS:                        9.9    17.18 )-----------( 419      ( 24 Jun 2020 05:45 )             28.7      06-24    125<L>  |  90<L>  |  31<H>  ----------------------------<  123<H>  4.9   |  26  |  1.27    Ca    9.5      24 Jun 2020 22:14  Phos  2.4     06-24  Mg     1.8     06-24    TPro  5.6<L>  /  Alb  3.2<L>  /  TBili  0.3  /  DBili  x   /  AST  23  /  ALT  17  /  AlkPhos  53  06-24              RADIOLOGY & ADDITIONAL TESTS:    Imaging Personally Reviewed:    Consultant(s) Notes Reviewed:      Care Discussed with Consultants/Other Providers: Uyen Cruz PGY1    Patient is a 89y old  Female who presents with a chief complaint of weakness (24 Jun 2020 10:14)      SUBJECTIVE / OVERNIGHT EVENTS: No acute events overnight. She denies pain.     MEDICATIONS  (STANDING):  amLODIPine   Tablet 5 milliGRAM(s) Oral daily  artificial tears (preservative free) Ophthalmic Solution 1 Drop(s) Both EYES four times a day  enoxaparin Injectable 30 milliGRAM(s) SubCutaneous daily  melatonin 6 milliGRAM(s) Oral at bedtime  predniSONE   Tablet 5 milliGRAM(s) Oral three times a day    MEDICATIONS  (PRN):      CAPILLARY BLOOD GLUCOSE        I&O's Summary    23 Jun 2020 07:01  -  24 Jun 2020 07:00  --------------------------------------------------------  IN: 500 mL / OUT: 0 mL / NET: 500 mL    24 Jun 2020 07:01  -  25 Jun 2020 06:37  --------------------------------------------------------  IN: 520 mL / OUT: 0 mL / NET: 520 mL        Vital Signs Last 24 Hrs  T(C): 36.6 (25 Jun 2020 05:48), Max: 36.6 (24 Jun 2020 15:13)  T(F): 97.8 (25 Jun 2020 05:48), Max: 97.9 (24 Jun 2020 15:13)  HR: 76 (25 Jun 2020 05:48) (76 - 94)  BP: 120/47 (25 Jun 2020 05:48) (117/53 - 158/60)  BP(mean): --  RR: 17 (25 Jun 2020 05:48) (17 - 17)  SpO2: 98% (25 Jun 2020 05:48) (97% - 98%)    PHYSICAL EXAM:  GENERAL: NAD, well-developed, well-nourished  HEAD: Atraumatic, Normocephalic  EYES: EOMI, PERRLA, conjunctiva and sclera clear  NECK: Supple, No JVD  CHEST/LUNG: Clear to auscultation bilaterally; No wheezes or crackles  HEART: 3/6 systolic murmur in upper sternum bilaterally, regular rate and rhythm.   ABDOMEN: Soft, Nontender, Nondistended; Bowel sounds present  EXTREMITIES: 2+ Peripheral Pulses; No clubbing, cyanosis, or edema. Skin bruising bilateral wrists and ankles.   PSYCH: A&Ox3  NEUROLOGY: no focal neurologic deficit  SKIN: No rashes or lesions    LABS:                        9.9    17.18 )-----------( 419      ( 24 Jun 2020 05:45 )             28.7      06-24    125<L>  |  90<L>  |  31<H>  ----------------------------<  123<H>  4.9   |  26  |  1.27    Ca    9.5      24 Jun 2020 22:14  Phos  2.4     06-24  Mg     1.8     06-24    TPro  5.6<L>  /  Alb  3.2<L>  /  TBili  0.3  /  DBili  x   /  AST  23  /  ALT  17  /  AlkPhos  53  06-24              RADIOLOGY & ADDITIONAL TESTS: n/a    Imaging Personally Reviewed:    Consultant(s) Notes Reviewed:      Care Discussed with Consultants/Other Providers: Uyen Cruz PGY1    Patient is a 89y old  Female who presents with a chief complaint of weakness (24 Jun 2020 10:14)      SUBJECTIVE / OVERNIGHT EVENTS: No acute events overnight. She denies pain.     MEDICATIONS  (STANDING):  amLODIPine   Tablet 5 milliGRAM(s) Oral daily  artificial tears (preservative free) Ophthalmic Solution 1 Drop(s) Both EYES four times a day  enoxaparin Injectable 30 milliGRAM(s) SubCutaneous daily  melatonin 6 milliGRAM(s) Oral at bedtime  predniSONE   Tablet 5 milliGRAM(s) Oral three times a day    MEDICATIONS  (PRN):      CAPILLARY BLOOD GLUCOSE        I&O's Summary    23 Jun 2020 07:01  -  24 Jun 2020 07:00  --------------------------------------------------------  IN: 500 mL / OUT: 0 mL / NET: 500 mL    24 Jun 2020 07:01  -  25 Jun 2020 06:37  --------------------------------------------------------  IN: 520 mL / OUT: 0 mL / NET: 520 mL        Vital Signs Last 24 Hrs  T(C): 36.6 (25 Jun 2020 05:48), Max: 36.6 (24 Jun 2020 15:13)  T(F): 97.8 (25 Jun 2020 05:48), Max: 97.9 (24 Jun 2020 15:13)  HR: 76 (25 Jun 2020 05:48) (76 - 94)  BP: 120/47 (25 Jun 2020 05:48) (117/53 - 158/60)  BP(mean): --  RR: 17 (25 Jun 2020 05:48) (17 - 17)  SpO2: 98% (25 Jun 2020 05:48) (97% - 98%)    PHYSICAL EXAM:  GENERAL: NAD, well-developed, well-nourished  HEAD: Atraumatic, Normocephalic  EYES: EOMI, PERRLA, conjunctiva and sclera clear  NECK: Supple, No JVD  CHEST/LUNG: Clear to auscultation bilaterally; No wheezes or crackles  HEART: 3/6 systolic murmur in upper sternum bilaterally, regular rate and rhythm.   ABDOMEN: Soft, Nontender, Nondistended; Bowel sounds present  EXTREMITIES: 2+ Peripheral Pulses; No clubbing, cyanosis, or edema. Skin bruising bilateral wrists and ankles.   PSYCH: A&Ox3  NEUROLOGY: no focal neurologic deficit  SKIN: Ecchymoses on upper/lower extremity; left wound dressing C/D/I    LABS:                        9.9    17.18 )-----------( 419      ( 24 Jun 2020 05:45 )             28.7      06-24    125<L>  |  90<L>  |  31<H>  ----------------------------<  123<H>  4.9   |  26  |  1.27    Ca    9.5      24 Jun 2020 22:14  Phos  2.4     06-24  Mg     1.8     06-24    TPro  5.6<L>  /  Alb  3.2<L>  /  TBili  0.3  /  DBili  x   /  AST  23  /  ALT  17  /  AlkPhos  53  06-24              RADIOLOGY & ADDITIONAL TESTS: n/a    Imaging Personally Reviewed:    Consultant(s) Notes Reviewed:      Care Discussed with Consultants/Other Providers:

## 2020-06-25 NOTE — DISCHARGE NOTE NURSING/CASE MANAGEMENT/SOCIAL WORK - NSDCPNINST_GEN_ALL_CORE
patient is alert and orientedx1, patient is being dc to rehab, patient admitted with hyponateremia which is resolved. patient is stable at the time of discharge. patient is alert and orientedx1, patient is being dc to rehab, patient admitted with hyponateremia which is resolved. patient has a left shin skin tear with steri strips which are to be kept on until they fall on their own and foam border. patient is stable at the time of discharge.

## 2020-06-25 NOTE — DIETITIAN INITIAL EVALUATION ADULT. - PERTINENT LABORATORY DATA
06-25 Na126 mmol/L<L> Glu 114 mg/dL<H> K+ 4.3 mmol/L Cr  1.09 mg/dL BUN 29 mg/dL<H> 06-25 Phos 2.4 mg/dL<L> 06-24 Alb 3.2 g/dL<L>

## 2020-06-25 NOTE — PROGRESS NOTE ADULT - ASSESSMENT
89 yo F w/ PMH HTN and polymyalgia rheumatica (on prednisone) who presents w/ metabolic encephalpoathy in the setting of chronic hypovolemic hyponatremia likely multifactorial-thiazide use, poor solute intake, possible or SIADH. 89 yo F w/ PMH HTN and polymyalgia rheumatica (on prednisone) who presents w/ metabolic encephalpoathy in the setting of chronic hypovolemic hyponatremia likely multifactorial-thiazide use, poor solute intake or SIADH. She was admitted on 6/21 with Na level of 108 (baseline 144). She has been fluid restricted and her thiazide and valsartan was discontinued on admission. Her sodium level has been improving (126 on 6/25). Mental status improved today (6/25) and ready for discharge. Planning on discharge today to rehab facility. Covid swab done prior to transfer to rehab.

## 2020-06-25 NOTE — ADVANCED PRACTICE NURSE CONSULT - REASON FOR CONSULT
Patient seen on skin care rounds after wound care referral received for assessment of skin impairment and recommendations of topical management. Chart reviewed: BMI 24.1kg/m2, Josue 17. Patient interviewed, patient stated "I don't know where the wound on my leg came from. I fell out of bed before I came here. I live at home and in [facility] assisted living." Patient H/O HTN and polymyalgia rheumatica (on prednisone) who presents w/ metabolic encephalpoathy in the setting of chronic hypovolemic hyponatremia likely multifactorial-thiazide use, poor solute intake, possible or SIADH. Patient seen and followed by Nephrology for hyponatremia, evaluated by medical ICU team on admission was not an ICU candidate at that time.

## 2020-06-25 NOTE — PROGRESS NOTE ADULT - PROBLEM SELECTOR PLAN 1
- P/w Na+ of 108, baseline is 144 (10/2019) likely w/ symptomatic chronic hypovolemic hyponatremia. Likely multifactorial 2/2 decreased solute intake, thiazide use, SIADH  - s/p 15 cc 3% HTS,  cc/hr x 3 hrs on the day of admission  - Do not over correct for >6-8 meq/24 hours.  - Sosm 235, Uosm 327, Emiliano 39 on admission, however values were taken after receiving IVF including hypertonic saline and NS. Will hold diuretics.  - Nephrology following, recs appreciated. Patient is to drink to thirst.  - Imaging study to assess for possible malignancy. CTH w/ no acute bleed or mass effect. CXR clear.  - Currently slowly improving, now Na >120. - P/w Na+ of 108, baseline is 144 (10/2019) likely w/ symptomatic chronic hypovolemic hyponatremia. Likely multifactorial 2/2 decreased solute intake, thiazide use, SIADH  - s/p 15 cc 3% HTS,  cc/hr x 3 hrs on the day of admission  - Do not over correct for >6-8 meq/24 hours.  - Sosm 235, Uosm 327, Emiliano 39 on admission, however values were taken after receiving IVF including hypertonic saline and NS. Will hold diuretics.  - Nephrology following, recs appreciated. Patient is to drink to thirst.  - CTH w/ no acute bleed or mass effect. CXR clear.  - Currently slowly improving, now Na >120.

## 2020-06-25 NOTE — DIETITIAN INITIAL EVALUATION ADULT. - OTHER INFO
Met with Pt.  Also spoke with RN.    Pt. alert, although is a questionable historian.    Good PO intake, per Pt. and nursing.  Pt. denies food allergies, nausea/vomiting/diarrhea, or issues with chewing/swallowing.  Endorses constipation and replies, when asked, that she has not had a BM in "1 week" & typically takes Metamucil daily to aid with regular bowel pattern PTA.  Last noted BM (6/21) was 4 days ago, per flow sheets.     States usual body weight as ~140lbs (63.6kg) and denies weight loss.  This is questionably accurate upon historical chart review:  59kg (July 2019)  59.4kg (Feb 2020)  61.2kg (Mar 2020)  55.9kg (currently June 2020)    Discussed prescribed diet, including 1.0L fluid restriction.  Pt. demonstrates limited understanding of diet education.  Encouraged PO intake and offered to obtain/provide food preferences. Declines Ensure supplement.

## 2020-06-25 NOTE — ADVANCED PRACTICE NURSE CONSULT - ASSESSMENT
Full note to follow. General: A & O x 2, patient requires one person assist for turning and positioning in bed, states that she is unable to ambulate. Pt bedbound, continent of urine and stool (able to ask for bedpan). Skin warm, dry with increased moisture in intertriginous folds, poor skin turgor, scattered areas of hyperpigmentation and hypopigmentation, scattered areas of ecchymosis on bilateral upper and lower extremities. Blanchable erythema on bilateral heels.     Vascular: Bilateral lower extremities with scattered areas of ecchymosis. Toenails in good hygiene. No temperature changes noted. No edema noted. Capillary refill <3 seconds. +2 DP/PT pulses. Denies numbness and tingling of bilateral lower extremities.    Left lateral lower leg- trauma wound with intact epidermal-dermal skin flap- [id number] ( " V"  shape wound) with 60% intact epidermal-dermal flap well approximated at the edge, 40% pink-moist granular base with fibrin film. Scant sero-fibrinous drainage. Periwound skin intact, no induration, no increased warmth, no edema, no erythema noted. Goals of care: Maintain well approximated wound edges (after cleansing of wound base, edges re-approximated and steri strips applied to maintain), autolytic debridement of exposed fibrin base, maintain moist environment to promote wound healing, protect periwound skin.

## 2020-06-25 NOTE — PROGRESS NOTE ADULT - ATTENDING COMMENTS
Pt seen and examined at bedside this morning. Case d/w Dr. Maria and agree with findings and plan above with few additions.    Metabolic encephalopathy 2/2 profound hyponatremia.  Mental status now at baseline - AAOx3.  Hyponatremia improving, Na 126 today.  Renal f/u appreciated.  Hold IVF, hold diuretics, 1L fluid restriction.  Leukocytosis without any obvious source of infection at this point.  Remains afebrile; UA, Ucx, CXR unremarkable.  D/c to [facility] today, pending COVID result.  Time spent ~ 35 mins. Pt seen and examined at bedside this morning. Case d/w Dr. Cruz and agree with findings and plan above with few additions.    Metabolic encephalopathy 2/2 profound hyponatremia.  Mental status now at baseline - AAOx3.  Hyponatremia improving, Na 126 today.  Renal f/u appreciated.  Hold IVF, hold diuretics, 1L fluid restriction.  Leukocytosis without any obvious source of infection at this point.  Remains afebrile; UA, Ucx, CXR unremarkable.  D/c to [facility] today, pending COVID result.  Time spent ~ 35 mins.

## 2020-06-25 NOTE — DIETITIAN INITIAL EVALUATION ADULT. - ADD RECOMMEND
1) D/C Ensure Clear if to continue 1.0L fluid restriction.  Continue regular diet, as tolerated.                                                                                2)Consider psyllium powder supplement to aid with regular bowel pattern.

## 2020-06-25 NOTE — PROGRESS NOTE ADULT - PROBLEM SELECTOR PLAN 2
- H/o HTN. Home medication includes HCTZ and losartan  - Hold thiazide and losartan in the setting of hyponatremia; will need to discontinue on discharge as well  - Uptitrating amlodipine to 5 mg daily.  - If sbp >180, will give hydralazine 5 mg IVP x1 and reassess  - VS q8h

## 2020-06-26 VITALS
RESPIRATION RATE: 16 BRPM | SYSTOLIC BLOOD PRESSURE: 125 MMHG | DIASTOLIC BLOOD PRESSURE: 55 MMHG | TEMPERATURE: 98 F | OXYGEN SATURATION: 99 % | HEART RATE: 90 BPM

## 2020-06-26 LAB — SARS-COV-2 RNA SPEC QL NAA+PROBE: SIGNIFICANT CHANGE UP

## 2020-06-26 PROCEDURE — 99239 HOSP IP/OBS DSCHRG MGMT >30: CPT

## 2020-06-26 RX ORDER — PSYLLIUM SEED (WITH DEXTROSE)
12 POWDER (GRAM) ORAL
Qty: 1360 | Refills: 0
Start: 2020-06-26 | End: 2020-07-25

## 2020-06-26 RX ORDER — MULTIVIT-MIN/FERROUS GLUCONATE 9 MG/15 ML
1 LIQUID (ML) ORAL
Qty: 30 | Refills: 0
Start: 2020-06-26 | End: 2020-07-25

## 2020-06-26 RX ADMIN — Medication 1 DROP(S): at 05:24

## 2020-06-26 RX ADMIN — Medication 5 MILLIGRAM(S): at 05:24

## 2020-06-26 RX ADMIN — Medication 1 DROP(S): at 00:35

## 2020-06-26 RX ADMIN — Medication 1 DROP(S): at 11:54

## 2020-06-26 RX ADMIN — AMLODIPINE BESYLATE 5 MILLIGRAM(S): 2.5 TABLET ORAL at 05:24

## 2020-06-26 RX ADMIN — Medication 5 MILLIGRAM(S): at 13:07

## 2020-06-26 RX ADMIN — ENOXAPARIN SODIUM 30 MILLIGRAM(S): 100 INJECTION SUBCUTANEOUS at 11:54

## 2020-06-26 NOTE — PROGRESS NOTE ADULT - SUBJECTIVE AND OBJECTIVE BOX
Uyen Cruz PGY1    Patient is a 89y old  Female who presents with a chief complaint of weakness (25 Jun 2020 06:34)      SUBJECTIVE / OVERNIGHT EVENTS:    MEDICATIONS  (STANDING):  amLODIPine   Tablet 5 milliGRAM(s) Oral daily  artificial tears (preservative free) Ophthalmic Solution 1 Drop(s) Both EYES four times a day  enoxaparin Injectable 30 milliGRAM(s) SubCutaneous daily  melatonin 6 milliGRAM(s) Oral at bedtime  predniSONE   Tablet 5 milliGRAM(s) Oral three times a day    MEDICATIONS  (PRN):      CAPILLARY BLOOD GLUCOSE        I&O's Summary    24 Jun 2020 07:01  -  25 Jun 2020 07:00  --------------------------------------------------------  IN: 520 mL / OUT: 0 mL / NET: 520 mL    25 Jun 2020 07:01  -  26 Jun 2020 06:22  --------------------------------------------------------  IN: 150 mL / OUT: 0 mL / NET: 150 mL        Vital Signs Last 24 Hrs  T(C): 37 (25 Jun 2020 21:10), Max: 37 (25 Jun 2020 21:10)  T(F): 98.6 (25 Jun 2020 21:10), Max: 98.6 (25 Jun 2020 21:10)  HR: 96 (25 Jun 2020 21:10) (91 - 96)  BP: 130/48 (25 Jun 2020 21:10) (120/48 - 130/48)  BP(mean): --  RR: 16 (25 Jun 2020 21:10) (16 - 17)  SpO2: 95% (25 Jun 2020 21:10) (95% - 96%)    PHYSICAL EXAM:  GENERAL: NAD, well-developed, well-nourished  HEAD: Atraumatic, Normocephalic  EYES: EOMI, PERRLA, conjunctiva and sclera clear  NECK: Supple, No JVD  CHEST/LUNG: Clear to auscultation bilaterally; No wheezes or crackles  HEART: Normal S1/S2; Regular rate and rhythm; No murmurs, rubs, or gallops  ABDOMEN: Soft, Nontender, Nondistended; Bowel sounds present  EXTREMITIES: 2+ Peripheral Pulses; No clubbing, cyanosis, or edema  PSYCH: A&Ox3  NEUROLOGY: no focal neurologic deficit  SKIN: No rashes or lesions    LABS:     06-25    126<L>  |  91<L>  |  29<H>  ----------------------------<  114<H>  4.3   |  21<L>  |  1.09    Ca    9.9      25 Jun 2020 06:00  Phos  2.4     06-25  Mg     1.9     06-25                RADIOLOGY & ADDITIONAL TESTS:    Imaging Personally Reviewed:    Consultant(s) Notes Reviewed:      Care Discussed with Consultants/Other Providers:

## 2020-06-26 NOTE — PROGRESS NOTE ADULT - PROBLEM SELECTOR PLAN 3
- Leukocytosis noted 16.5 at admission.  - Likely elevated due to dehydration vs. reactive. Also on chronic prednisone.  - Low suspicion for infection as remains afebrile, but infection may not present as typical in elderly  - F/u UCX, given ceftriaxone 1g x1  - CXR w/o consolidation.  - Will continue to monitor, culture if febrile.
- P/w leukocytosis of 16.5, now 18.29.   - Likely elevated due to dehydration vs. reactive. Also on chronic prednisone.  - Low suspicion for infection as remains afebrile, but infection may not present as typical in elderly  - F/u UCX, given ceftriaxone 1g x1  - CXR w/o consolidation.

## 2020-06-26 NOTE — PROGRESS NOTE ADULT - PROBLEM SELECTOR PLAN 4
- H/o polymyalgia rheumatica; on prednisone 5 mg TID  - C/w prednisone at home dose. Currently no MSK pain or weakness reported.

## 2020-06-26 NOTE — PROGRESS NOTE ADULT - PROBLEM SELECTOR PLAN 1
- P/w Na+ of 108, baseline is 144 (10/2019) likely w/ symptomatic chronic hypovolemic hyponatremia. Likely multifactorial 2/2 decreased solute intake, thiazide use, SIADH  - s/p 15 cc 3% HTS,  cc/hr x 3 hrs on the day of admission  - Do not over correct for >6-8 meq/24 hours.  - Sosm 235, Uosm 327, Emiliano 39 on admission, however values were taken after receiving IVF including hypertonic saline and NS. Will hold diuretics.  - Nephrology following, recs appreciated. Patient is to drink to thirst.  - CTH w/ no acute bleed or mass effect. CXR clear.  - Currently slowly improving, now Na >120. - P/w Na+ of 108, baseline is 144 (10/2019) likely w/ symptomatic chronic hypovolemic hyponatremia. Likely multifactorial 2/2 decreased solute intake, thiazide use, SIADH  - s/p 15 cc 3% HTS,  cc/hr x 3 hrs on the day of admission  - Do not over correct for >6-8 meq/24 hours.  - Sosm 235, Uosm 327, Emiliano 39 on admission, however values were taken after receiving IVF including hypertonic saline and NS. Will hold diuretics.  - Nephrology following, recs appreciated. Patient is to drink to thirst.  - CTH w/ no acute bleed or mass effect. CXR clear.  - Currently slowly improving, now Na >120, stable for discharge.

## 2020-06-26 NOTE — PROGRESS NOTE ADULT - ATTENDING COMMENTS
89F p/w Metabolic encephalopathy 2/2 profound hyponatremia.  Mental status now at baseline - AAOx3.  Hyponatremia improving.  Renal f/u appreciated.  Hold IVF, hold diuretics, 1L fluid restriction.  Leukocytosis without any obvious source of infection at this point.  Remains afebrile; UA, Ucx, CXR unremarkable.  D/c to [facility] today  d/c time 35min coordinating care

## 2020-06-26 NOTE — PROGRESS NOTE ADULT - PROBLEM SELECTOR PLAN 7
Transitions of Care Status:  1.  Name of PCP:  2.  PCP Contacted on Admission: [X  ] Y    [ ] N  Dr Albaro Ragsdale [phone]  3.  PCP contacted at Discharge: [ ] Y    [ ] N    [ ] N/A  4.  Post-Discharge Appointment Date and Location:  5.  Summary of Handoff given to PCP:
Transitions of Care Status:  1.  Name of PCP:  2.  PCP Contacted on Admission: [X  ] Y    [ ] N  Dr Albaro Ragsdale [phone]  3.  PCP contacted at Discharge: [ ] Y    [ ] N    [ ] N/A  4.  Post-Discharge Appointment Date and Location:  5.  Summary of Handoff given to PCP:
Transitions of Care Status:  1.  Name of PCP:  2.  PCP Contacted on Admission: [X  ] Y    [ ] N  Dr Albaro Ragsdale [phone]  3.  PCP contacted at Discharge: [ ] Y    [ ] N    [ ] N/A  4.  Post-Discharge Appointment Date and Location:  5.  Summary of Handoff given to PCP:
Transitions of Care Status:  1.  Name of PCP:  2.  PCP Contacted on Admission: [X  ] Y    [ ] N  Dr Albaro Ragsdale [phone]  3.  PCP contacted at Discharge: [ ] Y    [ ] N    [ ] N/A  4.  Post-Discharge Appointment Date and Location:  5.  Summary of Handoff given to PCP:
Transitions of Care Status:  1.  Name of PCP:  2.  PCP Contacted on Admission: [X  ] Y    [ ] N  Dr Albaro Ragsdale [phone]  3.  PCP contacted at Discharge: [ ] Y    [ ] N    [ ] N/A  4.  Post-Discharge Appointment Date and Location:  5.  Summary of Handoff given to PCP:

## 2020-06-26 NOTE — PROGRESS NOTE ADULT - ASSESSMENT
89 yo F w/ PMH HTN and polymyalgia rheumatica (on prednisone) who presents w/ metabolic encephalpoathy in the setting of chronic hypovolemic hyponatremia likely multifactorial-thiazide use, poor solute intake or SIADH. She was admitted on 6/21 with Na level of 108 (baseline 144). She has been fluid restricted and her thiazide and valsartan was discontinued on admission. Her sodium level has been improving (126 on 6/25). Mental status improved today (6/25) and ready for discharge. Planning on discharge today to rehab facility. Covid swab done prior to transfer to rehab. 89 yo F w/ PMH HTN and polymyalgia rheumatica (on prednisone) who presented w/ metabolic encephalpoathy in the setting of chronic hypovolemic hyponatremia likely multifactorial-thiazide use, poor solute intake or SIADH, now improved and ready for discharge 6/26. She was admitted on 6/21 with Na level of 108 (baseline 144). She has been fluid restricted and her thiazide and valsartan was discontinued on admission. Her sodium level has been improving (126 on 6/25). Mental status close to baseline now. COVID swab negative. ready to transfer to rehab.

## 2020-06-26 NOTE — PROGRESS NOTE ADULT - SUBJECTIVE AND OBJECTIVE BOX
Uyen Cruz PGY1    Patient is a 89y old  Female who presents with a chief complaint of weakness (26 Jun 2020 06:20)      SUBJECTIVE / OVERNIGHT EVENTS: No     MEDICATIONS  (STANDING):  amLODIPine   Tablet 5 milliGRAM(s) Oral daily  artificial tears (preservative free) Ophthalmic Solution 1 Drop(s) Both EYES four times a day  enoxaparin Injectable 30 milliGRAM(s) SubCutaneous daily  melatonin 6 milliGRAM(s) Oral at bedtime  predniSONE   Tablet 5 milliGRAM(s) Oral three times a day    MEDICATIONS  (PRN):      CAPILLARY BLOOD GLUCOSE        I&O's Summary    25 Jun 2020 07:01  -  26 Jun 2020 07:00  --------------------------------------------------------  IN: 150 mL / OUT: 0 mL / NET: 150 mL        Vital Signs Last 24 Hrs  T(C): 36.9 (26 Jun 2020 05:20), Max: 37 (25 Jun 2020 21:10)  T(F): 98.5 (26 Jun 2020 05:20), Max: 98.6 (25 Jun 2020 21:10)  HR: 91 (26 Jun 2020 05:20) (91 - 96)  BP: 132/48 (26 Jun 2020 05:20) (120/48 - 132/48)  BP(mean): --  RR: 19 (26 Jun 2020 05:20) (16 - 19)  SpO2: 98% (26 Jun 2020 05:20) (95% - 98%)    PHYSICAL EXAM:  GENERAL: NAD, well-developed, well-nourished  HEAD: Atraumatic, Normocephalic  EYES: EOMI, PERRLA, conjunctiva and sclera clear  NECK: Supple, No JVD  CHEST/LUNG: Clear to auscultation bilaterally; No wheezes or crackles  HEART: Normal S1/S2; Regular rate and rhythm; No murmurs, rubs, or gallops  ABDOMEN: Soft, Nontender, Nondistended; Bowel sounds present  EXTREMITIES: 2+ Peripheral Pulses; No clubbing, cyanosis, or edema  PSYCH: A&Ox3  NEUROLOGY: no focal neurologic deficit  SKIN: No rashes or lesions    LABS:     06-25    126<L>  |  91<L>  |  29<H>  ----------------------------<  114<H>  4.3   |  21<L>  |  1.09    Ca    9.9      25 Jun 2020 06:00  Phos  2.4     06-25  Mg     1.9     06-25                RADIOLOGY & ADDITIONAL TESTS:    Imaging Personally Reviewed:    Consultant(s) Notes Reviewed:      Care Discussed with Consultants/Other Providers: Uyen Michelleathy PGY1    Patient is a 89y old  Female who presents with a chief complaint of weakness (26 Jun 2020 06:20)      SUBJECTIVE / OVERNIGHT EVENTS: No acute events overnight, not in pain.     MEDICATIONS  (STANDING):  amLODIPine   Tablet 5 milliGRAM(s) Oral daily  artificial tears (preservative free) Ophthalmic Solution 1 Drop(s) Both EYES four times a day  enoxaparin Injectable 30 milliGRAM(s) SubCutaneous daily  melatonin 6 milliGRAM(s) Oral at bedtime  predniSONE   Tablet 5 milliGRAM(s) Oral three times a day    MEDICATIONS  (PRN):      CAPILLARY BLOOD GLUCOSE        I&O's Summary    25 Jun 2020 07:01  -  26 Jun 2020 07:00  --------------------------------------------------------  IN: 150 mL / OUT: 0 mL / NET: 150 mL        Vital Signs Last 24 Hrs  T(C): 36.9 (26 Jun 2020 05:20), Max: 37 (25 Jun 2020 21:10)  T(F): 98.5 (26 Jun 2020 05:20), Max: 98.6 (25 Jun 2020 21:10)  HR: 91 (26 Jun 2020 05:20) (91 - 96)  BP: 132/48 (26 Jun 2020 05:20) (120/48 - 132/48)  BP(mean): --  RR: 19 (26 Jun 2020 05:20) (16 - 19)  SpO2: 98% (26 Jun 2020 05:20) (95% - 98%)    PHYSICAL EXAM:  GENERAL: NAD, well-developed, well-nourished  HEAD: Atraumatic, Normocephalic  EYES: EOMI, PERRLA, conjunctiva and sclera clear  NECK: Supple, No JVD  CHEST/LUNG: Clear to auscultation bilaterally; No wheezes or crackles  HEART: Normal S1/S2; Regular rate and rhythm; No murmurs, rubs, or gallops  ABDOMEN: Soft, Nontender, Nondistended; Bowel sounds present  EXTREMITIES: 2+ Peripheral Pulses; No clubbing, cyanosis, or edema  PSYCH: A&Ox3  NEUROLOGY: no focal neurologic deficit  SKIN: No rashes or lesions    LABS:     06-25    126<L>  |  91<L>  |  29<H>  ----------------------------<  114<H>  4.3   |  21<L>  |  1.09    Ca    9.9      25 Jun 2020 06:00  Phos  2.4     06-25  Mg     1.9     06-25                RADIOLOGY & ADDITIONAL TESTS:    Imaging Personally Reviewed:    Consultant(s) Notes Reviewed:      Care Discussed with Consultants/Other Providers:

## 2020-07-24 ENCOUNTER — INPATIENT (INPATIENT)
Facility: HOSPITAL | Age: 85
LOS: 2 days | Discharge: HOME HEALTH | End: 2020-07-27
Attending: HOSPITALIST | Admitting: HOSPITALIST
Payer: MEDICARE

## 2020-07-24 VITALS
RESPIRATION RATE: 18 BRPM | DIASTOLIC BLOOD PRESSURE: 49 MMHG | HEART RATE: 94 BPM | TEMPERATURE: 98 F | OXYGEN SATURATION: 98 % | SYSTOLIC BLOOD PRESSURE: 145 MMHG

## 2020-07-24 DIAGNOSIS — I10 ESSENTIAL (PRIMARY) HYPERTENSION: ICD-10-CM

## 2020-07-24 DIAGNOSIS — I82.431 ACUTE EMBOLISM AND THROMBOSIS OF RIGHT POPLITEAL VEIN: ICD-10-CM

## 2020-07-24 DIAGNOSIS — Z29.9 ENCOUNTER FOR PROPHYLACTIC MEASURES, UNSPECIFIED: ICD-10-CM

## 2020-07-24 DIAGNOSIS — K64.9 UNSPECIFIED HEMORRHOIDS: ICD-10-CM

## 2020-07-24 DIAGNOSIS — M35.3 POLYMYALGIA RHEUMATICA: ICD-10-CM

## 2020-07-24 LAB
ACANTHOCYTES BLD QL SMEAR: SLIGHT — SIGNIFICANT CHANGE UP
ALBUMIN SERPL ELPH-MCNC: 3.4 G/DL — SIGNIFICANT CHANGE UP (ref 3.3–5)
ALP SERPL-CCNC: 54 U/L — SIGNIFICANT CHANGE UP (ref 40–120)
ALT FLD-CCNC: 11 U/L — SIGNIFICANT CHANGE UP (ref 4–33)
ANION GAP SERPL CALC-SCNC: 11 MMO/L — SIGNIFICANT CHANGE UP (ref 7–14)
ANISOCYTOSIS BLD QL: SLIGHT — SIGNIFICANT CHANGE UP
APTT BLD: 36.2 SEC — SIGNIFICANT CHANGE UP (ref 27–36.3)
AST SERPL-CCNC: 17 U/L — SIGNIFICANT CHANGE UP (ref 4–32)
BASOPHILS # BLD AUTO: 0.04 K/UL — SIGNIFICANT CHANGE UP (ref 0–0.2)
BASOPHILS NFR BLD AUTO: 0.2 % — SIGNIFICANT CHANGE UP (ref 0–2)
BASOPHILS NFR SPEC: 0.9 % — SIGNIFICANT CHANGE UP (ref 0–2)
BILIRUB SERPL-MCNC: 0.2 MG/DL — SIGNIFICANT CHANGE UP (ref 0.2–1.2)
BLASTS # FLD: 0 % — SIGNIFICANT CHANGE UP (ref 0–0)
BLD GP AB SCN SERPL QL: NEGATIVE — SIGNIFICANT CHANGE UP
BUN SERPL-MCNC: 37 MG/DL — HIGH (ref 7–23)
BURR CELLS BLD QL SMEAR: PRESENT — SIGNIFICANT CHANGE UP
CALCIUM SERPL-MCNC: 10 MG/DL — SIGNIFICANT CHANGE UP (ref 8.4–10.5)
CHLORIDE SERPL-SCNC: 108 MMOL/L — HIGH (ref 98–107)
CO2 SERPL-SCNC: 23 MMOL/L — SIGNIFICANT CHANGE UP (ref 22–31)
CREAT SERPL-MCNC: 1.14 MG/DL — SIGNIFICANT CHANGE UP (ref 0.5–1.3)
EOSINOPHIL # BLD AUTO: 0.09 K/UL — SIGNIFICANT CHANGE UP (ref 0–0.5)
EOSINOPHIL NFR BLD AUTO: 0.5 % — SIGNIFICANT CHANGE UP (ref 0–6)
EOSINOPHIL NFR FLD: 0 % — SIGNIFICANT CHANGE UP (ref 0–6)
GLUCOSE SERPL-MCNC: 101 MG/DL — HIGH (ref 70–99)
HCT VFR BLD CALC: 34.9 % — SIGNIFICANT CHANGE UP (ref 34.5–45)
HGB BLD-MCNC: 10.8 G/DL — LOW (ref 11.5–15.5)
IMM GRANULOCYTES NFR BLD AUTO: 1.8 % — HIGH (ref 0–1.5)
INR BLD: 0.97 — SIGNIFICANT CHANGE UP (ref 0.88–1.17)
LYMPHOCYTES # BLD AUTO: 1.9 K/UL — SIGNIFICANT CHANGE UP (ref 1–3.3)
LYMPHOCYTES # BLD AUTO: 10.4 % — LOW (ref 13–44)
LYMPHOCYTES NFR SPEC AUTO: 4.4 % — LOW (ref 13–44)
MACROCYTES BLD QL: SLIGHT — SIGNIFICANT CHANGE UP
MCHC RBC-ENTMCNC: 28.2 PG — SIGNIFICANT CHANGE UP (ref 27–34)
MCHC RBC-ENTMCNC: 30.9 % — LOW (ref 32–36)
MCV RBC AUTO: 91.1 FL — SIGNIFICANT CHANGE UP (ref 80–100)
METAMYELOCYTES # FLD: 0.9 % — SIGNIFICANT CHANGE UP (ref 0–1)
MONOCYTES # BLD AUTO: 1.62 K/UL — HIGH (ref 0–0.9)
MONOCYTES NFR BLD AUTO: 8.9 % — SIGNIFICANT CHANGE UP (ref 2–14)
MONOCYTES NFR BLD: 8.7 % — SIGNIFICANT CHANGE UP (ref 2–9)
MYELOCYTES NFR BLD: 0 % — SIGNIFICANT CHANGE UP (ref 0–0)
NEUTROPHIL AB SER-ACNC: 77.2 % — HIGH (ref 43–77)
NEUTROPHILS # BLD AUTO: 14.32 K/UL — HIGH (ref 1.8–7.4)
NEUTROPHILS NFR BLD AUTO: 78.2 % — HIGH (ref 43–77)
NEUTS BAND # BLD: 0.9 % — SIGNIFICANT CHANGE UP (ref 0–6)
NRBC # FLD: 0 K/UL — SIGNIFICANT CHANGE UP (ref 0–0)
OTHER - HEMATOLOGY %: 0 — SIGNIFICANT CHANGE UP
OVALOCYTES BLD QL SMEAR: SLIGHT — SIGNIFICANT CHANGE UP
PLATELET # BLD AUTO: 314 K/UL — SIGNIFICANT CHANGE UP (ref 150–400)
PLATELET COUNT - ESTIMATE: NORMAL — SIGNIFICANT CHANGE UP
PMV BLD: 9.5 FL — SIGNIFICANT CHANGE UP (ref 7–13)
POIKILOCYTOSIS BLD QL AUTO: SLIGHT — SIGNIFICANT CHANGE UP
POLYCHROMASIA BLD QL SMEAR: SLIGHT — SIGNIFICANT CHANGE UP
POTASSIUM SERPL-MCNC: 4 MMOL/L — SIGNIFICANT CHANGE UP (ref 3.5–5.3)
POTASSIUM SERPL-SCNC: 4 MMOL/L — SIGNIFICANT CHANGE UP (ref 3.5–5.3)
PROMYELOCYTES # FLD: 0 % — SIGNIFICANT CHANGE UP (ref 0–0)
PROT SERPL-MCNC: 5.8 G/DL — LOW (ref 6–8.3)
PROTHROM AB SERPL-ACNC: 11.1 SEC — SIGNIFICANT CHANGE UP (ref 9.8–13.1)
RBC # BLD: 3.83 M/UL — SIGNIFICANT CHANGE UP (ref 3.8–5.2)
RBC # FLD: 15 % — HIGH (ref 10.3–14.5)
RH IG SCN BLD-IMP: POSITIVE — SIGNIFICANT CHANGE UP
SODIUM SERPL-SCNC: 142 MMOL/L — SIGNIFICANT CHANGE UP (ref 135–145)
VARIANT LYMPHS # BLD: 7 % — SIGNIFICANT CHANGE UP
WBC # BLD: 18.3 K/UL — HIGH (ref 3.8–10.5)
WBC # FLD AUTO: 18.3 K/UL — HIGH (ref 3.8–10.5)

## 2020-07-24 PROCEDURE — 93971 EXTREMITY STUDY: CPT | Mod: 26,LT

## 2020-07-24 PROCEDURE — 99223 1ST HOSP IP/OBS HIGH 75: CPT | Mod: GC,AI

## 2020-07-24 PROCEDURE — 99284 EMERGENCY DEPT VISIT MOD MDM: CPT

## 2020-07-24 RX ORDER — DIPYRIDAMOLE 50 MG
1 TABLET ORAL
Qty: 0 | Refills: 0 | DISCHARGE

## 2020-07-24 RX ORDER — MECLIZINE HCL 12.5 MG
1 TABLET ORAL
Qty: 0 | Refills: 0 | DISCHARGE

## 2020-07-24 RX ORDER — PSYLLIUM SEED (WITH DEXTROSE)
1 POWDER (GRAM) ORAL DAILY
Refills: 0 | Status: DISCONTINUED | OUTPATIENT
Start: 2020-07-24 | End: 2020-07-27

## 2020-07-24 RX ORDER — LOSARTAN POTASSIUM 100 MG/1
50 TABLET, FILM COATED ORAL DAILY
Refills: 0 | Status: DISCONTINUED | OUTPATIENT
Start: 2020-07-24 | End: 2020-07-27

## 2020-07-24 RX ORDER — PANTOPRAZOLE SODIUM 20 MG/1
40 TABLET, DELAYED RELEASE ORAL
Refills: 0 | Status: DISCONTINUED | OUTPATIENT
Start: 2020-07-24 | End: 2020-07-27

## 2020-07-24 RX ORDER — VALSARTAN 80 MG/1
1 TABLET ORAL
Qty: 0 | Refills: 0 | DISCHARGE

## 2020-07-24 RX ORDER — APIXABAN 2.5 MG/1
10 TABLET, FILM COATED ORAL EVERY 12 HOURS
Refills: 0 | Status: DISCONTINUED | OUTPATIENT
Start: 2020-07-24 | End: 2020-07-27

## 2020-07-24 RX ADMIN — APIXABAN 10 MILLIGRAM(S): 2.5 TABLET, FILM COATED ORAL at 17:13

## 2020-07-24 RX ADMIN — Medication 5 MILLIGRAM(S): at 21:48

## 2020-07-24 NOTE — ED PROVIDER NOTE - ATTENDING CONTRIBUTION TO CARE
I performed a history and physical exam of the patient and discussed their management with the resident.  I reviewed the resident's note and agree with the documented findings and plan of care except as noted below. My medical decision making and observations are as follows:    89yo F pmhx polymyalgia rheumatica on prednisone, chronic hypovolemic hyponatremia here w/ DVT in RLE.  RLE swelling found in rehab, found to have an acute DVT of right popliteal and posterior tibial veins with loss of normal flow, compressibility and augmentation which was not present on prior exam on 7/11. Given lovenox 1m/kg and sent to hospitalist for admission for IVC filter placement.  Pt has no complaints at this time.  Denies chest pain, shortness of breath.  Reports intermittent pain to RLE but non currently.  RLE is asymmetrically swollen but nontender, RLE warm with good cap refill but difficult to plapate DP pulse due to swelling, heart rrr, lungs cta, abd soft ntnd.  Since we have no US on record, will send patient for DVT study to assess extent of clot, check labs, coags and plan for admission.

## 2020-07-24 NOTE — H&P ADULT - NSHPREVIEWOFSYSTEMS_GEN_ALL_CORE
REVIEW OF SYSTEMS:  CONSTITUTIONAL: No weakness, fevers or chills  EYES/ENT: No visual changes;  No vertigo or throat pain   NECK: No pain or stiffness  RESPIRATORY: No cough, wheezing, hemoptysis; No shortness of breath  CARDIOVASCULAR: No chest pain or palpitations  GASTROINTESTINAL: No abdominal or epigastric pain. No nausea, vomiting, or hematemesis; No diarrhea or constipation. No melena or hematochezia.  GENITOURINARY: No dysuria, frequency or hematuria  NEUROLOGICAL: No numbness or weakness  SKIN: No itching, rashes REVIEW OF SYSTEMS:  CONSTITUTIONAL: No weakness, fevers or chills  EYES/ENT: No visual changes;  No vertigo or throat pain   NECK: No pain or stiffness  RESPIRATORY: No cough, wheezing, hemoptysis; No shortness of breath  CARDIOVASCULAR: No chest pain or palpitations  GASTROINTESTINAL: No abdominal or epigastric pain. No nausea, vomiting, or hematemesis; No diarrhea, + constipation. No melena or hematochezia.  GENITOURINARY: No dysuria, frequency or hematuria  NEUROLOGICAL: No numbness or weakness  EXTREMITIES: Swollen RLE but no pain  SKIN: Some skin changes on b/l LE

## 2020-07-24 NOTE — H&P ADULT - NSHPPHYSICALEXAM_GEN_ALL_CORE
Vital Signs Last 24 Hrs  T(C): 37 (24 Jul 2020 11:57), Max: 37 (24 Jul 2020 11:57)  T(F): 98.6 (24 Jul 2020 11:57), Max: 98.6 (24 Jul 2020 11:57)  HR: 87 (24 Jul 2020 11:57) (87 - 94)  BP: 142/86 (24 Jul 2020 11:57) (142/86 - 145/49)  BP(mean): --  RR: 18 (24 Jul 2020 11:57) (18 - 18)  SpO2: 100% (24 Jul 2020 11:57) (98% - 100%)    GENERAL: NAD, sitting on bed comfortably  HEAD:  Atraumatic, Normocephalic  EYES: EOMI, PERRLA, conjunctiva and sclera clear  ENT: Moist mucous membranes  NECK: Supple, No JVD  CHEST/LUNG: Clear to auscultation bilaterally; No rales, rhonchi, wheezing, or rubs. Unlabored respirations  HEART: Regular rate and rhythm; No murmurs, rubs, or gallops  ABDOMEN: Bowel sounds present; Soft, Nontender, Nondistended. No hepatomegally  EXTREMITIES:  RLE painless swelling; b/l pretibial skin changes  NERVOUS SYSTEM:  Alert & Oriented X3, speech clear. No deficits   MSK: FROM all 4 extremities, full and equal strength  SKIN: b/l LE chronic skin lesions

## 2020-07-24 NOTE — ED PROVIDER NOTE - CLINICAL SUMMARY MEDICAL DECISION MAKING FREE TEXT BOX
Esteban PGY-3:  89yo F w/ pmhx as described in HPI here w/ RLE DVT which is acute, in context of falls high risk for truamatic bleed if put on full AC will require IVC filter, will admit for filter placement. Already fully AC this AM with lovenox by rehab, will monitor in ED has aide at bedside to prevent fall in ED.

## 2020-07-24 NOTE — ED ADULT NURSE NOTE - INTERVENTIONS DEFINITIONS
Non-slip footwear when patient is off stretcher/Stretcher in lowest position, wheels locked, appropriate side rails in place/Monitor gait and stability/Instruct patient to call for assistance/Physically safe environment: no spills, clutter or unnecessary equipment

## 2020-07-24 NOTE — H&P ADULT - PROBLEM SELECTOR PLAN 1
- Pt presented w/ RLE DVT for which she will be placed on oral anticoagulant (Eliquis 10bid for 7 days followed by 5mg bid for a total of 3 months)  - IVC filter not considered at this moment given lack of any life-threatening bleeding in the past and lack of indication - Pt presented w/ RLE DVT for which she will be placed on oral anticoagulant (Eliquis 10bid for 7 days followed by 5mg bid for a total of 3 months)  - IVC filter not considered at this moment given lack of any life-threatening   bleeding in the past and lack of indication  - Discussed w/ Dr. Lynn

## 2020-07-24 NOTE — H&P ADULT - PROBLEM SELECTOR PLAN 5
DVT ppx: Eliquis  Diet: DASH  Dispo: Back to rehab DVT ppx: Eliquis  Diet: DASH  Dispo: Back to rehab after PT consult

## 2020-07-24 NOTE — ED PROVIDER NOTE - PHYSICAL EXAMINATION
General: well appearing, well nourished, NAD  HEENT: pupils equal and reactive, normal external ears bilaterally   Cardiac: RRR  Resp: symmetric chest wall rise  Abd: soft, nontender, nondistended,   : no CVA tenderness  Neuro: Moving all extremities  Skin:  normal color for race  lower extremities: RLE > LLE in size, no calve tenderness appreciated

## 2020-07-24 NOTE — ED PROVIDER NOTE - OBJECTIVE STATEMENT
89yo F pmhx polymyalgia rheumatica on prednisone, chronic hypovolemic hypoynatremia here w/ DVT in RLE    RLE swelling found in rehab, found to have an acute DVT of right popliteal and posterior tibial veins with loss of normal flow, compressibility and augmentation which was not present on prior exam on 7/11. Given lovenox 1m/kg and sent to hospitalist for admission for IVC filter placement. New hx of clot. Denies CP/SOB.   Pt w/ hx of rectal bleeds in past, currently denies dark stools.   Documented conversation with son and Dr. Kohler regarding plan of IVC filter placement, agree with plan.    PCP: Albaro Kohler

## 2020-07-24 NOTE — H&P ADULT - PROBLEM SELECTOR PLAN 2
- c/w home medication of valsartan 160mg/day - c/w home medication of prednisone 5mg tid - c/w home medication of prednisone 5mg tid  - protonix 40 oral qd as ppx for GI bleed

## 2020-07-24 NOTE — H&P ADULT - ATTENDING COMMENTS
Pt sent from rehab due to RLE swelling. Pt denies any pain, and had continued to walk with walker as part of PT. US reveals acute RLE DVT above and below knee. First lifetime VTE per pt. Suspect DOAC for 3-6 months with surveillance US would be safest option; despite history of hemorrhoids, it does not appear the pt has ever had a major bleed. IVC filter would confer the usual procedural risks, and could also be thrombogenic itself. Will first speak with Dr. Lynn who has followed pt for hemorrhoids to obtain additional history, then confer with PCP Dr. Kohler.     Otherwise, pt will need PT consult in anticipation of returning to rehab.

## 2020-07-24 NOTE — H&P ADULT - NSHPSOCIALHISTORY_GEN_ALL_CORE
She lived in her own apartment with 2x/wk aid. Was able to do her basic ADLs. Ambulated with walker. With worsening hyponatremia had a 24 hour aid. Then after last hospitalization went to rehab. Denies smoking, drinking, and drug use.

## 2020-07-24 NOTE — ED ADULT NURSE NOTE - OBJECTIVE STATEMENT
pt received in rm 21 AAO x 3. pt reports she was sent from [facility] for a DVT to her right lower leg. pt denies sob, chest pain, n/v/d, fevers, chills, cough. respirations even and unlabored. right lower leg noted to be more edematous compared to left lower leg. capillary refill less than 2 to B/L feet. extremities warm to touch b/l. blanchable redness noted to sacrum. 22g iv placed to left ac. labs drawn and sent. will continue to monitor.

## 2020-07-24 NOTE — ED ADULT TRIAGE NOTE - CHIEF COMPLAINT QUOTE
Pt sent in for DVT of the right popliteal. Pt reports intermittent pain  . Pt denies sob or chest pain

## 2020-07-24 NOTE — H&P ADULT - NSHPLABSRESULTS_GEN_ALL_CORE
10.8   18.30 )-----------( 314      ( 24 Jul 2020 10:08 )             34.9     07-24    142  |  108<H>  |  37<H>  ----------------------------<  101<H>  4.0   |  23  |  1.14    Ca    10.0      24 Jul 2020 10:08    TPro  5.8<L>  /  Alb  3.4  /  TBili  0.2  /  DBili  x   /  AST  17  /  ALT  11  /  AlkPhos  54  07-24    PT/INR - ( 24 Jul 2020 10:08 )   PT: 11.1 SEC;   INR: 0.97          PTT - ( 24 Jul 2020 10:08 )  PTT:36.2 SEC    < from: US Duplex Venous Lower Ext Ltd, Right (07.24.20 @ 10:50) >    IMPRESSION:   Right deep venous thrombosis extending from the right femoral vein to the calf veins, corresponding with known deep venous thrombosis.    Acute deep venous thrombosis: above and below the knee.    < end of copied text >

## 2020-07-25 LAB
24R-OH-CALCIDIOL SERPL-MCNC: 43.8 NG/ML — SIGNIFICANT CHANGE UP (ref 30–80)
ALBUMIN SERPL ELPH-MCNC: 3.1 G/DL — LOW (ref 3.3–5)
ALP SERPL-CCNC: 50 U/L — SIGNIFICANT CHANGE UP (ref 40–120)
ALT FLD-CCNC: 10 U/L — SIGNIFICANT CHANGE UP (ref 4–33)
ANION GAP SERPL CALC-SCNC: 9 MMO/L — SIGNIFICANT CHANGE UP (ref 7–14)
AST SERPL-CCNC: 12 U/L — SIGNIFICANT CHANGE UP (ref 4–32)
BASOPHILS # BLD AUTO: 0.02 K/UL — SIGNIFICANT CHANGE UP (ref 0–0.2)
BASOPHILS NFR BLD AUTO: 0.2 % — SIGNIFICANT CHANGE UP (ref 0–2)
BILIRUB SERPL-MCNC: 0.2 MG/DL — SIGNIFICANT CHANGE UP (ref 0.2–1.2)
BUN SERPL-MCNC: 38 MG/DL — HIGH (ref 7–23)
CALCIUM SERPL-MCNC: 9.3 MG/DL — SIGNIFICANT CHANGE UP (ref 8.4–10.5)
CHLORIDE SERPL-SCNC: 108 MMOL/L — HIGH (ref 98–107)
CO2 SERPL-SCNC: 25 MMOL/L — SIGNIFICANT CHANGE UP (ref 22–31)
CREAT SERPL-MCNC: 1.2 MG/DL — SIGNIFICANT CHANGE UP (ref 0.5–1.3)
EOSINOPHIL # BLD AUTO: 0.03 K/UL — SIGNIFICANT CHANGE UP (ref 0–0.5)
EOSINOPHIL NFR BLD AUTO: 0.3 % — SIGNIFICANT CHANGE UP (ref 0–6)
GLUCOSE SERPL-MCNC: 102 MG/DL — HIGH (ref 70–99)
HCT VFR BLD CALC: 32.7 % — LOW (ref 34.5–45)
HGB BLD-MCNC: 10 G/DL — LOW (ref 11.5–15.5)
IMM GRANULOCYTES NFR BLD AUTO: 1.7 % — HIGH (ref 0–1.5)
LYMPHOCYTES # BLD AUTO: 1.43 K/UL — SIGNIFICANT CHANGE UP (ref 1–3.3)
LYMPHOCYTES # BLD AUTO: 12.4 % — LOW (ref 13–44)
MAGNESIUM SERPL-MCNC: 1.7 MG/DL — SIGNIFICANT CHANGE UP (ref 1.6–2.6)
MCHC RBC-ENTMCNC: 28.2 PG — SIGNIFICANT CHANGE UP (ref 27–34)
MCHC RBC-ENTMCNC: 30.6 % — LOW (ref 32–36)
MCV RBC AUTO: 92.4 FL — SIGNIFICANT CHANGE UP (ref 80–100)
MONOCYTES # BLD AUTO: 1 K/UL — HIGH (ref 0–0.9)
MONOCYTES NFR BLD AUTO: 8.7 % — SIGNIFICANT CHANGE UP (ref 2–14)
NEUTROPHILS # BLD AUTO: 8.84 K/UL — HIGH (ref 1.8–7.4)
NEUTROPHILS NFR BLD AUTO: 76.7 % — SIGNIFICANT CHANGE UP (ref 43–77)
NRBC # FLD: 0 K/UL — SIGNIFICANT CHANGE UP (ref 0–0)
PHOSPHATE SERPL-MCNC: 2.7 MG/DL — SIGNIFICANT CHANGE UP (ref 2.5–4.5)
PLATELET # BLD AUTO: 285 K/UL — SIGNIFICANT CHANGE UP (ref 150–400)
PMV BLD: 10.1 FL — SIGNIFICANT CHANGE UP (ref 7–13)
POTASSIUM SERPL-MCNC: 4.2 MMOL/L — SIGNIFICANT CHANGE UP (ref 3.5–5.3)
POTASSIUM SERPL-SCNC: 4.2 MMOL/L — SIGNIFICANT CHANGE UP (ref 3.5–5.3)
PROT SERPL-MCNC: 5.3 G/DL — LOW (ref 6–8.3)
RBC # BLD: 3.54 M/UL — LOW (ref 3.8–5.2)
RBC # FLD: 14.8 % — HIGH (ref 10.3–14.5)
SARS-COV-2 RNA SPEC QL NAA+PROBE: SIGNIFICANT CHANGE UP
SODIUM SERPL-SCNC: 142 MMOL/L — SIGNIFICANT CHANGE UP (ref 135–145)
WBC # BLD: 11.51 K/UL — HIGH (ref 3.8–10.5)
WBC # FLD AUTO: 11.51 K/UL — HIGH (ref 3.8–10.5)

## 2020-07-25 PROCEDURE — 99233 SBSQ HOSP IP/OBS HIGH 50: CPT | Mod: GC

## 2020-07-25 RX ADMIN — Medication 1 PACKET(S): at 12:01

## 2020-07-25 RX ADMIN — LOSARTAN POTASSIUM 50 MILLIGRAM(S): 100 TABLET, FILM COATED ORAL at 05:33

## 2020-07-25 RX ADMIN — PANTOPRAZOLE SODIUM 40 MILLIGRAM(S): 20 TABLET, DELAYED RELEASE ORAL at 05:33

## 2020-07-25 RX ADMIN — APIXABAN 10 MILLIGRAM(S): 2.5 TABLET, FILM COATED ORAL at 17:59

## 2020-07-25 RX ADMIN — Medication 5 MILLIGRAM(S): at 20:46

## 2020-07-25 RX ADMIN — Medication 5 MILLIGRAM(S): at 05:33

## 2020-07-25 RX ADMIN — APIXABAN 10 MILLIGRAM(S): 2.5 TABLET, FILM COATED ORAL at 05:33

## 2020-07-25 RX ADMIN — Medication 5 MILLIGRAM(S): at 13:27

## 2020-07-25 NOTE — PROGRESS NOTE ADULT - PROBLEM SELECTOR PLAN 4
- c/w home medication of valsartan 160mg/day - c/w home medication of valsartan 160mg/day --> switched to losartan 50qd

## 2020-07-25 NOTE — PROGRESS NOTE ADULT - SUBJECTIVE AND OBJECTIVE BOX
Jacob Madden  PGY1/Internal Medicine  [id number]    SAMARA REN  89y  Female      Patient is a 89y old  Female who presents with a chief complaint of RLE US w/ DVT (24 Jul 2020 12:48)      INTERVAL HPI/OVERNIGHT EVENTS:    REVIEW OF SYSTEMS:  CONSTITUTIONAL: No weakness, fevers or chills  EYES/ENT: No visual changes;  No vertigo or throat pain   NECK: No pain or stiffness  RESPIRATORY: No cough, wheezing, hemoptysis; No shortness of breath  CARDIOVASCULAR: No chest pain or palpitations  GASTROINTESTINAL: No abdominal or epigastric pain. No nausea, vomiting, or hematemesis; No diarrhea or constipation. No melena or hematochezia.  GENITOURINARY: No dysuria, frequency or hematuria  NEUROLOGICAL: No numbness or weakness  SKIN: No itching, rashes      Vital Signs Last 24 Hrs  T(C): 36.8 (25 Jul 2020 05:32), Max: 37 (24 Jul 2020 11:57)  T(F): 98.2 (25 Jul 2020 05:32), Max: 98.6 (24 Jul 2020 11:57)  HR: 83 (25 Jul 2020 05:32) (83 - 94)  BP: 154/66 (25 Jul 2020 05:32) (142/86 - 157/74)  BP(mean): --  RR: 18 (25 Jul 2020 05:32) (18 - 18)  SpO2: 95% (25 Jul 2020 05:32) (95% - 100%)    PHYSICAL EXAM:  GENERAL: NAD, well-groomed, well-developed  HEAD:  Atraumatic, Normocephalic  EYES: EOMI, PERRLA, conjunctiva and sclera clear  ENMT: Moist mucous membranes, Good dentition, No lesions  NECK: Supple, No JVD, Normal thyroid  NERVOUS SYSTEM:  Alert & Oriented X3, Good concentration; Motor Strength 5/5 B/L upper and lower extremities; DTRs 2+ intact and symmetric  CHEST/LUNG: Clear to auscultation bilaterally; No rales, rhonchi, wheezing, or rubs  HEART: Regular rate and rhythm; No murmurs, rubs, or gallops  ABDOMEN: Soft, Nontender, Nondistended; Bowel sounds present  EXTREMITIES:  2+ Peripheral Pulses, No clubbing, cyanosis, or edema  LYMPH: No lymphadenopathy noted  SKIN: No rashes or lesions    Consultant(s) Notes Reviewed:  [x ] YES  [ ] NO  Care Discussed with Consultants/Other Providers [ x] YES  [ ] NO    LABS:                        10.8   18.30 )-----------( 314      ( 24 Jul 2020 10:08 )             34.9     07-24    142  |  108<H>  |  37<H>  ----------------------------<  101<H>  4.0   |  23  |  1.14    Ca    10.0      24 Jul 2020 10:08    TPro  5.8<L>  /  Alb  3.4  /  TBili  0.2  /  DBili  x   /  AST  17  /  ALT  11  /  AlkPhos  54  07-24    PT/INR - ( 24 Jul 2020 10:08 )   PT: 11.1 SEC;   INR: 0.97          PTT - ( 24 Jul 2020 10:08 )  PTT:36.2 SEC      CAPILLARY BLOOD GLUCOSE          RADIOLOGY & ADDITIONAL TESTS:    Imaging Personally Reviewed:  [ ] YES  [ ] NO Jacob Madden  PGY1/Internal Medicine  [id number]    SAMARA REN  89y  Female      Patient is a 89y old  Female who presents with a chief complaint of RLE US w/ DVT (24 Jul 2020 12:48)      INTERVAL HPI/OVERNIGHT EVENTS: No events overnight. Mild RLE pain but received no medications. Denies fevers/chills/CP/SOB/N/V.     REVIEW OF SYSTEMS:  CONSTITUTIONAL: No weakness, fevers or chills  EYES/ENT: No visual changes;  No vertigo or throat pain   NECK: No pain or stiffness  RESPIRATORY: No cough, wheezing, hemoptysis; No shortness of breath  CARDIOVASCULAR: No chest pain or palpitations  GASTROINTESTINAL: No abdominal or epigastric pain. No nausea, vomiting, or hematemesis; No diarrhea or constipation. No melena or hematochezia.  GENITOURINARY: No dysuria, frequency or hematuria  NEUROLOGICAL: No numbness or weakness  SKIN: No itching, rashes      Vital Signs Last 24 Hrs  T(C): 36.8 (25 Jul 2020 05:32), Max: 37 (24 Jul 2020 11:57)  T(F): 98.2 (25 Jul 2020 05:32), Max: 98.6 (24 Jul 2020 11:57)  HR: 83 (25 Jul 2020 05:32) (83 - 94)  BP: 154/66 (25 Jul 2020 05:32) (142/86 - 157/74)  BP(mean): --  RR: 18 (25 Jul 2020 05:32) (18 - 18)  SpO2: 95% (25 Jul 2020 05:32) (95% - 100%)    PHYSICAL EXAM:  GENERAL: NAD, well-groomed, well-developed  HEAD:  Atraumatic, Normocephalic  EYES: EOMI, PERRLA, conjunctiva and sclera clear  ENMT: Moist mucous membranes, Good dentition, No lesions  NECK: Supple, No JVD, Normal thyroid  NERVOUS SYSTEM:  Alert & Oriented X3, Good concentration; Motor Strength 5/5 B/L upper and lower extremities; DTRs 2+ intact and symmetric  CHEST/LUNG: Clear to auscultation bilaterally; No rales, rhonchi, wheezing, or rubs  HEART: Regular rate and rhythm; No murmurs, rubs, or gallops  ABDOMEN: Soft, Nontender, Nondistended; Bowel sounds present  EXTREMITIES:  2+ Peripheral Pulses, No clubbing, cyanosis, or edema  LYMPH: No lymphadenopathy noted  SKIN: No rashes or lesions    Consultant(s) Notes Reviewed:  [x ] YES  [ ] NO  Care Discussed with Consultants/Other Providers [ x] YES  [ ] NO    LABS:                        10.0   11.51 )-----------( 285      ( 25 Jul 2020 05:30 )             32.7   07-25    142  |  108<H>  |  38<H>  ----------------------------<  102<H>  4.2   |  25  |  1.20    Ca    9.3      25 Jul 2020 05:30  Phos  2.7     07-25  Mg     1.7     07-25    TPro  5.3<L>  /  Alb  3.1<L>  /  TBili  0.2  /  DBili  x   /  AST  12  /  ALT  10  /  AlkPhos  50  07-25    PT/INR - ( 24 Jul 2020 10:08 )   PT: 11.1 SEC;   INR: 0.97          PTT - ( 24 Jul 2020 10:08 )  PTT:36.2 SEC      CAPILLARY BLOOD GLUCOSE          RADIOLOGY & ADDITIONAL TESTS:    Imaging Personally Reviewed:  [ ] YES  [ ] NO Jacob Madden  PGY1/Internal Medicine  [id number]    SAMARA REN  89y  Female      Patient is a 89y old  Female who presents with a chief complaint of RLE US w/ DVT (24 Jul 2020 12:48)      INTERVAL HPI/OVERNIGHT EVENTS: No events overnight. Mild RLE pain but received no medications. Denies fevers/chills/CP/SOB/N/V.     REVIEW OF SYSTEMS:  CONSTITUTIONAL: No weakness, fevers or chills  EYES/ENT: No visual changes;  No vertigo or throat pain   NECK: No pain or stiffness  RESPIRATORY: No cough, wheezing, hemoptysis; No shortness of breath  CARDIOVASCULAR: No chest pain or palpitations  GASTROINTESTINAL: No abdominal or epigastric pain. No nausea, vomiting, or hematemesis; No diarrhea or constipation. No melena or hematochezia.  GENITOURINARY: No dysuria, frequency or hematuria  NEUROLOGICAL: No numbness or weakness  SKIN: No itching, rashes      Vital Signs Last 24 Hrs  T(C): 36.8 (25 Jul 2020 05:32), Max: 37 (24 Jul 2020 11:57)  T(F): 98.2 (25 Jul 2020 05:32), Max: 98.6 (24 Jul 2020 11:57)  HR: 83 (25 Jul 2020 05:32) (83 - 94)  BP: 154/66 (25 Jul 2020 05:32) (142/86 - 157/74)  BP(mean): --  RR: 18 (25 Jul 2020 05:32) (18 - 18)  SpO2: 95% (25 Jul 2020 05:32) (95% - 100%)    PHYSICAL EXAM:  GENERAL: NAD, well-groomed, well-developed  HEAD:  Atraumatic, Normocephalic  EYES: EOMI, PERRLA, conjunctiva and sclera clear  ENMT: Moist mucous membranes, Good dentition, No lesions  NECK: Supple, No JVD, Normal thyroid  NERVOUS SYSTEM:  Alert & Oriented X3, Good concentration; Motor Strength 5/5 B/L upper and lower extremities; DTRs 2+ intact and symmetric  CHEST/LUNG: Clear to auscultation bilaterally; No rales, rhonchi, wheezing, or rubs  HEART: Regular rate and rhythm; No murmurs, rubs, or gallops  ABDOMEN: Soft, Nontender, Nondistended; Bowel sounds present  EXTREMITIES:  R. lower extremity edematous and slightly erythematous; mild TTP  LYMPH: No lymphadenopathy noted  SKIN: No rashes or lesions    Consultant(s) Notes Reviewed:  [x ] YES  [ ] NO  Care Discussed with Consultants/Other Providers [ x] YES  [ ] NO    LABS:                        10.0   11.51 )-----------( 285      ( 25 Jul 2020 05:30 )             32.7   07-25    142  |  108<H>  |  38<H>  ----------------------------<  102<H>  4.2   |  25  |  1.20    Ca    9.3      25 Jul 2020 05:30  Phos  2.7     07-25  Mg     1.7     07-25    TPro  5.3<L>  /  Alb  3.1<L>  /  TBili  0.2  /  DBili  x   /  AST  12  /  ALT  10  /  AlkPhos  50  07-25    PT/INR - ( 24 Jul 2020 10:08 )   PT: 11.1 SEC;   INR: 0.97          PTT - ( 24 Jul 2020 10:08 )  PTT:36.2 SEC      CAPILLARY BLOOD GLUCOSE          RADIOLOGY & ADDITIONAL TESTS:    Imaging Personally Reviewed:  [ ] YES  [ ] NO

## 2020-07-25 NOTE — PHYSICAL THERAPY INITIAL EVALUATION ADULT - PATIENT PROFILE REVIEW, REHAB EVAL
yes/PT orders received: ambulate with assistance. Consult with RN Gissell SMITH, pt may participate in PT evaluation.

## 2020-07-25 NOTE — PROGRESS NOTE ADULT - PROBLEM SELECTOR PLAN 1
- Pt presented w/ RLE DVT for which she will be placed on oral anticoagulant (Eliquis 10bid for 7 days followed by 5mg bid for a total of 3 months)  - IVC filter not considered at this moment given lack of any life-threatening   bleeding in the past and lack of indication  - Discussed w/ Dr. Lynn

## 2020-07-25 NOTE — PHYSICAL THERAPY INITIAL EVALUATION ADULT - GAIT DEVIATIONS NOTED, PT EVAL
decreased priti/increased time in double stance/decreased step length/decreased stride length/decreased weight-shifting ability

## 2020-07-25 NOTE — PHYSICAL THERAPY INITIAL EVALUATION ADULT - ADDITIONAL COMMENTS
Patient reports she lives alone in an apartment, +elevator access. Patient reports she has a home health aide whom "is pretty much always there." Patient ambulated with a rolling walker prior to admission.    Patient was left sitting in chair, all lines/tubes intact and call gonzalez within reach, EMELY treviño

## 2020-07-25 NOTE — PHYSICAL THERAPY INITIAL EVALUATION ADULT - PERTINENT HX OF CURRENT PROBLEM, REHAB EVAL
Patient is a 89 year old male admitted to [facility] on 7/24 from rehab with DVT in Right LE. PMH: polymyalgia rheumatica on prednisone, chronic hypovolemic hyponatremia.

## 2020-07-26 DIAGNOSIS — N17.9 ACUTE KIDNEY FAILURE, UNSPECIFIED: ICD-10-CM

## 2020-07-26 LAB
ANION GAP SERPL CALC-SCNC: 12 MMO/L — SIGNIFICANT CHANGE UP (ref 7–14)
BUN SERPL-MCNC: 44 MG/DL — HIGH (ref 7–23)
CALCIUM SERPL-MCNC: 9.9 MG/DL — SIGNIFICANT CHANGE UP (ref 8.4–10.5)
CHLORIDE SERPL-SCNC: 106 MMOL/L — SIGNIFICANT CHANGE UP (ref 98–107)
CO2 SERPL-SCNC: 25 MMOL/L — SIGNIFICANT CHANGE UP (ref 22–31)
CREAT SERPL-MCNC: 1.36 MG/DL — HIGH (ref 0.5–1.3)
GLUCOSE SERPL-MCNC: 114 MG/DL — HIGH (ref 70–99)
HCT VFR BLD CALC: 35.4 % — SIGNIFICANT CHANGE UP (ref 34.5–45)
HGB BLD-MCNC: 10.7 G/DL — LOW (ref 11.5–15.5)
MAGNESIUM SERPL-MCNC: 1.8 MG/DL — SIGNIFICANT CHANGE UP (ref 1.6–2.6)
MCHC RBC-ENTMCNC: 28.8 PG — SIGNIFICANT CHANGE UP (ref 27–34)
MCHC RBC-ENTMCNC: 30.2 % — LOW (ref 32–36)
MCV RBC AUTO: 95.2 FL — SIGNIFICANT CHANGE UP (ref 80–100)
NRBC # FLD: 0 K/UL — SIGNIFICANT CHANGE UP (ref 0–0)
PHOSPHATE SERPL-MCNC: 2.8 MG/DL — SIGNIFICANT CHANGE UP (ref 2.5–4.5)
PLATELET # BLD AUTO: 297 K/UL — SIGNIFICANT CHANGE UP (ref 150–400)
PMV BLD: 10.2 FL — SIGNIFICANT CHANGE UP (ref 7–13)
POTASSIUM SERPL-MCNC: 4 MMOL/L — SIGNIFICANT CHANGE UP (ref 3.5–5.3)
POTASSIUM SERPL-SCNC: 4 MMOL/L — SIGNIFICANT CHANGE UP (ref 3.5–5.3)
RBC # BLD: 3.72 M/UL — LOW (ref 3.8–5.2)
RBC # FLD: 15 % — HIGH (ref 10.3–14.5)
SODIUM SERPL-SCNC: 143 MMOL/L — SIGNIFICANT CHANGE UP (ref 135–145)
WBC # BLD: 12.21 K/UL — HIGH (ref 3.8–10.5)
WBC # FLD AUTO: 12.21 K/UL — HIGH (ref 3.8–10.5)

## 2020-07-26 PROCEDURE — 99232 SBSQ HOSP IP/OBS MODERATE 35: CPT | Mod: GC

## 2020-07-26 RX ORDER — ACETAMINOPHEN 500 MG
650 TABLET ORAL EVERY 6 HOURS
Refills: 0 | Status: DISCONTINUED | OUTPATIENT
Start: 2020-07-26 | End: 2020-07-27

## 2020-07-26 RX ADMIN — Medication 5 MILLIGRAM(S): at 13:25

## 2020-07-26 RX ADMIN — APIXABAN 10 MILLIGRAM(S): 2.5 TABLET, FILM COATED ORAL at 17:48

## 2020-07-26 RX ADMIN — LOSARTAN POTASSIUM 50 MILLIGRAM(S): 100 TABLET, FILM COATED ORAL at 06:51

## 2020-07-26 RX ADMIN — Medication 5 MILLIGRAM(S): at 06:51

## 2020-07-26 RX ADMIN — Medication 650 MILLIGRAM(S): at 15:10

## 2020-07-26 RX ADMIN — Medication 5 MILLIGRAM(S): at 22:06

## 2020-07-26 RX ADMIN — Medication 650 MILLIGRAM(S): at 14:36

## 2020-07-26 RX ADMIN — APIXABAN 10 MILLIGRAM(S): 2.5 TABLET, FILM COATED ORAL at 06:51

## 2020-07-26 RX ADMIN — PANTOPRAZOLE SODIUM 40 MILLIGRAM(S): 20 TABLET, DELAYED RELEASE ORAL at 06:51

## 2020-07-26 NOTE — PROGRESS NOTE ADULT - PROBLEM SELECTOR PLAN 5
DVT ppx: Eliquis  Diet: DASH  Dispo: Back to rehab after PT consult Patient will slightly elevated creatinine of 1.36. Baseline appeared to be 1.1-1.2  - likely pre-renal vs medication induced  - will encourage PO intake of fluids and monitor Cr

## 2020-07-26 NOTE — PROGRESS NOTE ADULT - SUBJECTIVE AND OBJECTIVE BOX
PROGRESS NOTE:   Authored By: Tita Uribe MD     PGY-2, Internal Medicine  Pager: [phone], [phone]    Patient is a 89y old  Female who presents with a chief complaint of RLE US w/ DVT (25 Jul 2020 06:45)    OVERNIGHT EVENTS: No acute events overnight    SUBJECTIVE: Pt seen and examined at bedside this morning.     ADDITIONAL REVIEW OF SYSTEMS:    MEDICATIONS  (STANDING):  apixaban 10 milliGRAM(s) Oral every 12 hours  losartan 50 milliGRAM(s) Oral daily  pantoprazole    Tablet 40 milliGRAM(s) Oral before breakfast  predniSONE   Tablet 5 milliGRAM(s) Oral three times a day  psyllium Powder 1 Packet(s) Oral daily    MEDICATIONS  (PRN):    CAPILLARY BLOOD GLUCOSE    I&O's Summary    PHYSICAL EXAM:  Vital Signs Last 24 Hrs  T(C): 36.3 (25 Jul 2020 20:43), Max: 37.1 (25 Jul 2020 12:53)  T(F): 97.3 (25 Jul 2020 20:43), Max: 98.8 (25 Jul 2020 12:53)  HR: 75 (25 Jul 2020 20:43) (75 - 105)  BP: 149/63 (25 Jul 2020 20:43) (138/53 - 149/63)  BP(mean): --  RR: 19 (25 Jul 2020 20:43) (18 - 19)  SpO2: 99% (25 Jul 2020 20:43) (97% - 99%)    CONSTITUTIONAL: NAD, well-developed  HEENT: NC/AT, EOMI  RESPIRATORY: No increased work of breathing, CTAB, no wheezes or crackles appreciated  CARDIOVASCULAR: RRR, S1 and S2 present, no m/r/g  ABDOMEN: soft, NT, ND, bowel sounds present  EXTREMITIES: RLE with mild tenderness and erythema  MUSCULOSKELETAL: no joint swelling or tenderness to palpation  NEURO: A&Ox3, moving all extremities    LABS:                        10.0   11.51 )-----------( 285      ( 25 Jul 2020 05:30 )             32.7     07-25    142  |  108<H>  |  38<H>  ----------------------------<  102<H>  4.2   |  25  |  1.20    Ca    9.3      25 Jul 2020 05:30  Phos  2.7     07-25  Mg     1.7     07-25    TPro  5.3<L>  /  Alb  3.1<L>  /  TBili  0.2  /  DBili  x   /  AST  12  /  ALT  10  /  AlkPhos  50  07-25    PT/INR - ( 24 Jul 2020 10:08 )   PT: 11.1 SEC;   INR: 0.97        PTT - ( 24 Jul 2020 10:08 )  PTT:36.2 SEC    RADIOLOGY & ADDITIONAL TESTS:  < from: US Duplex Venous Lower Ext Ltd, Right (07.24.20 @ 10:50) >  IMPRESSION:   Right deep venous thrombosis extending from the right femoral vein to the calf veins, corresponding with known deep venous thrombosis.    Acute deep venous thrombosis: above and below the knee.    < end of copied text >      Results Reviewed:   Imaging Personally Reviewed:  Electrocardiogram Personally Reviewed:    COORDINATION OF CARE:  Care Discussed with Consultants/Other Providers [Y/N]:  Prior or Outpatient Records Reviewed [Y/N]: PROGRESS NOTE:   Authored By: Tita Uribe MD     PGY-2, Internal Medicine  Pager: [phone], FKE [phone]    Patient is a 89y old  Female who presents with a chief complaint of RLE US w/ DVT (25 Jul 2020 06:45)    OVERNIGHT EVENTS: No acute events overnight.    SUBJECTIVE: Pt seen and examined at bedside this morning. States that she feels well. Endorses some blood noted on toilet paper after BM last night but states that is normal for her. Denies any blood in the stool. Denies any CP, SOB, lightheadedness or abd pain.    ADDITIONAL REVIEW OF SYSTEMS:    MEDICATIONS  (STANDING):  apixaban 10 milliGRAM(s) Oral every 12 hours  losartan 50 milliGRAM(s) Oral daily  pantoprazole    Tablet 40 milliGRAM(s) Oral before breakfast  predniSONE   Tablet 5 milliGRAM(s) Oral three times a day  psyllium Powder 1 Packet(s) Oral daily    MEDICATIONS  (PRN):    CAPILLARY BLOOD GLUCOSE    I&O's Summary    PHYSICAL EXAM:  Vital Signs Last 24 Hrs  T(C): 36.3 (25 Jul 2020 20:43), Max: 37.1 (25 Jul 2020 12:53)  T(F): 97.3 (25 Jul 2020 20:43), Max: 98.8 (25 Jul 2020 12:53)  HR: 75 (25 Jul 2020 20:43) (75 - 105)  BP: 149/63 (25 Jul 2020 20:43) (138/53 - 149/63)  BP(mean): --  RR: 19 (25 Jul 2020 20:43) (18 - 19)  SpO2: 99% (25 Jul 2020 20:43) (97% - 99%)    CONSTITUTIONAL: NAD, well-developed  HEENT: NC/AT, EOMI  RESPIRATORY: No increased work of breathing, CTAB, no wheezes or crackles appreciated  CARDIOVASCULAR: RRR, S1 and S2 present, no m/r/g  ABDOMEN: soft, NT, ND, bowel sounds present  EXTREMITIES: RLE with mild tenderness and erythema  MUSCULOSKELETAL: no joint swelling or tenderness to palpation  NEURO: A&Ox3, moving all extremities    LABS:                        10.0   11.51 )-----------( 285      ( 25 Jul 2020 05:30 )             32.7     07-25    142  |  108<H>  |  38<H>  ----------------------------<  102<H>  4.2   |  25  |  1.20    Ca    9.3      25 Jul 2020 05:30  Phos  2.7     07-25  Mg     1.7     07-25    TPro  5.3<L>  /  Alb  3.1<L>  /  TBili  0.2  /  DBili  x   /  AST  12  /  ALT  10  /  AlkPhos  50  07-25    PT/INR - ( 24 Jul 2020 10:08 )   PT: 11.1 SEC;   INR: 0.97        PTT - ( 24 Jul 2020 10:08 )  PTT:36.2 SEC    RADIOLOGY & ADDITIONAL TESTS:  < from: US Duplex Venous Lower Ext Ltd, Right (07.24.20 @ 10:50) >  IMPRESSION:   Right deep venous thrombosis extending from the right femoral vein to the calf veins, corresponding with known deep venous thrombosis.    Acute deep venous thrombosis: above and below the knee.    < end of copied text >    Results Reviewed:   Imaging Personally Reviewed:  Electrocardiogram Personally Reviewed:    COORDINATION OF CARE:  Care Discussed with Consultants/Other Providers [Y/N]:  Prior or Outpatient Records Reviewed [Y/N]:

## 2020-07-26 NOTE — PROGRESS NOTE ADULT - PROBLEM SELECTOR PLAN 1
- Pt presented w/ RLE DVT for which she will be placed on oral anticoagulant (Eliquis 10bid for 7 days followed by 5mg bid for a total of 3 months)  - IVC filter not considered at this moment given lack of any life-threatening   bleeding in the past and lack of indication  - Discussed w/ Dr. Lynn  - c/w Eliquis 10mg BID while inpatient, to be continued on d/c - Pt presented w/ RLE DVT for which she will be placed on oral anticoagulant (Eliquis 10 mg bid for 7 days followed by 5mg bid for a total of 3 months)  - IVC filter not considered at this moment given lack of any life-threatening   bleeding in the past and lack of indication  - Discussed w/ Dr. Lynn  - c/w Eliquis 10mg BID while inpatient, to be continued on d/c

## 2020-07-27 ENCOUNTER — TRANSCRIPTION ENCOUNTER (OUTPATIENT)
Age: 85
End: 2020-07-27

## 2020-07-27 VITALS — HEART RATE: 87 BPM | SYSTOLIC BLOOD PRESSURE: 149 MMHG | DIASTOLIC BLOOD PRESSURE: 77 MMHG

## 2020-07-27 LAB
ANION GAP SERPL CALC-SCNC: 9 MMO/L — SIGNIFICANT CHANGE UP (ref 7–14)
BUN SERPL-MCNC: 39 MG/DL — HIGH (ref 7–23)
CALCIUM SERPL-MCNC: 9.5 MG/DL — SIGNIFICANT CHANGE UP (ref 8.4–10.5)
CHLORIDE SERPL-SCNC: 106 MMOL/L — SIGNIFICANT CHANGE UP (ref 98–107)
CO2 SERPL-SCNC: 23 MMOL/L — SIGNIFICANT CHANGE UP (ref 22–31)
CREAT SERPL-MCNC: 1.14 MG/DL — SIGNIFICANT CHANGE UP (ref 0.5–1.3)
GLUCOSE SERPL-MCNC: 108 MG/DL — HIGH (ref 70–99)
HCT VFR BLD CALC: 33.2 % — LOW (ref 34.5–45)
HGB BLD-MCNC: 10 G/DL — LOW (ref 11.5–15.5)
MAGNESIUM SERPL-MCNC: 1.8 MG/DL — SIGNIFICANT CHANGE UP (ref 1.6–2.6)
MCHC RBC-ENTMCNC: 28.3 PG — SIGNIFICANT CHANGE UP (ref 27–34)
MCHC RBC-ENTMCNC: 30.1 % — LOW (ref 32–36)
MCV RBC AUTO: 94.1 FL — SIGNIFICANT CHANGE UP (ref 80–100)
NRBC # FLD: 0 K/UL — SIGNIFICANT CHANGE UP (ref 0–0)
PHOSPHATE SERPL-MCNC: 2.8 MG/DL — SIGNIFICANT CHANGE UP (ref 2.5–4.5)
PLATELET # BLD AUTO: 290 K/UL — SIGNIFICANT CHANGE UP (ref 150–400)
PMV BLD: 9.9 FL — SIGNIFICANT CHANGE UP (ref 7–13)
POTASSIUM SERPL-MCNC: 4.3 MMOL/L — SIGNIFICANT CHANGE UP (ref 3.5–5.3)
POTASSIUM SERPL-SCNC: 4.3 MMOL/L — SIGNIFICANT CHANGE UP (ref 3.5–5.3)
RBC # BLD: 3.53 M/UL — LOW (ref 3.8–5.2)
RBC # FLD: 14.8 % — HIGH (ref 10.3–14.5)
SODIUM SERPL-SCNC: 138 MMOL/L — SIGNIFICANT CHANGE UP (ref 135–145)
WBC # BLD: 12.34 K/UL — HIGH (ref 3.8–10.5)
WBC # FLD AUTO: 12.34 K/UL — HIGH (ref 3.8–10.5)

## 2020-07-27 PROCEDURE — 99239 HOSP IP/OBS DSCHRG MGMT >30: CPT

## 2020-07-27 RX ORDER — APIXABAN 2.5 MG/1
1 TABLET, FILM COATED ORAL
Qty: 60 | Refills: 0
Start: 2020-07-27

## 2020-07-27 RX ORDER — APIXABAN 2.5 MG/1
2 TABLET, FILM COATED ORAL
Qty: 12 | Refills: 0
Start: 2020-07-27 | End: 2020-07-29

## 2020-07-27 RX ORDER — LOSARTAN POTASSIUM 100 MG/1
50 TABLET, FILM COATED ORAL ONCE
Refills: 0 | Status: DISCONTINUED | OUTPATIENT
Start: 2020-07-27 | End: 2020-07-27

## 2020-07-27 RX ORDER — LOSARTAN POTASSIUM 100 MG/1
50 TABLET, FILM COATED ORAL DAILY
Refills: 0 | Status: DISCONTINUED | OUTPATIENT
Start: 2020-07-27 | End: 2020-07-27

## 2020-07-27 RX ADMIN — Medication 5 MILLIGRAM(S): at 05:57

## 2020-07-27 RX ADMIN — PANTOPRAZOLE SODIUM 40 MILLIGRAM(S): 20 TABLET, DELAYED RELEASE ORAL at 06:00

## 2020-07-27 RX ADMIN — Medication 1 PACKET(S): at 13:07

## 2020-07-27 RX ADMIN — Medication 5 MILLIGRAM(S): at 13:07

## 2020-07-27 RX ADMIN — LOSARTAN POTASSIUM 50 MILLIGRAM(S): 100 TABLET, FILM COATED ORAL at 13:06

## 2020-07-27 RX ADMIN — LOSARTAN POTASSIUM 50 MILLIGRAM(S): 100 TABLET, FILM COATED ORAL at 05:57

## 2020-07-27 RX ADMIN — APIXABAN 10 MILLIGRAM(S): 2.5 TABLET, FILM COATED ORAL at 05:57

## 2020-07-27 RX ADMIN — APIXABAN 10 MILLIGRAM(S): 2.5 TABLET, FILM COATED ORAL at 17:23

## 2020-07-27 NOTE — DISCHARGE NOTE PROVIDER - HOSPITAL COURSE
HPI    89yo F pmhx polymyalgia rheumatica on prednisone, chronic hypovolemic hyponatremia here w/ DVT in RLE.  RLE swelling found in rehab, found to have an acute DVT of right popliteal and posterior tibial veins with loss of normal flow, compressibility and augmentation which was not present on prior exam on 7/11. Given lovenox 1m/kg and sent to hospitalist for admission for IVC filter placement.  Pt has no complaints at this time.  Denies chest pain, shortness of breath.  Reports intermittent pain to RLE but non currently.        ED:    VS T98.6, HR 87, /86, RR18, SpO2 100%        Hospital Course    Patient was admitted for DVT management. After discussion with Dr. Kohler and Dr. Lynn, decision was made to treat DVT with medical anticoagulation. Patient was started on eliquis and tolerated it well. Had normal stools in the hospital without blood or melena. Patient was discharged to home with home health aide.

## 2020-07-27 NOTE — DISCHARGE NOTE PROVIDER - CARE PROVIDER_API CALL
Rene Saucedo)  Internal Medicine  [address] [place] [address]  Phone: [phone]  Fax: [phone]  Follow Up Time: 2 weeks Rene Saucedo)  Internal Medicine  [address] [place] [id number]  Phone: [phone]  Fax: [phone]  Follow Up Time: 2 weeks    Lloyd Lynn  COLON/RECTAL SURGERY  [address] [place] [id number]  Phone: [phone]  Fax: [phone]  Follow Up Time:

## 2020-07-27 NOTE — PROGRESS NOTE ADULT - PROBLEM SELECTOR PLAN 2
- c/w home medication of prednisone 5mg tid  - protonix 40 oral qd as ppx for GI bleed

## 2020-07-27 NOTE — PROGRESS NOTE ADULT - PROBLEM SELECTOR PLAN 6
DVT ppx: Eliquis  Diet: DASH  Dispo: Back to rehab after PT consult
DVT ppx: Eliquis  Diet: DASH  Dispo: Back to rehab after PT consult

## 2020-07-27 NOTE — DISCHARGE NOTE PROVIDER - NSDCCPCAREPLAN_GEN_ALL_CORE_FT
PRINCIPAL DISCHARGE DIAGNOSIS  Diagnosis: Acute deep vein thrombosis (DVT) of popliteal vein of right lower extremity  Assessment and Plan of Treatment: You have a blood clot in your leg. You were started on anticoagulation (eliquis) for this clot. You will take this medication for the next 3 months. Please take eliquis 10mg, twice a day for the next 3 days. Then you will take eliquis 5mg, twice a day for the next 3 months. If you develope shortness of breath, chest pain or lightheadedness, please see your medical provider. Please call Dr. Saucedo's office to make a follow up appointment. PRINCIPAL DISCHARGE DIAGNOSIS  Diagnosis: Acute deep vein thrombosis (DVT) of popliteal vein of right lower extremity  Assessment and Plan of Treatment: You have a blood clot in your leg. You were started on anticoagulation (eliquis) for this clot. You will take this medication for the next 3 months. Please take eliquis 10mg, twice a day for the next 3 days. Then you will take eliquis 5mg, twice a day for the next 3 months. If you develop shortness of breath, chest pain or lightheadedness, please see your medical provider. If you develop blood in stool or have black stools please see a medical provider.  Please call Dr. Saucedo's office to make a follow up appointment ([phone]) PRINCIPAL DISCHARGE DIAGNOSIS  Diagnosis: Acute deep vein thrombosis (DVT) of popliteal vein of right lower extremity  Assessment and Plan of Treatment: You have a blood clot in your leg. You were started on anticoagulation (eliquis) for this clot. You will take this medication for the next 3 months. Please take eliquis 10mg, twice a day for the next 3 days. Then you will take eliquis 5mg, twice a day for the next 3 months. If you develop shortness of breath, chest pain or lightheadedness, please see your medical provider. If you develop blood in stool or have black stools please see a medical provider.  Please call Dr. Saucedo's office to make a follow up appointment ([phone])

## 2020-07-27 NOTE — PROGRESS NOTE ADULT - ATTENDING COMMENTS
Patient seen and examined by myself , case discussed  with resident ,agree with the above finding and plan  Pt sent from rehab due to RLE swelling. Pt denies any pain, and had continued to walk with walker as part of PT. US reveals acute RLE DVT above and below knee. First lifetime VTE per pt. Suspect DOAC for 3-6 months with surveillance US would be safest option; despite history of hemorrhoids, it does not appear the pt has ever had a major bleed. IVC filter would confer the usual procedural risks, and could also be thrombogenic itself.   pt started on Eliquis , tolerating it well      pt will need PT consult in anticipation of returning to rehab.  case d/w pt  at bedside
Patient seen and examined by myself , case discussed  with resident ,agree with the above finding and plan  Pt sent from rehab due to RLE swelling. Pt denies any pain, and had continued to walk with walker as part of PT. US reveals acute RLE DVT above and below knee. First lifetime VTE per pt. Suspect DOAC for 3-6 months with surveillance US would be safest option; despite history of hemorrhoids, it does not appear the pt has ever had a major bleed. IVC filter would confer the usual procedural risks, and could also be thrombogenic itself.   pt started on Eliquis , tolerating it well      pt will need PT consult in anticipation of returning to rehab.  case d/w pt and daughter at bedside
Pt feeling well without complaint. Vitals stable, exam non-focal, ambulating >100 feet with walker, hgb stable. Plan discharge home with private live-in aide. Will discharge with rx for apixaban x 3 months. Personally referred patient to ACTS (anticoagulation) clinic for monitoring. Also at request of pt's son, I referred pt to Geriatrics to establish care with a new primary care provider, Dr. Rene Saucedo. Discussed all with pt's son Saurav who is in agreement with d/c plan. Time planning discharge 35 minutes.

## 2020-07-27 NOTE — PROGRESS NOTE ADULT - SUBJECTIVE AND OBJECTIVE BOX
SAMARA REN  89y  Female      Patient is a 89y old  Female who presents with a chief complaint of RLE US w/ DVT (26 Jul 2020 06:05)      INTERVAL HPI/OVERNIGHT EVENTS:  No overnight events. No SOB, CP.    REVIEW OF SYSTEMS:  CONSTITUTIONAL: No fever, weight loss, or fatigue  RESPIRATORY: No cough, wheezing, chills or hemoptysis; No shortness of breath  CARDIOVASCULAR: No chest pain, palpitations, dizziness, or leg swelling  GASTROINTESTINAL: No abdominal or epigastric pain. No nausea, vomiting, or hematemesis; No diarrhea or constipation. No melena or hematochezia.  SKIN: No rash  MUSCULOSKELETAL: No joint pain or swelling; No muscle, back, or extremity pain    FAMILY HISTORY:    T(C): 36.7 (07-27-20 @ 05:40), Max: 36.7 (07-26-20 @ 13:17)  HR: 83 (07-27-20 @ 06:04) (79 - 98)  BP: 166/82 (07-27-20 @ 06:04) (135/57 - 177/75)  RR: 18 (07-27-20 @ 06:04) (17 - 18)  SpO2: 98% (07-27-20 @ 06:04) (97% - 98%)  Wt(kg): --Vital Signs Last 24 Hrs  T(C): 36.7 (27 Jul 2020 05:40), Max: 36.7 (26 Jul 2020 13:17)  T(F): 98 (27 Jul 2020 05:40), Max: 98.1 (26 Jul 2020 13:17)  HR: 83 (27 Jul 2020 06:04) (79 - 98)  BP: 166/82 (27 Jul 2020 06:04) (135/57 - 177/75)  BP(mean): --  RR: 18 (27 Jul 2020 06:04) (17 - 18)  SpO2: 98% (27 Jul 2020 06:04) (97% - 98%)  No Known Allergies      PHYSICAL EXAM:  GENERAL: NAD, well-groomed, well-developed  HEAD:  Atraumatic, Normocephalic  EYES: EOMI, PERRLA, conjunctiva and sclera clear  ENMT: Moist mucous membranes, Good dentition, No lesions  NECK: Supple, No JVD, Normal thyroid  NERVOUS SYSTEM:  Alert & Oriented X3, Good concentration; Motor Strength 5/5 B/L upper and lower extremities;   CHEST/LUNG: Clear to auscultation  bilaterally; No rales, rhonchi, wheezing, or rubs  HEART: Regular rate and rhythm; No murmurs, rubs, or gallops  ABDOMEN: Soft, Nontender, Nondistended; Bowel sounds present  EXTREMITIES:  2+ Peripheral Pulses, No clubbing, cyanosis, or edema  LYMPH: No lymphadenopathy noted  SKIN: No rashes or lesions    Consultant(s) Notes Reviewed:  [x ] YES  [ ] NO  Care Discussed with Consultants/Other Providers [ x] YES  [ ] NO    LABS:                        10.0   12.34 )-----------( 290      ( 27 Jul 2020 05:35 )             33.2   07-27    138  |  106  |  39<H>  ----------------------------<  108<H>  4.3   |  23  |  1.14    Ca    9.5      27 Jul 2020 05:35  Phos  2.8     07-27  Mg     1.8     07-27        RADIOLOGY & ADDITIONAL TESTS:    Imaging Personally Reviewed:  [ ] YES  [ ] NO  acetaminophen   Tablet .. 650 milliGRAM(s) Oral every 6 hours PRN  apixaban 10 milliGRAM(s) Oral every 12 hours  losartan 50 milliGRAM(s) Oral daily  pantoprazole    Tablet 40 milliGRAM(s) Oral before breakfast  predniSONE   Tablet 5 milliGRAM(s) Oral three times a day  psyllium Powder 1 Packet(s) Oral daily      HEALTH ISSUES - PROBLEM Dx:  VALENTIN (acute kidney injury): VALENTIN (acute kidney injury)  Polymyalgia rheumatica: Polymyalgia rheumatica  Prophylactic measure: Prophylactic measure  Hemorrhoid: Hemorrhoid  Hypertension: Hypertension  Acute deep vein thrombosis (DVT) of popliteal vein of right lower extremity: Acute deep vein thrombosis (DVT) of popliteal vein of right lower extremity

## 2020-07-27 NOTE — DISCHARGE NOTE PROVIDER - PROVIDER TOKENS
PROVIDER:[TOKEN:[[id number]],FOLLOWUP:[2 weeks]] PROVIDER:[TOKEN:[[id number]],FOLLOWUP:[2 weeks]],PROVIDER:[TOKEN:[[id number]]]

## 2020-07-27 NOTE — PROGRESS NOTE ADULT - ASSESSMENT
89yo woman with PMH of polymyalgia rheumatica, hemorrhoids, and hyponatremia is presenting from her rehab center after a positive RLE US showing DVT extending from right femoral vein to calf veins likely 2/2 to prolonged immobilization.
89yo woman with PMH of polymyalgia rheumatica, hemorrhoids, and hyponatremia is presenting from her rehab center after a positive RLE US showing DVT extending from right femoral vein to calf veins likely 2/2 to prolonged immobilization.
89yo woman with PMH of polymyalgia rheumatica, hemorrhoids, and hyponatremia is presenting from her rehab center after a positive RLE US showing DVT extending from right femoral vein to calf veins likely 2/2 to prolonged immobilization.

## 2020-07-27 NOTE — PROGRESS NOTE ADULT - PROBLEM SELECTOR PLAN 3
- currently stable  - c/w metamucil to help reduce constipation to prevent worsening of hemorrhoids

## 2020-07-27 NOTE — PROGRESS NOTE ADULT - PROBLEM SELECTOR PROBLEM 1
Acute deep vein thrombosis (DVT) of popliteal vein of right lower extremity

## 2020-07-27 NOTE — PROGRESS NOTE ADULT - PROBLEM SELECTOR PLAN 1
- Pt presented w/ RLE DVT for which she will be placed on oral anticoagulant (Eliquis 10 mg bid for 7 days followed by 5mg bid for a total of 3 months)  - IVC filter not considered at this moment given lack of any life-threatening   bleeding in the past and lack of indication  - Discussed w/ Dr. Lynn  - c/w Eliquis 10mg BID while inpatient, to be continued on d/c

## 2020-07-27 NOTE — DISCHARGE NOTE PROVIDER - NSDCMRMEDTOKEN_GEN_ALL_CORE_FT
Artificial Tears ophthalmic solution: 1 drop(s) in each eye 4 times a day, As Needed   dipyridamole 25 mg oral tablet: 1 tab(s) orally 2 times a day  meclizine 12.5 mg oral tablet: 1 tab(s) orally 2 times a day  Metamucil 3.4 g/5.2 g oral powder for reconstitution: 12 gram(s) orally 2 times a day   predniSONE 5 mg oral tablet: 1 tab(s) orally 3 times a day  PreserVision AREDS 2 oral capsule: 1 cap(s) orally once a day   valsartan 160 mg oral tablet: 1 tab(s) orally once a day Artificial Tears ophthalmic solution: 1 drop(s) in each eye 4 times a day, As Needed   dipyridamole 25 mg oral tablet: 1 tab(s) orally 2 times a day  Eliquis 5 mg oral tablet: 2 tab(s) orally every 12 hours for the next 3 days  Eliquis 5 mg oral tablet: 1 tab(s) orally every 12 hours, after completing first 3 days of treatment  meclizine 12.5 mg oral tablet: 1 tab(s) orally 2 times a day  Metamucil 3.4 g/5.2 g oral powder for reconstitution: 12 gram(s) orally 2 times a day   predniSONE 5 mg oral tablet: 1 tab(s) orally 3 times a day  PreserVision AREDS 2 oral capsule: 1 cap(s) orally once a day   Rolling  walker : Rolling Walker  ICD-10 M62.81    Name: Pratima Moe  valsartan 160 mg oral tablet: 1 tab(s) orally once a day Artificial Tears ophthalmic solution: 1 drop(s) in each eye 4 times a day, As Needed   dipyridamole 25 mg oral tablet: 1 tab(s) orally 2 times a day  Eliquis 5 mg oral tablet: 2 tab(s) orally every 12 hours for the next 3 days  Eliquis 5 mg oral tablet: 1 tab(s) orally every 12 hours, after completing first 3 days of treatment  meclizine 12.5 mg oral tablet: 1 tab(s) orally 2 times a day  Metamucil 3.4 g/5.2 g oral powder for reconstitution: 12 gram(s) orally 2 times a day   predniSONE 5 mg oral tablet: 1 tab(s) orally 3 times a day  PreserVision AREDS 2 oral capsule: 1 cap(s) orally once a day   valsartan 160 mg oral tablet: 1 tab(s) orally once a day Artificial Tears ophthalmic solution: 1 drop(s) in each eye 4 times a day, As Needed   dipyridamole 25 mg oral tablet: 1 tab(s) orally 2 times a day  Eliquis 5 mg oral tablet: 2 tab(s) orally every 12 hours for the next 3 days  Eliquis 5 mg oral tablet: 1 tab(s) orally every 12 hours, after completing first 3 days of treatment  meclizine 12.5 mg oral tablet: 1 tab(s) orally 2 times a day  Metamucil 3.4 g/5.2 g oral powder for reconstitution: 12 gram(s) orally 2 times a day   predniSONE 5 mg oral tablet: 1 tab(s) orally 3 times a day  PreserVision AREDS 2 oral capsule: 1 cap(s) orally once a day   Merna Lynn: Merna Lynn  ICD-10: M62.81    Name: Pratima Moe    valsartan 160 mg oral tablet: 1 tab(s) orally once a day apixaban 5 mg oral tablet: 1 tab(s) orally every 12 hours, after treatment for the first 3 days.  Artificial Tears ophthalmic solution: 1 drop(s) in each eye 4 times a day, As Needed   dipyridamole 25 mg oral tablet: 1 tab(s) orally 2 times a day  Eliquis 5 mg oral tablet: 2 tab(s) orally every 12 hours for the next 3 days  meclizine 12.5 mg oral tablet: 1 tab(s) orally 2 times a day  Metamucil 3.4 g/5.2 g oral powder for reconstitution: 12 gram(s) orally 2 times a day   predniSONE 5 mg oral tablet: 1 tab(s) orally 3 times a day  PreserVision AREDS 2 oral capsule: 1 cap(s) orally once a day   Merna Lynn: Merna Lynn  ICD-10: M62.81    Name: Pratima Moe    valsartan 160 mg oral tablet: 1 tab(s) orally once a day

## 2020-07-27 NOTE — DISCHARGE NOTE NURSING/CASE MANAGEMENT/SOCIAL WORK - PATIENT PORTAL LINK FT
You can access the FollowMyHealth Patient Portal offered by [facility] by registering at the following website: [email or web address]. By joining [facility]’s FollowMyHealth portal, you will also be able to view your health information using other applications (apps) compatible with our system.

## 2020-07-29 PROBLEM — E87.1 HYPO-OSMOLALITY AND HYPONATREMIA: Chronic | Status: ACTIVE | Noted: 2020-07-24

## 2020-07-29 PROBLEM — K64.9 UNSPECIFIED HEMORRHOIDS: Chronic | Status: ACTIVE | Noted: 2020-07-24

## 2020-07-29 RX ORDER — IMIQUIMOD 50 MG/G
5 CREAM TOPICAL
Qty: 12 | Refills: 0 | Status: DISCONTINUED | COMMUNITY
Start: 2019-03-19 | End: 2020-07-29

## 2020-07-29 RX ORDER — CEPHALEXIN 500 MG/1
500 CAPSULE ORAL 3 TIMES DAILY
Qty: 15 | Refills: 2 | Status: DISCONTINUED | COMMUNITY
Start: 2020-06-17 | End: 2020-07-29

## 2020-07-29 RX ORDER — HYDROCORTISONE 25 MG/G
2.5 CREAM TOPICAL
Qty: 1 | Refills: 6 | Status: DISCONTINUED | COMMUNITY
Start: 2019-07-24 | End: 2020-07-29

## 2020-07-29 RX ORDER — LOPERAMIDE HYDROCHLORIDE 2 MG/1
2 CAPSULE ORAL DAILY
Qty: 20 | Refills: 0 | Status: DISCONTINUED | COMMUNITY
Start: 2020-05-26 | End: 2020-07-29

## 2020-07-29 RX ORDER — HYDROCHLOROTHIAZIDE 50 MG/1
50 TABLET ORAL DAILY
Qty: 90 | Refills: 3 | Status: DISCONTINUED | COMMUNITY
Start: 2019-04-26 | End: 2020-07-29

## 2020-07-29 RX ORDER — POTASSIUM CHLORIDE 750 MG/1
10 CAPSULE, EXTENDED RELEASE ORAL DAILY
Qty: 90 | Refills: 0 | Status: DISCONTINUED | COMMUNITY
Start: 2019-07-16 | End: 2020-07-29

## 2020-07-29 RX ORDER — GABAPENTIN 100 MG/1
100 CAPSULE ORAL
Qty: 60 | Refills: 5 | Status: DISCONTINUED | COMMUNITY
Start: 2020-03-10 | End: 2020-07-29

## 2020-08-04 ENCOUNTER — APPOINTMENT (OUTPATIENT)
Dept: COLORECTAL SURGERY | Facility: CLINIC | Age: 85
End: 2020-08-04
Payer: MEDICARE

## 2020-08-04 DIAGNOSIS — K62.5 HEMORRHAGE OF ANUS AND RECTUM: ICD-10-CM

## 2020-08-04 DIAGNOSIS — K64.9 UNSPECIFIED HEMORRHOIDS: ICD-10-CM

## 2020-08-04 PROCEDURE — 99213 OFFICE O/P EST LOW 20 MIN: CPT | Mod: 25

## 2020-08-04 PROCEDURE — 46600 DIAGNOSTIC ANOSCOPY SPX: CPT

## 2020-08-04 NOTE — HISTORY OF PRESENT ILLNESS
[FreeTextEntry1] : 89-year-old female with long history of bleeding internal hemorrhoids status post multiple rubber band ligations in the past. Patient reports restarting blood thinners over the last 3 months because of blood clot.  Since that time she has noted blood per rectum 2 times per week. Denies pain. Denies prolapse. No fevers or chills. Takes a small amount of fiber supplement daily. Otherwise without complaint no aggravating factors

## 2020-08-04 NOTE — ASSESSMENT
[FreeTextEntry1] : Bleeding internal hemorrhoids in the setting of anticoagulation for deep vein thrombosis\par -I recommend increased fiber supplementation, full teaspoon of fiber/Metamucil daily\par -Patient to start hydrocortisone cream twice daily to decrease inflammation\par -Patient to followup with primary care physician to discuss anticoagulation\par -Symptoms likely will improve off of anticoagulation. However, would consider sclerotherapy at anticoagulation cannot be discontinued\par -Patient to followup in 6 weeks

## 2020-08-10 ENCOUNTER — TRANSCRIPTION ENCOUNTER (OUTPATIENT)
Age: 85
End: 2020-08-10

## 2020-08-10 ENCOUNTER — APPOINTMENT (OUTPATIENT)
Dept: GERIATRICS | Facility: CLINIC | Age: 85
End: 2020-08-10
Payer: MEDICARE

## 2020-08-10 VITALS
TEMPERATURE: 97.2 F | HEART RATE: 93 BPM | DIASTOLIC BLOOD PRESSURE: 50 MMHG | SYSTOLIC BLOOD PRESSURE: 132 MMHG | RESPIRATION RATE: 15 BRPM | OXYGEN SATURATION: 97 % | HEIGHT: 59 IN | BODY MASS INDEX: 23.95 KG/M2 | WEIGHT: 118.8 LBS

## 2020-08-10 DIAGNOSIS — I73.9 PERIPHERAL VASCULAR DISEASE, UNSPECIFIED: ICD-10-CM

## 2020-08-10 DIAGNOSIS — R42 DIZZINESS AND GIDDINESS: ICD-10-CM

## 2020-08-10 PROCEDURE — 99205 OFFICE O/P NEW HI 60 MIN: CPT

## 2020-08-10 RX ORDER — AZELASTINE HYDROCHLORIDE 137 UG/1
0.1 SPRAY, METERED NASAL TWICE DAILY
Qty: 1 | Refills: 5 | Status: DISCONTINUED | COMMUNITY
Start: 2018-03-13 | End: 2020-08-10

## 2020-08-10 NOTE — DATA REVIEWED
[FreeTextEntry1] : Reviewed labs from recent admission:\par \par CBC: WBC slightly elevated to ~11, down from 18. \par BMP w/ Na normal, Cr ~1 at baseline, no significant electrolyte abnormalities\par

## 2020-08-10 NOTE — PHYSICAL EXAM
MRN-[id number]  cc: R side weakness    HPI: 80 yo F with PMH of HTN, DM, HLD presents to the [facility] ED as a stroke transfer. She initially presented to [facility]. Her daughter had noticed her unresponsive at 4 pm and not following commands. LKW 9:30 pm on 2/9. At baseline, she is independent and lives alone. At [facility], she was found to have right arm plegia and was globally aphasic. CT head at [place] revealed dense L MCA sign and acute L MCA infarction. CTA h/n revealed a L M1 occlusion. At [facility], her GCS fell from 8 to 4, and therefore the decision was made for her to be intubated.    NIHSS 19. MRS 0.    PAST MEDICAL & SURGICAL HISTORY:  Basal cell carcinoma: nose  Gallbladder polyp  Chronic bronchitis, simple  Dyslipidemia  Diabetes: fbs  104-124  Hypertension  S/P colonoscopy: 2013  S/P cataract extraction, unspecified laterality: bilateral  S/P hysterectomy with oophorectomy: due to myoma 1994  S/P breast biopsy, right: 1985    FAMILY HISTORY:  Family history of colon cancer (Sibling)  Family history of CVA  Family history of borderline diabetes mellitus    Social Hx:  Nonsmoker, no drug or alcohol use    Home Medications:  atorvastatin 20 mg oral tablet: 1 tab(s) orally once a day (at bedtime) (10 Feb 2020 17:25)  CalciuM: 1 tab(s) orally once a day (10 Feb 2020 18:36)  hydrochlorothiazide 25 mg oral tablet: 1 tab(s) orally once a day (10 Feb 2020 17:25)  metoprolol: 1 tab(s) orally once a day (10 Feb 2020 18:36)  repaglinide 1 mg oral tablet: 1 tab(s) orally 3 times a day (before meals) (10 Feb 2020 18:36)  valsartan 160 mg oral capsule: 1 tab(s) orally 2 times a day (10 Feb 2020 18:36)  Vitamin D3 2000 intl units (50 mcg) oral tablet: 1 tab(s) orally once a day (10 Feb 2020 18:36)    MEDICATIONS  (STANDING):  chlorhexidine 0.12% Liquid 15 milliLiter(s) Oral Mucosa every 12 hours    Allergies  No Known Allergies    REVIEW OF SYSTEMS    ROS: Pertinent positives in HPI, all other ROS were reviewed and are negative.      Vital Signs Last 24 Hrs  T(C): 37 (10 Feb 2020 21:19), Max: 37 (10 Feb 2020 21:19)  T(F): 98.6 (10 Feb 2020 21:19), Max: 98.6 (10 Feb 2020 21:19)  HR: 100 (10 Feb 2020 22:40) (90 - 120)  BP: 155/65 (10 Feb 2020 22:40) (155/65 - 256/119)  BP(mean): --  RR: 20 (10 Feb 2020 22:40) (14 - 30)  SpO2: 99% (10 Feb 2020 22:40) (94% - 99%)    GENERAL EXAM:  Constitutional: intubated and sedated  HEENT: PERRLA  Extremities: no edema, no cyanosis  Vascular: no carotid bruits  Musculoskeletal: no joint swelling/tenderness, no abnormal movements  Skin: no rashes    NEUROLOGICAL EXAM:  MS: intubated and sedated. eyes closed, and eyes do not open with verbal or noxious stimuli. does not follow any commands.   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  Motor: RUE trace movement to noxious stimuli. some effort against gravity in RLE. LUE and LLE strength tested limited by mental status, but briskly withdraws LUE and LLE to noxious stimuli.  Sensation: grimace to noxious stimuli present in b/l UE and LE.  Coordination: not tested  Gait:  not tested    Labs:   cbc                      14.5   14.45 )-----------( 206      ( 10 Feb 2020 17:46 )             45.5     [id number]    141  |  104  |  24<H>  ----------------------------<  210<H>  3.6   |  23  |  0.80    Ca    9.0      10 Feb 2020 17:46    TPro  8.0  /  Alb  4.1  /  TBili  0.6  /  DBili  x   /  AST  31  /  ALT  50  /  AlkPhos  84  02-10    CoagsPT/INR - ( 10 Feb 2020 17:46 )   PT: 11.7 sec;   INR: 1.05 ratio         PTT - ( 10 Feb 2020 17:46 )  PTT:28.2 sec    CardiacMarkersCARDIAC MARKERS ( 10 Feb 2020 17:46 )  .007 ng/mL / x     / x     / x     / x          LFTsLIVER FUNCTIONS - ( 10 Feb 2020 17:46 )  Alb: 4.1 g/dL / Pro: 8.0 g/dL / ALK PHOS: 84 U/L / ALT: 50 U/L DA / AST: 31 U/L / GGT: x           UA  CSF  Immunological Labs    Radiology:  -CT Head:    < from: CT Angio Head w/ IV Cont (02.10.20 @ 19:55) >  IMPRESSION:    Expanding zone of edema related to maturing infarct in the left MCA distribution as above    < end of copied text > [General Appearance - Alert] : alert [General Appearance - In No Acute Distress] : in no acute distress [General Appearance - Well-Appearing] : healthy appearing [PERRL With Normal Accommodation] : pupils were equal in size, round, and reactive to light [Sclera] : the sclera and conjunctiva were normal [Normal Oral Mucosa] : normal oral mucosa [Extraocular Movements] : extraocular movements were intact [No Oral Pallor] : no oral pallor [No Oral Cyanosis] : no oral cyanosis [Oropharynx] : The oropharynx was normal [Outer Ear] : the ears and nose were normal in appearance [Neck Cervical Mass (___cm)] : no neck mass was observed [Jugular Venous Distention Increased] : there was no jugular-venous distention [Neck Appearance] : the appearance of the neck was normal [Thyroid Diffuse Enlargement] : the thyroid was not enlarged [Thyroid Nodule] : there were no palpable thyroid nodules [Heart Rate And Rhythm] : heart rate was normal and rhythm regular [Auscultation Breath Sounds / Voice Sounds] : lungs were clear to auscultation bilaterally [Respiration, Rhythm And Depth] : normal respiratory rhythm and effort [Heart Sounds] : normal S1 and S2 [Murmurs] : no murmurs [Heart Sounds Gallop] : no gallops [Heart Sounds Pericardial Friction Rub] : no pericardial rub [Abdomen Soft] : soft [Bowel Sounds] : normal bowel sounds [Cervical Lymph Nodes Enlarged Posterior Bilaterally] : posterior cervical [Abdomen Mass (___ Cm)] : no abdominal mass palpated [Abdomen Tenderness] : non-tender [] : no hepato-splenomegaly [Cervical Lymph Nodes Enlarged Anterior Bilaterally] : anterior cervical [Musculoskeletal - Swelling] : no joint swelling seen [No CVA Tenderness] : no ~M costovertebral angle tenderness [Motor Exam] : the motor exam was normal [Sensation] : the sensory exam was normal to light touch and pinprick [Cranial Nerves] : cranial nerves 2-12 were intact [Oriented To Time, Place, And Person] : oriented to person, place, and time [No Focal Deficits] : no focal deficits [FreeTextEntry1] : somewhat depressed mood. she does ask to have statements repeated frequently.

## 2020-08-10 NOTE — HISTORY OF PRESENT ILLNESS
[1] : 1) Little interest or pleasure doing things for several days [0] : 2) Feeling down, depressed, or hopeless: Not at all [PHQ-2 Score ___] : PHQ-2 Score [unfilled] [FreeTextEntry1] : 89 year old woman w/ PMH PMR (on chronic steroids), internal hemorrhoids s/p previous banding, HTN, PAD s/p arterial bypass ~15 years ago, and recent admission for acute RLE DVT presents for initial visit. Patient was recently d/c from [facility] on 20.\par \par PCP: Dr. Albaro Kohler\par Colorectal Sx: Dr. Lloyd Lynn\par Vasc Sx: Dr. Chucky Jonas\par Ophthalmology: Dr. Morrison ([place]) - [phone]\par \par Patient has notably had a functional decline over the past few months. She reportedly had a mechanical fall in May and was noted to have decreased PO intake and diarrhea. She was hospitalized 2020 for hypovolemic hyponatremia (Na 108 on admission) suspected secondary to HCTZ use and subsequently d/c'ed to [facility]. During [facility] stay, patient admitted to [facility] on 20 for RLE swelling, found to have acute DVT of R popliteal and posterior tibial veins. After consultation w/ PCP and colorectal sx (given h/o rectal bleeding from hemorrhoids), decision made to start eliquis 5 mg q12h and avoid IVC filter. She was d/c home on 20 w/  HHA. From chart review and reported hx, there is no evidence of prior DVT. \par \par She was seen by Dr. Lynn for f/u on . Thus far she reportedly has not had evidence of rectal bleeding while on eliquis.\par \par Today, she reports her primary concern is difficulty walking. She has had functional decline in the past few months with hospitalization. She is now ambulating w/ rollator (without significant assistance). She reports RLE pain w/ ambulation, resolves at rest. Pain has been present for "a long time", similar to prior reports when she was evaluated by vasc Sx earlier this year. Pain is relatively unchanged. She reports no numbness, significant color change, or cold limb. She has persistent RLE, which is slightly improved recently (and chronic). She intermittently elevates her legs, which provides occasional relief. Does not take any pain medication (including tylenol). \par \par She ambulates occasionally around the house, but has not left her apartment since D/C from the hospital. She has a courtyard in her apartment complex, but has not been "motivated" to leave. Home PT saw her once, but were reportedly ineffective. She is not exercising regularly. \par \par PAD: Seen by vascular 2020. Noninvasive arterial physiological studies confirmed presence of moderate PAD in the right leg. Patient preferred conservative management. Remains on dipyridamole 25 BID, which she has been on for many years. No ASA/plavix/cilostazol. \par \par PMR: On prednisone 5 mg TID for many years. Reviewed CRP and ESR, have been WNL for the past 5 years. \par \par HTN: On valsartan 160 mg qd. Has stopped HCTZ since hospitalization for hyponatremia.\par \par Vertigo: No recent episodes. She continues to take meclizine even in the absence of symptoms. \par \par HCM:\par DEXA: ?\par Colonoscopy: N/A\par \par Immunizations:\par Pneumovax: 2005 (PCV )\par Tdap: ?\par Flu: 2019\par Shingrix: ?\par \par Falls Screenin in past year (2020)

## 2020-08-10 NOTE — ASSESSMENT
[Daily physical exercise as tolerated] : Daily physical exercise as tolerated [Advance Directives] : advance directives [Discussed at today's visit] : Advance Directives Discussed at today's visit [Designated Healthcare Proxy] : Designated healthcare proxy [Relationship: ___] : Relationship: [unfilled] [Name: ___] : Health Care Proxy's Name: [unfilled]  [FreeTextEntry1] : Need to f/u repeat weight at next visit, some weight loss in the preceding few months. \par \par Plan of care d/w patient, her son, Saurav, and HHA, Bernarda, in detail.\par \par Will f/u in one month. Needs full cognitive assessment. Son to bring in living will and HCP that have been completed.\par \par Additional 20 minutes spent reviewing recent hospitalization records and coordinating care w/ medication reconciliation.

## 2020-08-10 NOTE — SOCIAL HISTORY
[Fully functional (bathing, dressing, toileting, transferring, walking, feeding)] : Fully functional (bathing, dressing, toileting, transferring, walking, feeding) [One fall no injury in past year] : Patient reported one fall in the past year without injury [Walker] : walker [Safety elements used in home] : safety elements used in home [Shower Chair] : shower chair [Grab Bars] : grab bars [Anti-Slip Measures] : anti-slip measures [FreeTextEntry1] : Bernarda (24/7) [FreeTextEntry2] : Can assist with all needs [FreeTextEntry4] : Unclear. Has been more confused since hospitalization 2 months ago [de-identified] : Assistance w/ paying bills, laundry  [de-identified] : some recent assistance w/ bathing, no assistance w/ toileting

## 2020-08-10 NOTE — REVIEW OF SYSTEMS
[Feeling Poorly] : feeling poorly [Feeling Tired] : feeling tired [As Noted in HPI] : as noted in HPI [Negative] : Heme/Lymph [Chills] : no chills [Fever] : no fever

## 2020-08-10 NOTE — CURRENT MEDS
[Reports Changes In Medication (including OTC)] : Patient reports changes in medication [High Risk Medications Reviewed] : High risk medications reviewed [Medication Reconciliation Completed] : Medication reconciliation completed [ at pharmacy] :  at local pharmacy [de-identified] : see HPI [] : does not miss any medication doses [de-identified] : LUISITO

## 2020-08-26 DIAGNOSIS — R31.9 HEMATURIA, UNSPECIFIED: ICD-10-CM

## 2020-08-27 ENCOUNTER — LABORATORY RESULT (OUTPATIENT)
Age: 85
End: 2020-08-27

## 2020-08-28 DIAGNOSIS — N30.00 ACUTE CYSTITIS W/OUT HEMATURIA: ICD-10-CM

## 2020-09-03 ENCOUNTER — APPOINTMENT (OUTPATIENT)
Dept: HEMATOLOGY ONCOLOGY | Facility: CLINIC | Age: 85
End: 2020-09-03

## 2020-09-15 RX ORDER — CIPROFLOXACIN HYDROCHLORIDE 250 MG/1
250 TABLET, FILM COATED ORAL
Qty: 6 | Refills: 0 | Status: COMPLETED | COMMUNITY
Start: 2020-08-28 | End: 2020-09-15

## 2020-09-16 ENCOUNTER — NON-APPOINTMENT (OUTPATIENT)
Age: 85
End: 2020-09-16

## 2020-09-16 ENCOUNTER — APPOINTMENT (OUTPATIENT)
Dept: CARDIOLOGY | Facility: CLINIC | Age: 85
End: 2020-09-16
Payer: MEDICARE

## 2020-09-16 VITALS
HEART RATE: 84 BPM | OXYGEN SATURATION: 97 % | SYSTOLIC BLOOD PRESSURE: 211 MMHG | DIASTOLIC BLOOD PRESSURE: 74 MMHG | TEMPERATURE: 97.5 F

## 2020-09-16 DIAGNOSIS — I65.23 OCCLUSION AND STENOSIS OF BILATERAL CAROTID ARTERIES: ICD-10-CM

## 2020-09-16 PROCEDURE — 93000 ELECTROCARDIOGRAM COMPLETE: CPT

## 2020-09-16 PROCEDURE — 99205 OFFICE O/P NEW HI 60 MIN: CPT

## 2020-09-16 NOTE — PHYSICAL EXAM
[General Appearance - Well Developed] : well developed [Well Groomed] : well groomed [Normal Appearance] : normal appearance [No Deformities] : no deformities [General Appearance - Well Nourished] : well nourished [Normal Conjunctiva] : the conjunctiva exhibited no abnormalities [General Appearance - In No Acute Distress] : no acute distress [Eyelids - No Xanthelasma] : the eyelids demonstrated no xanthelasmas [Normal Oral Mucosa] : normal oral mucosa [No Oral Pallor] : no oral pallor [No Oral Cyanosis] : no oral cyanosis [Abdomen Tenderness] : non-tender [Abdomen Soft] : soft [Abdomen Mass (___ Cm)] : no abdominal mass palpated [] : no hepato-splenomegaly [FreeTextEntry1] : bilateral edema, bruising [Oriented To Time, Place, And Person] : oriented to person, place, and time [Affect] : the affect was normal [Mood] : the mood was normal [No Anxiety] : not feeling anxious

## 2020-09-16 NOTE — REASON FOR VISIT
[Initial Evaluation] : an initial evaluation of [FreeTextEntry2] : DVT [FreeTextEntry1] : 89 F here with her aid and son for eval of DVT\par \par Had venous duplex July 24th 2020:  R DVT femoral vein, pop and calf veins.\par Left leg not scanned\par Has been on eliquis 5mg BID since then\par \par History of hemorrhoids - Lloyd Lynn following\par [facility]: \par \par Mechanical fall Spring 2020.  June - hyponatremia, hospitalized, [facility].  Then July - [facility] found to have DVT\par \par 1st event\par Decreased mobility\par Episode of chest tightness yesterday.\par Has known moderate PAD - followed by [facility] - conservative therapies for now. \par \par Complains of numbness and tingling bottom of the R foot\par Complains of a feeling of somehting in her chest , something is stuck, better with burping

## 2020-09-21 ENCOUNTER — APPOINTMENT (OUTPATIENT)
Dept: ULTRASOUND IMAGING | Facility: IMAGING CENTER | Age: 85
End: 2020-09-21
Payer: MEDICARE

## 2020-09-21 ENCOUNTER — OUTPATIENT (OUTPATIENT)
Dept: OUTPATIENT SERVICES | Facility: HOSPITAL | Age: 85
LOS: 1 days | End: 2020-09-21
Payer: MEDICARE

## 2020-09-21 ENCOUNTER — APPOINTMENT (OUTPATIENT)
Dept: GERIATRICS | Facility: CLINIC | Age: 85
End: 2020-09-21
Payer: MEDICARE

## 2020-09-21 VITALS
RESPIRATION RATE: 15 BRPM | HEART RATE: 91 BPM | HEIGHT: 59 IN | DIASTOLIC BLOOD PRESSURE: 62 MMHG | OXYGEN SATURATION: 99 % | TEMPERATURE: 97.5 F | WEIGHT: 122 LBS | BODY MASS INDEX: 24.6 KG/M2 | SYSTOLIC BLOOD PRESSURE: 142 MMHG

## 2020-09-21 DIAGNOSIS — R41.3 OTHER AMNESIA: ICD-10-CM

## 2020-09-21 DIAGNOSIS — I82.431 ACUTE EMBOLISM AND THROMBOSIS OF RIGHT POPLITEAL VEIN: ICD-10-CM

## 2020-09-21 DIAGNOSIS — Z23 ENCOUNTER FOR IMMUNIZATION: ICD-10-CM

## 2020-09-21 PROCEDURE — 90662 IIV NO PRSV INCREASED AG IM: CPT

## 2020-09-21 PROCEDURE — 93970 EXTREMITY STUDY: CPT

## 2020-09-21 PROCEDURE — 93970 EXTREMITY STUDY: CPT | Mod: 26

## 2020-09-21 PROCEDURE — G0008: CPT

## 2020-09-21 PROCEDURE — 99215 OFFICE O/P EST HI 40 MIN: CPT | Mod: 25

## 2020-09-21 NOTE — ASSESSMENT
[FreeTextEntry1] : Patient also scheduled for TTE given murmur on exam may be c/w AS (although no significant functional limitations at this time). \par \par RTC in one month

## 2020-09-21 NOTE — HISTORY OF PRESENT ILLNESS
[FreeTextEntry1] : 89 year old woman w/ PMH PMR (on chronic steroids), internal hemorrhoids s/p previous banding, HTN, PAD s/p arterial bypass ~15 years ago, and recent admission for acute RLE DVT presents for initial visit. Patient was recently d/c from [facility] on 20.\par \par Vasc Cards: Thomas Hinds\par Colorectal Sx: Dr. Lloyd Lynn\par Vasc Sx: Dr. Chucky Jonas\par Ophthalmology: Dr. Morrison ([place]) - [phone]\par \par Seen as initial visit 2020 post-hospitalization/SHAUNA courses for hyponatremia and then acute DVT. Now on eliquis, seen by Dr. Hinds yesterday. Main complaint today is epigastric discomfort. \par \par Epigastric Discomfort: Sx began soon after D/C from recent hospitalization. Described as something "stuck" at her xiphoid process (points there with one finger). She notes that it is worse after eating (solids and liquids, no change), no pain, nausea or vomiting. She has been using Gas-X, which provides some temporary relief. Also feels some improvement after burping. No other exacerbating or alleviating factors, including lying supine. No prior EGD, no similar sx reported in the past. No associated SOB, chest pain, back pain, LUQ pain. No dysphagia or odynophagia. Recent cardiac eval, no evidence of cardiac etiology. \par \par DVT: Spoke w/ Dr. Hinds, rec 3 months A/C pending repeat LE dopplers (to be completed today). On eliquis. No reported bleeding.\par \par PAD: Seen by vascular 2020. Noninvasive arterial physiological studies confirmed presence of moderate PAD in the right leg. Patient preferred conservative management. D/C'ed dipyridamole 25 BID during last visit, which she has been on for many years. No ASA/plavix/cilostazol. \par \par PMR: On prednisone 5 mg TID for many years. Reviewed CRP and ESR, have been WNL for the past 5 years. \par \par Peripheral Neuropathy: Has intermittent "tingling" of her distal b/l feet, worst at b/l hallux. She notes at worst it is a "5/10" and does not interfere with ADLs. Not on treatment. \par \par Hyponatremia: Na 130 on recent BMP. Was hospitalized when Na 108 2020. \par \par UTI: Recently treated with cipro (due to UCX results and allergies), sx have resolved. \par \par HTN: On valsartan 160 mg qd. Has stopped HCTZ since hospitalization for hyponatremia.\par \par Cognitive Impairment: Son (and patient) note that her "memory is worse" since hospitalizations over the summer. She is now dependent on some IADLs (finances, transportation), previously was completely independent before sentinel hospitalization. \par \par HCM:\par DEXA: ?\par Colonoscopy: N/A\par \par Immunizations:\par Pneumovax: 2005 (PCV )\par Tdap: ?\par Flu: 2019\par Shingrix: ?\par \par Falls Screenin in past year (2020)

## 2020-09-21 NOTE — REASON FOR VISIT
[Follow-Up] : a follow-up visit [Pre-Visit Preparation] : pre-visit preparation was done [Intercurrent Specialty/Sub-specialty Visits] : the patient has intercurrent specialty/sub-specialty visits [Formal Caregiver] : formal caregiver [Family Member] : family member [FreeTextEntry3] : [facility] Cards: Thomas Hinds

## 2020-09-21 NOTE — CURRENT MEDS
[Reports Changes In Medication (including OTC)] : Patient reports changes in medication [Medication Reconciliation Completed] : Medication reconciliation completed [High Risk Medications Reviewed] : High risk medications reviewed [ at pharmacy] :  at local pharmacy [] : does not miss any medication doses [de-identified] : see HPI [de-identified] : LUISITO

## 2020-09-21 NOTE — PHYSICAL EXAM
[General Appearance - Alert] : alert [General Appearance - In No Acute Distress] : in no acute distress [General Appearance - Well-Appearing] : healthy appearing [Sclera] : the sclera and conjunctiva were normal [PERRL With Normal Accommodation] : pupils were equal in size, round, and reactive to light [Extraocular Movements] : extraocular movements were intact [Normal Oral Mucosa] : normal oral mucosa [No Oral Pallor] : no oral pallor [No Oral Cyanosis] : no oral cyanosis [Outer Ear] : the ears and nose were normal in appearance [Oropharynx] : The oropharynx was normal [Neck Appearance] : the appearance of the neck was normal [Neck Cervical Mass (___cm)] : no neck mass was observed [Jugular Venous Distention Increased] : there was no jugular-venous distention [Respiration, Rhythm And Depth] : normal respiratory rhythm and effort [Auscultation Breath Sounds / Voice Sounds] : lungs were clear to auscultation bilaterally [Heart Rate And Rhythm] : heart rate was normal and rhythm regular [Heart Sounds Gallop] : no gallops [Bowel Sounds] : normal bowel sounds [Abdomen Soft] : soft [Abdomen Tenderness] : non-tender [] : no hepato-splenomegaly [Abdomen Mass (___ Cm)] : no abdominal mass palpated [Cervical Lymph Nodes Enlarged Posterior Bilaterally] : posterior cervical [Cervical Lymph Nodes Enlarged Anterior Bilaterally] : anterior cervical [No CVA Tenderness] : no ~M costovertebral angle tenderness [Musculoskeletal - Swelling] : no joint swelling seen [Cranial Nerves] : cranial nerves 2-12 were intact [Sensation] : the sensory exam was normal to light touch and pinprick [Motor Exam] : the motor exam was normal [No Focal Deficits] : no focal deficits [Oriented To Time, Place, And Person] : oriented to person, place, and time [Impaired Insight] : insight and judgment were intact [FreeTextEntry1] : some mild discoloration of R hallux at nailbed. Foot is warm to touch

## 2020-09-21 NOTE — REVIEW OF SYSTEMS
[Negative] : Heme/Lymph [Feeling Poorly] : feeling poorly [As Noted in HPI] : as noted in HPI [Fever] : no fever [Chills] : no chills [Feeling Tired] : not feeling tired

## 2020-09-21 NOTE — SOCIAL HISTORY
[One fall no injury in past year] : Patient reported one fall in the past year without injury [Fully functional (bathing, dressing, toileting, transferring, walking, feeding)] : Fully functional (bathing, dressing, toileting, transferring, walking, feeding) [Walker] : walker [Safety elements used in home] : safety elements used in home [Grab Bars] : grab bars [Shower Chair] : shower chair [Anti-Slip Measures] : anti-slip measures [FreeTextEntry1] : Bernarda (24/7) [FreeTextEntry2] : Can assist with all needs [FreeTextEntry4] : Unclear. Has been more confused since hospitalization 2 months ago [de-identified] : some recent assistance w/ bathing, no assistance w/ toileting [de-identified] : Assistance w/ paying bills, laundry

## 2020-09-28 ENCOUNTER — APPOINTMENT (OUTPATIENT)
Dept: CARDIOLOGY | Facility: CLINIC | Age: 85
End: 2020-09-28
Payer: MEDICARE

## 2020-09-28 PROCEDURE — 93306 TTE W/DOPPLER COMPLETE: CPT

## 2020-10-12 ENCOUNTER — APPOINTMENT (OUTPATIENT)
Dept: CV DIAGNOSITCS | Facility: HOSPITAL | Age: 85
End: 2020-10-12

## 2020-10-19 ENCOUNTER — RX RENEWAL (OUTPATIENT)
Age: 85
End: 2020-10-19

## 2020-10-26 ENCOUNTER — APPOINTMENT (OUTPATIENT)
Dept: GERIATRICS | Facility: CLINIC | Age: 85
End: 2020-10-26
Payer: MEDICARE

## 2020-10-26 VITALS
SYSTOLIC BLOOD PRESSURE: 160 MMHG | DIASTOLIC BLOOD PRESSURE: 80 MMHG | HEART RATE: 94 BPM | OXYGEN SATURATION: 96 % | HEIGHT: 59 IN | RESPIRATION RATE: 14 BRPM | BODY MASS INDEX: 25.08 KG/M2 | WEIGHT: 124.38 LBS | TEMPERATURE: 97.8 F

## 2020-10-26 PROCEDURE — 99214 OFFICE O/P EST MOD 30 MIN: CPT

## 2020-10-26 RX ORDER — FAMOTIDINE 20 MG/1
20 TABLET ORAL DAILY
Qty: 30 | Refills: 0 | Status: DISCONTINUED | COMMUNITY
Start: 2020-09-21 | End: 2020-10-26

## 2020-10-26 NOTE — HISTORY OF PRESENT ILLNESS
[FreeTextEntry1] : 89 year old woman w/ PMH PMR (on chronic steroids), internal hemorrhoids s/p previous banding, HTN, PAD s/p arterial bypass ~15 years ago, and July hospitalization for acute RLE DVT presents for f/u visit.\par \par Vasc Cards: Thomas Hinds\par Colorectal Sx: Dr. Lloyd Lynn\par Vasc Sx: Dr. Chucky Jonas\par Ophthalmology: Dr. Morrison ([place]) - [phone]\par \par Cognitive Impairment: Son (and patient) note that her "memory is worse" since hospitalizations over the summer. She is now dependent on some IADLs (finances, transportation), previously was completely independent before sentinel hospitalization. She feels her "memory is going" and she doesn't "feel right." She stays connected w/ family (grandchildren and great-grandchildren) through facetime calls. She has been reading the newspaper, but typically only the headlines due to her decreased visual acuity. She has lost some interest in activities she normally engages in. She spends some days "doing nothing." She notes preserved long-term memories. Has not had prior cognitive assessment. \par \par Epigastric Discomfort: Sx began soon after D/C from recent hospitalization. Described as something "stuck" at her xiphoid process (points there with one finger). She notes that it is worse after eating (solids and liquids, no change), no pain, nausea or vomiting. She has been using Gas-X, which provides some temporary relief. Also feels some improvement after burping. No other exacerbating or alleviating factors, including lying supine. No prior EGD, no similar sx reported in the past. No associated SOB, chest pain, back pain, LUQ pain. No dysphagia or odynophagia. Recent cardiac eval, no evidence of cardiac etiology. During last visit, decision made to pursue non-invasive treatment and she was started on famotidine. Symptoms improved significantly. She feels it is "no longer bothering her." Preference remains to avoid invasive w/u if possible. She has also gained weight in the last month (couple of pounds). \par \par DVT: Spoke w/ Dr. Hinds 2020, repeat dopplers noted residual clot. Rec to c/w eliquis x at least 6 months. Will repeat again in December. No reported bleeding.\par \par PAD: Seen by vascular 2020. Noninvasive arterial physiological studies confirmed presence of moderate PAD in the right leg. Patient preferred conservative management. No ASA/plavix/cilostazol. \par \par PMR: On prednisone 5 mg TID for many years. Reviewed CRP and ESR, have been WNL for the past 5 years. Decreased dose to 12.5 mg daily last month. \par \par Peripheral Neuropathy: Has intermittent "tingling" of her distal b/l feet, worst at b/l hallux. She notes at worst it is a "5/10" and does not interfere with ADLs. Not on treatment. \par \par Hyponatremia: Na 130 on last BMP. Was hospitalized when Na 108 2020. \par \par HTN: On valsartan 160 mg qd. Has stopped HCTZ since hospitalization for hyponatremia. SBP is elevated on some visits.\par \par Moderate AS: Had TTE 20 due to audible murmur. Noted moderate AS and mild-mod MR. She has EF 70% w/ mild diastolic dysfunction.\par \par HCM:\par DEXA: ?\par Colonoscopy: N/A\par \par Immunizations:\par Pneumovax: 2005 (PCV )\par Tdap: ?\par Flu: 2019\par Shingrix: ?\par \par Falls Screenin in past year (2020)

## 2020-10-26 NOTE — REASON FOR VISIT
[Follow-Up] : a follow-up visit [Pre-Visit Preparation] : pre-visit preparation was done [Intercurrent Specialty/Sub-specialty Visits] : the patient has intercurrent specialty/sub-specialty visits [Formal Caregiver] : formal caregiver [Family Member] : family member [FreeTextEntry3] : [facility] Cards: Thomas Hinds

## 2020-10-26 NOTE — PHYSICAL EXAM
[General Appearance - Alert] : alert [General Appearance - In No Acute Distress] : in no acute distress [General Appearance - Well-Appearing] : healthy appearing [Sclera] : the sclera and conjunctiva were normal [PERRL With Normal Accommodation] : pupils were equal in size, round, and reactive to light [Extraocular Movements] : extraocular movements were intact [Normal Oral Mucosa] : normal oral mucosa [No Oral Pallor] : no oral pallor [No Oral Cyanosis] : no oral cyanosis [Outer Ear] : the ears and nose were normal in appearance [Oropharynx] : The oropharynx was normal [Neck Appearance] : the appearance of the neck was normal [Neck Cervical Mass (___cm)] : no neck mass was observed [Jugular Venous Distention Increased] : there was no jugular-venous distention [Respiration, Rhythm And Depth] : normal respiratory rhythm and effort [Auscultation Breath Sounds / Voice Sounds] : lungs were clear to auscultation bilaterally [Heart Rate And Rhythm] : heart rate was normal and rhythm regular [Heart Sounds Gallop] : no gallops [Bowel Sounds] : normal bowel sounds [Abdomen Soft] : soft [Abdomen Tenderness] : non-tender [] : no hepato-splenomegaly [Abdomen Mass (___ Cm)] : no abdominal mass palpated [Cervical Lymph Nodes Enlarged Posterior Bilaterally] : posterior cervical [Cervical Lymph Nodes Enlarged Anterior Bilaterally] : anterior cervical [No CVA Tenderness] : no ~M costovertebral angle tenderness [Musculoskeletal - Swelling] : no joint swelling seen [Cranial Nerves] : cranial nerves 2-12 were intact [Sensation] : the sensory exam was normal to light touch and pinprick [Motor Exam] : the motor exam was normal [No Focal Deficits] : no focal deficits [Oriented To Time, Place, And Person] : oriented to person, place, and time [Impaired Insight] : insight and judgment were intact [FreeTextEntry1] : no suicidal ideation

## 2020-10-26 NOTE — REVIEW OF SYSTEMS
[Feeling Poorly] : feeling poorly [As Noted in HPI] : as noted in HPI [Negative] : Heme/Lymph [Fever] : no fever [Chills] : no chills [Feeling Tired] : not feeling tired

## 2020-10-26 NOTE — DATA REVIEWED
[FreeTextEntry1] : Had TTE 9/28/20 due to audible murmur. Noted moderate AS and mild-mod MR. She has EF 70% w/ mild diastolic dysfunction.

## 2020-10-26 NOTE — CURRENT MEDS
[Medication Reconciliation Completed] : Medication reconciliation completed [High Risk Medications Reviewed] : High risk medications reviewed [ at pharmacy] :  at local pharmacy [Reports Changes In Medication (including OTC)] : Patient reports no changes in medication [] : does not miss any medication doses [de-identified] : LUISITO

## 2020-10-26 NOTE — ASSESSMENT
[Regular activities] : regular physical, social and mental activities [FreeTextEntry1] : Emotional support provided w/ new dx presented of mild dementia. Patient has capacity to make decisions related to her care and wanted to be told diagnosis. She will have further discussions w/ her family. Will have close f/u in 2 weeks to answer further questions and meet w/ SW.

## 2020-10-26 NOTE — SOCIAL HISTORY
[One fall no injury in past year] : Patient reported one fall in the past year without injury [Fully functional (bathing, dressing, toileting, transferring, walking, feeding)] : Fully functional (bathing, dressing, toileting, transferring, walking, feeding) [Walker] : walker [Safety elements used in home] : safety elements used in home [Grab Bars] : grab bars [Shower Chair] : shower chair [Anti-Slip Measures] : anti-slip measures [Independent] : using telephone [Some assistance needed] : managing medications [Full assistance needed] : managing finances [FreeTextEntry1] : Bernarda (24/7) [FreeTextEntry2] : Can assist with all needs [FreeTextEntry4] : Unclear. Has been more confused since hospitalization 2 months ago [de-identified] : some recent assistance w/ bathing, no assistance w/ toileting [de-identified] : Assistance w/ paying bills, laundry

## 2020-11-18 ENCOUNTER — LABORATORY RESULT (OUTPATIENT)
Age: 85
End: 2020-11-18

## 2020-11-18 ENCOUNTER — APPOINTMENT (OUTPATIENT)
Dept: GERIATRICS | Facility: CLINIC | Age: 85
End: 2020-11-18
Payer: MEDICARE

## 2020-11-18 VITALS
TEMPERATURE: 97.7 F | OXYGEN SATURATION: 97 % | HEART RATE: 96 BPM | HEIGHT: 59 IN | DIASTOLIC BLOOD PRESSURE: 60 MMHG | SYSTOLIC BLOOD PRESSURE: 156 MMHG | RESPIRATION RATE: 15 BRPM | BODY MASS INDEX: 25 KG/M2 | WEIGHT: 124 LBS

## 2020-11-18 PROCEDURE — 99214 OFFICE O/P EST MOD 30 MIN: CPT

## 2020-11-18 NOTE — HISTORY OF PRESENT ILLNESS
[FreeTextEntry1] : 89 year old woman w/ PMH PMR (on chronic steroids), internal hemorrhoids s/p previous banding, HTN, PAD s/p arterial bypass ~15 years ago, and July hospitalization for acute RLE DVT presents for f/u visit.\par \par Vasc Cards: Thomas Hinds\par Colorectal Sx: Dr. Lloyd Lynn\par Vasc Sx: Dr. Chucky Jonas\par Ophthalmology: Dr. Morrison ([place]) - [phone]\par \par Mild-Moderate Dementia: Patient is dependent on IADLs. MoCA 16/30 on 10/26/20. Patient and son are aware she has cognitive dysfunction. Patient has been increasingly withdrawn and anxious over the past few weeks. She states "I know something is wrong with me, but I don't know what it is. I can't think, I can't remember." HHA (Bernarda) notes that she continuously perseverates on her memory loss. The patient's long-term memory is relatively intact. She has difficulty w/ processing new information. She notes that she feels "sad" and "anxious." Her HHA notes she cries at night sometimes. She is not partaking in her usual activities, like reading and walking. Her HHA notes she has times where she is more active (for example, she sometimes "sings" at night), but has largely been depressed. She is eating well, weight has been stable. No suicidal ideation. \par \par Epigastric Discomfort: Significant improvement since starting famotidine. HHA notes she is no longer c/o non-specific symptoms. She is eating well, weight is objectively stable. Patient and family are in favor of less aggressive approach, including no invasive procedures. \par \par DVT: Spoke w/ Dr. Hinds 9/2020, repeat dopplers noted residual clot. Rec to c/w eliquis x at least 6 months. Will repeat again in December. No reported bleeding.\par \par PAD: Seen by vascular 2/2020. Noninvasive arterial physiological studies confirmed presence of moderate PAD in the right leg. Patient preferred conservative management. No ASA/plavix/cilostazol. \par \par PMR: Was on prednisone 5 mg TID for many years. Reviewed CRP and ESR, have been WNL for the past 5 years. Decreased dose to 10 mg daily last month. She has no reported pain in her hips or shoulders. \par \par Peripheral Neuropathy: Has intermittent "tingling" of her distal b/l feet, worst at b/l hallux. She notes at worst it is a "5/10" and does not interfere with ADLs. Not on treatment. \par \par Hyponatremia: Na 130 on last BMP 8/2020. Was hospitalized when Na 108 6/2020 in the setting of HCTZ use. \par \par HTN: On valsartan 160 mg qd. Has stopped HCTZ since hospitalization for hyponatremia. SBP is elevated on some visits.\par \par Moderate AS: Had TTE 9/28/20 due to audible murmur. Noted moderate AS and mild-mod MR. She has EF 70% w/ mild diastolic dysfunction. She has no MARCELO or PND, no LE edema. \par \par HCM:\par DEXA: ?\par Colonoscopy: N/A\par \par Immunizations:\par Pneumovax: 2005 (PCV 2015)\par Tdap: ?\par Flu: 2019\par Shingrix: ?

## 2020-11-18 NOTE — SOCIAL HISTORY
[One fall no injury in past year] : Patient reported one fall in the past year without injury [Fully functional (bathing, dressing, toileting, transferring, walking, feeding)] : Fully functional (bathing, dressing, toileting, transferring, walking, feeding) [Independent] : using telephone [Some assistance needed] : managing medications [Full assistance needed] : managing finances [Walker] : walker [Safety elements used in home] : safety elements used in home [Grab Bars] : grab bars [Shower Chair] : shower chair [Anti-Slip Measures] : anti-slip measures [FreeTextEntry1] : Bernarda (24/7) [FreeTextEntry2] : Can assist with all needs [FreeTextEntry4] : Unclear. Has been more confused since hospitalization 2 months ago [de-identified] : some recent assistance w/ bathing, no assistance w/ toileting [de-identified] : Assistance w/ paying bills, laundry

## 2020-11-18 NOTE — REVIEW OF SYSTEMS
[Feeling Poorly] : feeling poorly [As Noted in HPI] : as noted in HPI [Negative] : Heme/Lymph [Fever] : no fever [Chills] : no chills [Feeling Tired] : not feeling tired [Recent Weight Loss (___ Lbs)] : no recent weight loss

## 2020-11-18 NOTE — CURRENT MEDS
[Medication Reconciliation Completed] : Medication reconciliation completed [High Risk Medications Reviewed] : High risk medications reviewed [ at pharmacy] :  at local pharmacy [Reports Changes In Medication (including OTC)] : Patient reports no changes in medication [] : does not miss any medication doses [de-identified] : LUISITO

## 2020-11-18 NOTE — PHYSICAL EXAM
[General Appearance - Alert] : alert [General Appearance - In No Acute Distress] : in no acute distress [General Appearance - Well-Appearing] : healthy appearing [Sclera] : the sclera and conjunctiva were normal [PERRL With Normal Accommodation] : pupils were equal in size, round, and reactive to light [Extraocular Movements] : extraocular movements were intact [Normal Oral Mucosa] : normal oral mucosa [No Oral Pallor] : no oral pallor [No Oral Cyanosis] : no oral cyanosis [Outer Ear] : the ears and nose were normal in appearance [Oropharynx] : The oropharynx was normal [Neck Appearance] : the appearance of the neck was normal [Neck Cervical Mass (___cm)] : no neck mass was observed [Jugular Venous Distention Increased] : there was no jugular-venous distention [Respiration, Rhythm And Depth] : normal respiratory rhythm and effort [Auscultation Breath Sounds / Voice Sounds] : lungs were clear to auscultation bilaterally [Heart Rate And Rhythm] : heart rate was normal and rhythm regular [Heart Sounds Gallop] : no gallops [Bowel Sounds] : normal bowel sounds [Abdomen Soft] : soft [Abdomen Tenderness] : non-tender [] : no hepato-splenomegaly [Abdomen Mass (___ Cm)] : no abdominal mass palpated [Cervical Lymph Nodes Enlarged Posterior Bilaterally] : posterior cervical [Cervical Lymph Nodes Enlarged Anterior Bilaterally] : anterior cervical [No CVA Tenderness] : no ~M costovertebral angle tenderness [Musculoskeletal - Swelling] : no joint swelling seen [Cranial Nerves] : cranial nerves 2-12 were intact [Sensation] : the sensory exam was normal to light touch and pinprick [Motor Exam] : the motor exam was normal [No Focal Deficits] : no focal deficits [Impaired Insight] : insight and judgment were intact [FreeTextEntry1] : no suicidal ideation

## 2020-11-18 NOTE — ASSESSMENT
[Regular activities] : regular physical, social and mental activities [Social Work Referral] : Social Work referral [FreeTextEntry1] : RTC in 3 weeks

## 2020-11-18 NOTE — REASON FOR VISIT
[Follow-Up] : a follow-up visit [Pre-Visit Preparation] : pre-visit preparation was done [Intercurrent Specialty/Sub-specialty Visits] : the patient has intercurrent specialty/sub-specialty visits [Formal Caregiver] : formal caregiver [Family Member] : family member [FreeTextEntry3] : [facility] Cards: Thomas Hinds

## 2020-11-20 LAB
ANION GAP SERPL CALC-SCNC: 8 MMOL/L
BASOPHILS # BLD AUTO: 0.03 K/UL
BASOPHILS NFR BLD AUTO: 0.2 %
BUN SERPL-MCNC: 34 MG/DL
CALCIUM SERPL-MCNC: 9.6 MG/DL
CHLORIDE SERPL-SCNC: 105 MMOL/L
CO2 SERPL-SCNC: 24 MMOL/L
CREAT SERPL-MCNC: 1.26 MG/DL
CRP SERPL-MCNC: 0.1 MG/DL
EOSINOPHIL # BLD AUTO: 0.05 K/UL
EOSINOPHIL NFR BLD AUTO: 0.3 %
ERYTHROCYTE [SEDIMENTATION RATE] IN BLOOD BY WESTERGREN METHOD: 15 MM/HR
GLUCOSE SERPL-MCNC: 101 MG/DL
HCT VFR BLD CALC: 27.1 %
HGB BLD-MCNC: 8.4 G/DL
IMM GRANULOCYTES NFR BLD AUTO: 0.8 %
LYMPHOCYTES # BLD AUTO: 1.92 K/UL
LYMPHOCYTES NFR BLD AUTO: 11.6 %
MAN DIFF?: NORMAL
MCHC RBC-ENTMCNC: 26.8 PG
MCHC RBC-ENTMCNC: 31 GM/DL
MCV RBC AUTO: 86.3 FL
MONOCYTES # BLD AUTO: 0.95 K/UL
MONOCYTES NFR BLD AUTO: 5.7 %
NEUTROPHILS # BLD AUTO: 13.48 K/UL
NEUTROPHILS NFR BLD AUTO: 81.4 %
PLATELET # BLD AUTO: 480 K/UL
POTASSIUM SERPL-SCNC: 4.9 MMOL/L
RBC # BLD: 3.14 M/UL
RBC # FLD: 14.5 %
SODIUM SERPL-SCNC: 136 MMOL/L
WBC # FLD AUTO: 16.56 K/UL

## 2020-11-20 RX ORDER — PREDNISONE 5 MG/1
5 TABLET ORAL
Qty: 90 | Refills: 3 | Status: COMPLETED | COMMUNITY
Start: 2020-07-29 | End: 2020-11-20

## 2020-11-24 ENCOUNTER — OUTPATIENT (OUTPATIENT)
Dept: OUTPATIENT SERVICES | Facility: HOSPITAL | Age: 85
LOS: 1 days | Discharge: HOME | End: 2020-11-24

## 2020-11-24 DIAGNOSIS — D64.9 ANEMIA, UNSPECIFIED: ICD-10-CM

## 2020-11-24 NOTE — PATIENT PROFILE ADULT - DISASTER - [id number]
[FreeTextEntry1] : This note was written by Kamilla Lundberg on 11/24/2020 acting as scribe for Dr. Lepe 
yes

## 2020-11-25 ENCOUNTER — APPOINTMENT (OUTPATIENT)
Dept: HEMATOLOGY ONCOLOGY | Facility: CLINIC | Age: 85
End: 2020-11-25
Payer: MEDICARE

## 2020-11-25 DIAGNOSIS — I82.431 ACUTE EMBOLISM AND THROMBOSIS OF RIGHT POPLITEAL VEIN: ICD-10-CM

## 2020-11-25 DIAGNOSIS — D72.9 DISORDER OF WHITE BLOOD CELLS, UNSPECIFIED: ICD-10-CM

## 2020-11-25 DIAGNOSIS — D47.3 ESSENTIAL (HEMORRHAGIC) THROMBOCYTHEMIA: ICD-10-CM

## 2020-11-25 PROCEDURE — 99205 OFFICE O/P NEW HI 60 MIN: CPT | Mod: 95

## 2020-11-25 NOTE — HISTORY OF PRESENT ILLNESS
[Home] : at home, [unfilled] , at the time of the visit. [Medical Office: ([facility])___] : at the medical office located in  [Verbal consent obtained from patient] : the patient, [unfilled] [de-identified] : 89 y/o F with mild-moderate dementia, PAD s/p fem-pop bypass, PMR on chronic steroids for a very prolonged time, who is presenting today for evaluation of iron deficiency anemia. Patient reports that she is coming to me because she is not feeling too well, but she does not know how to communicate. She does not believe that she has dementia. She is a very poor historian and unable to give a full history, so much of history obtained from the chart. Patient was hospitalized this summer for a lower extremity DVT, she is currently on Eliquis and following with Dr. Hinds. \par \par She started taking iron orally last week, has noticed dark stools since starting it but no constipation. No nausea or vomiting. SHe chronically complains of leg pain with exertion, but no abdominal pain or cramping at the present. No shortness of breath, but she is only able to walk about a quarter of a block due to her poor functional status so exertional dyspnea not well evaluated. No chest pain at the present time. She gets dizziness sometimes, which she takes meclizine for once daily. However, it is more of a lightheadedness than a dizziness. She reports that she gets very tired and fatigued during the day. She has never noticed any blood in her stool, but she does have hemorrhoids that bleed once in a while. NO PICA. \par \par She had many colonoscopies, but most recent one was a very long time ago. \par She smoked occasionally when she was young but never a chainsmoker. Never EtOH drinker.

## 2020-11-25 NOTE — ASSESSMENT
[FreeTextEntry1] : 89 y/o F with apparent mild-moderate dementia, PAD, PMR on chronic steroids here for evaluation of anemia, also with leukocytosis and thrombocytosis. \par \par -Based on labwork sent with Dr. Saucedo, anemia does appear to be iron deficiency in origin. There is also the possibility that there may be some underlying myelodysplasia and/or nutritional B12 deficiency given low - normal MCV value, but the likely predominant factor is iron deficiency.\par -Would add on methylmalonic acid in the serum to evaluate for functional B12 deficiency and start on oral B12 supplementation. \par -Continue with PO ferrous sulfate 325 mg daily at this point, with vitamin C and meals. Patient seems to be tolerating well to this point, only with complaints of dark stool. \par -No evidence of active bleeding, but possibly slow upper GI bleeding. High risk due to Eliquis and steroids. Conservative management given age and comorbidities. \par -Leukocytosis likely due to chronic steroids. \par -Thrombocytosis likely reactive due to iron deficiency. \par -Continue with Eliquis for DVT, following with Dr. Hinds. Repeat US in 12 /2020 for residual thrombosis. No indication for hypercoagulable workup at this time. \par -Follow up with home CBC qmonth for now. Discussed IV iron and blood transfusion in the case that PO iron becomes intolerable. \par -RTC in 3 months. \par -Discussed with son Saurav on the phone. \par -Patient and son understand and agree with plan. All information explained to the best of my ability.\par \par The visit was completed via tele-medicine visit due to the restrictions of the COVID-19 pandemic. All issues as above were discussed and addressed but no physical exam was performed. If it was felt that the patient should be evaluated in the clinic then they were directed there. The patient verbally consented to visit.\par \par

## 2020-11-25 NOTE — REVIEW OF SYSTEMS
[Fatigue] : fatigue [Vision Problems] : vision problems [Leg Claudication] : intermittent leg claudication [Lower Ext Edema] : lower extremity edema [Dizziness] : dizziness [Anxiety] : anxiety [Depression] : depression [Easy Bruising] : a tendency for easy bruising [Fever] : no fever [Chills] : no chills [Night Sweats] : no night sweats [Recent Change In Weight] : ~T no recent weight change [Dysphagia] : no dysphagia [Nosebleeds] : no nosebleeds [Odynophagia] : no odynophagia [Chest Pain] : no chest pain [Palpitations] : no palpitations [Shortness Of Breath] : no shortness of breath [Wheezing] : no wheezing [Cough] : no cough [Abdominal Pain] : no abdominal pain [Vomiting] : no vomiting [Constipation] : no constipation [Diarrhea] : no diarrhea [Dysuria] : no dysuria [Dysmenorrhea/Abn Vaginal Bleeding] : no dysmenorrhea/abnormal vaginal bleeding [Joint Pain] : no joint pain [Joint Stiffness] : no joint stiffness [Skin Rash] : no skin rash [Fainting] : no fainting [Insomnia] : no insomnia [Easy Bleeding] : no tendency for easy bleeding [FreeTextEntry3] : poor vision from macular degeneration - but hasn't worsened recently [FreeTextEntry5] : in the summer, her legs tend to swell a little but not in the winter. Pain in her lower extremities worse with exertion.  [de-identified] : she tends to break out easily [de-identified] : lightheadedness intermittently, especially on rising from seated position.

## 2020-11-25 NOTE — PHYSICAL EXAM
[de-identified] : Cannot perform physical exam due to nature of telemedicine visit, however on telemedicine patient appears to not be in any acute distress

## 2020-11-25 NOTE — REASON FOR VISIT
[Initial Consultation] : an initial consultation for [Other: _____] : [unfilled] [FreeTextEntry2] : anemia, iron deficiency

## 2020-12-02 ENCOUNTER — LABORATORY RESULT (OUTPATIENT)
Age: 85
End: 2020-12-02

## 2020-12-07 ENCOUNTER — APPOINTMENT (OUTPATIENT)
Dept: ULTRASOUND IMAGING | Facility: IMAGING CENTER | Age: 85
End: 2020-12-07
Payer: MEDICARE

## 2020-12-07 ENCOUNTER — OUTPATIENT (OUTPATIENT)
Dept: OUTPATIENT SERVICES | Facility: HOSPITAL | Age: 85
LOS: 1 days | End: 2020-12-07
Payer: MEDICARE

## 2020-12-07 ENCOUNTER — RESULT REVIEW (OUTPATIENT)
Age: 85
End: 2020-12-07

## 2020-12-07 DIAGNOSIS — I82.409 ACUTE EMBOLISM AND THROMBOSIS OF UNSPECIFIED DEEP VEINS OF UNSPECIFIED LOWER EXTREMITY: ICD-10-CM

## 2020-12-07 PROCEDURE — 93970 EXTREMITY STUDY: CPT

## 2020-12-07 PROCEDURE — 93970 EXTREMITY STUDY: CPT | Mod: 26

## 2020-12-09 ENCOUNTER — APPOINTMENT (OUTPATIENT)
Dept: GERIATRICS | Facility: CLINIC | Age: 85
End: 2020-12-09
Payer: MEDICARE

## 2020-12-09 VITALS
RESPIRATION RATE: 15 BRPM | DIASTOLIC BLOOD PRESSURE: 80 MMHG | TEMPERATURE: 98 F | OXYGEN SATURATION: 97 % | WEIGHT: 124 LBS | HEART RATE: 110 BPM | BODY MASS INDEX: 25 KG/M2 | HEIGHT: 59 IN | SYSTOLIC BLOOD PRESSURE: 160 MMHG

## 2020-12-09 VITALS — DIASTOLIC BLOOD PRESSURE: 70 MMHG | SYSTOLIC BLOOD PRESSURE: 130 MMHG | HEART RATE: 80 BPM

## 2020-12-09 DIAGNOSIS — I82.409 ACUTE EMBOLISM AND THROMBOSIS OF UNSPECIFIED DEEP VEINS OF UNSPECIFIED LOWER EXTREMITY: ICD-10-CM

## 2020-12-09 DIAGNOSIS — K59.00 CONSTIPATION, UNSPECIFIED: ICD-10-CM

## 2020-12-09 PROCEDURE — 99214 OFFICE O/P EST MOD 30 MIN: CPT | Mod: 25

## 2020-12-09 PROCEDURE — 96372 THER/PROPH/DIAG INJ SC/IM: CPT

## 2020-12-09 RX ORDER — APIXABAN 5 MG/1
5 TABLET, FILM COATED ORAL
Qty: 60 | Refills: 1 | Status: DISCONTINUED | COMMUNITY
Start: 2020-07-29 | End: 2020-12-09

## 2020-12-09 RX ORDER — CYANOCOBALAMIN 1000 UG/ML
1000 INJECTION INTRAMUSCULAR; SUBCUTANEOUS
Qty: 0 | Refills: 0 | Status: COMPLETED | OUTPATIENT
Start: 2020-12-09

## 2020-12-09 RX ADMIN — CYANOCOBALAMIN 1 MCG/ML: 1000 INJECTION INTRAMUSCULAR; SUBCUTANEOUS at 00:00

## 2020-12-09 NOTE — REASON FOR VISIT
[Follow-Up] : a follow-up visit [Pre-Visit Preparation] : pre-visit preparation was done [Intercurrent Specialty/Sub-specialty Visits] : the patient has intercurrent specialty/sub-specialty visits [Formal Caregiver] : formal caregiver [Family Member] : family member [FreeTextEntry3] : [facility] Cards: Thomas Hinds

## 2020-12-09 NOTE — SOCIAL HISTORY
[One fall no injury in past year] : Patient reported one fall in the past year without injury [Fully functional (bathing, dressing, toileting, transferring, walking, feeding)] : Fully functional (bathing, dressing, toileting, transferring, walking, feeding) [Independent] : using telephone [Some assistance needed] : managing medications [Full assistance needed] : managing finances [Walker] : walker [Safety elements used in home] : safety elements used in home [Grab Bars] : grab bars [Shower Chair] : shower chair [Anti-Slip Measures] : anti-slip measures [FreeTextEntry1] : Bernarda (24/7) [FreeTextEntry2] : Can assist with all needs [FreeTextEntry4] : Unclear. Has been more confused since hospitalization 2 months ago [de-identified] : some recent assistance w/ bathing, no assistance w/ toileting [de-identified] : Assistance w/ paying bills, laundry

## 2020-12-09 NOTE — HISTORY OF PRESENT ILLNESS
[FreeTextEntry1] : 89 year old woman w/ PMH PMR (on chronic steroids), internal hemorrhoids s/p previous banding, HTN, PAD s/p arterial bypass ~15 years ago, anemia, and July hospitalization for acute RLE DVT presents for f/u visit.\par \par Vasc Cards: Thomas Hinds\par Colorectal Sx: Dr. Lloyd Lynn\par Vasc Sx: Dr. Chucky Jonas\par Ophthalmology: Dr. Morrison ([place]) - [phone]\par \par Mild-Moderate Dementia: Patient is dependent on IADLs. MoCA 16/30 on 10/26/20. Patient and son are aware she has cognitive dysfunction. Patient had been withdrawn and anxious over the past few weeks. Had been perseverating on her cognitive dysfunction. She was started on sertraline 25 mg qd on last visit. Her Qtc is 472. BMP after starting SSRI w/ Na 136 (had prior hospitalization for hyponatremia, but this was suspected secondary to thiazide). Significant improvement in the interim. She is more engaged, happier, less focused on her impairment. \par \par Anemia: Hgb has been trickling downwards, ~8-9 on two recent CBCs. Has evidence of profound iron deficiency anemia but also likely B12 deficiency. B12 borderline low and MMA elevated on recent labs. She was started on PO FeSO4 and planning for B12 injection today. \par \par CKD III: Cr bumped to 1.54 on recent BMP. Previous range 1.2-1.5 over the past year. She has no other electrolyte abnormalities at this time. \par \par Epigastric Discomfort: Significant improvement since starting famotidine. It is resolved on today's visit. \par \par DVT: Spoke w/ Dr. Hinds 9/2020, repeat dopplers noted residual clot. Rec to c/w eliquis x at least 6 months. Repeat doppler 12/7/20 noted complete resolution of DVT. No LE DVT visualized. Plan to likely complete A/C 1/2021. \par \par PAD: Seen by vascular 2/2020. Noninvasive arterial physiological studies confirmed presence of moderate PAD in the right leg. Patient preferred conservative management. No ASA/plavix/cilostazol. \par \par PMR: Was on prednisone 5 mg TID for many years. Reviewed CRP and ESR, have been WNL for the past 5 years. Decreased dose to 9 mg daily last month. She has no reported pain in her hips or shoulders. \par \par Peripheral Neuropathy: Has intermittent "tingling" of her distal b/l feet, worst at b/l hallux. She notes at worst it is a "5/10" and does not interfere with ADLs. Not on treatment. It is self-limited. \par \par Hyponatremia: Na 136 on last BMP 11/2020 while on SSRI. Was hospitalized when Na 108 6/2020 in the setting of HCTZ use. Has normalized. \par \par HTN: On valsartan 160 mg qd. Has stopped HCTZ since hospitalization for hyponatremia. SBP is elevated on some visits. Improves on second BP check after she relaxes in the office.\par \par Moderate AS: Had TTE 9/28/20 due to audible murmur. Noted moderate AS and mild-mod MR. She has EF 70% w/ mild diastolic dysfunction. She has no MARCELO or PND, no LE edema. \par \par HCM:\par DEXA: ?\par Colonoscopy: N/A\par \par Immunizations:\par Pneumovax: 2005 (PCV 2015)\par Tdap: ?\par Flu: 2019\par Shingrix: ?she will check with pharmacy, unclear if given in the past

## 2020-12-09 NOTE — PHYSICAL EXAM
[General Appearance - Alert] : alert [General Appearance - In No Acute Distress] : in no acute distress [General Appearance - Well-Appearing] : healthy appearing [Sclera] : the sclera and conjunctiva were normal [PERRL With Normal Accommodation] : pupils were equal in size, round, and reactive to light [Extraocular Movements] : extraocular movements were intact [Normal Oral Mucosa] : normal oral mucosa [No Oral Pallor] : no oral pallor [No Oral Cyanosis] : no oral cyanosis [Outer Ear] : the ears and nose were normal in appearance [Oropharynx] : The oropharynx was normal [Neck Appearance] : the appearance of the neck was normal [Neck Cervical Mass (___cm)] : no neck mass was observed [Jugular Venous Distention Increased] : there was no jugular-venous distention [Respiration, Rhythm And Depth] : normal respiratory rhythm and effort [Auscultation Breath Sounds / Voice Sounds] : lungs were clear to auscultation bilaterally [Heart Rate And Rhythm] : heart rate was normal and rhythm regular [Heart Sounds Gallop] : no gallops [Bowel Sounds] : normal bowel sounds [Abdomen Soft] : soft [Abdomen Tenderness] : non-tender [] : no hepato-splenomegaly [Abdomen Mass (___ Cm)] : no abdominal mass palpated [Cervical Lymph Nodes Enlarged Posterior Bilaterally] : posterior cervical [Cervical Lymph Nodes Enlarged Anterior Bilaterally] : anterior cervical [No CVA Tenderness] : no ~M costovertebral angle tenderness [Musculoskeletal - Swelling] : no joint swelling seen [Cranial Nerves] : cranial nerves 2-12 were intact [Sensation] : the sensory exam was normal to light touch and pinprick [Motor Exam] : the motor exam was normal [No Focal Deficits] : no focal deficits [Impaired Insight] : insight and judgment were intact [Affect] : the affect was normal [Mood] : the mood was normal [FreeTextEntry1] : MoCA completed 10/26/20: 16/30, 1/5 on visuospatial, 2/3 naming, 4/6 attention, 3/3 language, 2/2 abstraction, 0/5 delayed recall, 4/6 orientation

## 2020-12-09 NOTE — CURRENT MEDS
[Medication Reconciliation Completed] : Medication reconciliation completed [High Risk Medications Reviewed] : High risk medications reviewed [ at pharmacy] :  at local pharmacy [Reports Changes In Medication (including OTC)] : Patient reports no changes in medication [] : does not miss any medication doses [de-identified] : LUISITO

## 2020-12-09 NOTE — ASSESSMENT
[FreeTextEntry1] : Reviewed MOLST w/ son/HCP, bridget Mg w/ SW. Son does not want to complete today, but notes that his mother would not want overly aggressive care and likely would want to be DNR. He will review MOLST further. Stressed we would like to complete ASAP, as he is leaving for [place] this winter and would like to have her wishes on MOLST in case of emergency.

## 2020-12-10 ENCOUNTER — NON-APPOINTMENT (OUTPATIENT)
Age: 85
End: 2020-12-10

## 2020-12-10 RX ORDER — APIXABAN 2.5 MG/1
2.5 TABLET, FILM COATED ORAL
Qty: 120 | Refills: 1 | Status: DISCONTINUED | COMMUNITY
Start: 2020-12-09 | End: 2020-12-10

## 2020-12-15 PROBLEM — Z87.09 HISTORY OF LARYNGITIS: Status: RESOLVED | Noted: 2017-04-21 | Resolved: 2020-12-15

## 2020-12-21 ENCOUNTER — NON-APPOINTMENT (OUTPATIENT)
Age: 85
End: 2020-12-21

## 2021-01-13 ENCOUNTER — TRANSCRIPTION ENCOUNTER (OUTPATIENT)
Age: 86
End: 2021-01-13

## 2021-01-22 LAB — DEPRECATED D DIMER PPP IA-ACNC: 839 NG/ML DDU

## 2021-01-28 ENCOUNTER — APPOINTMENT (OUTPATIENT)
Dept: CARDIOLOGY | Facility: CLINIC | Age: 86
End: 2021-01-28

## 2021-02-24 ENCOUNTER — OUTPATIENT (OUTPATIENT)
Dept: OUTPATIENT SERVICES | Facility: HOSPITAL | Age: 86
LOS: 1 days | Discharge: HOME | End: 2021-02-24

## 2021-02-24 DIAGNOSIS — D64.9 ANEMIA, UNSPECIFIED: ICD-10-CM

## 2021-03-01 ENCOUNTER — APPOINTMENT (OUTPATIENT)
Dept: HEMATOLOGY ONCOLOGY | Facility: CLINIC | Age: 86
End: 2021-03-01

## 2021-03-26 ENCOUNTER — APPOINTMENT (OUTPATIENT)
Dept: GERIATRICS | Facility: CLINIC | Age: 86
End: 2021-03-26
Payer: MEDICARE

## 2021-03-26 VITALS
HEIGHT: 59 IN | DIASTOLIC BLOOD PRESSURE: 62 MMHG | TEMPERATURE: 98 F | SYSTOLIC BLOOD PRESSURE: 144 MMHG | BODY MASS INDEX: 24.47 KG/M2 | WEIGHT: 121.38 LBS | HEART RATE: 80 BPM | RESPIRATION RATE: 15 BRPM | OXYGEN SATURATION: 97 %

## 2021-03-26 DIAGNOSIS — Z86.718 PERSONAL HISTORY OF OTHER VENOUS THROMBOSIS AND EMBOLISM: ICD-10-CM

## 2021-03-26 DIAGNOSIS — T73.0XXA STARVATION, INITIAL ENCOUNTER: ICD-10-CM

## 2021-03-26 DIAGNOSIS — H04.129 DRY EYE SYNDROME OF UNSPECIFIED LACRIMAL GLAND: ICD-10-CM

## 2021-03-26 DIAGNOSIS — R26.9 UNSPECIFIED ABNORMALITIES OF GAIT AND MOBILITY: ICD-10-CM

## 2021-03-26 PROCEDURE — 99214 OFFICE O/P EST MOD 30 MIN: CPT | Mod: GC

## 2021-03-26 RX ORDER — POLYVINYL ALCOHOL 14 MG/ML
1.4 SOLUTION/ DROPS OPHTHALMIC
Qty: 1 | Refills: 2 | Status: ACTIVE | COMMUNITY
Start: 2020-07-29

## 2021-03-26 RX ORDER — MECLIZINE HYDROCHLORIDE 12.5 MG/1
12.5 TABLET ORAL 3 TIMES DAILY
Qty: 60 | Refills: 0 | Status: COMPLETED | COMMUNITY
End: 2021-03-26

## 2021-03-26 NOTE — CURRENT MEDS
[Reports Changes In Medication (including OTC)] : Patient reports changes in medication [Medication Reconciliation Completed] : Medication reconciliation completed [High Risk Medications Reviewed] : High risk medications reviewed [ at pharmacy] :  at local pharmacy [] : does not miss any medication doses [de-identified] : see HPI [de-identified] : LUISITO

## 2021-03-26 NOTE — HISTORY OF PRESENT ILLNESS
[0] : 1) Little interest or pleasure doing things: Not at all [1] : 2) Feeling down, depressed, or hopeless for several days [PHQ-2 Score ___] : PHQ-2 Score [unfilled] [FreeTextEntry1] : 89 year old woman w/ PMH PMR (on chronic steroids), internal hemorrhoids s/p previous banding, HTN, PAD s/p arterial bypass ~15 years ago, and h/o RLE DVT  completed eliquis, repeat doppler Neg. She presents for follow up of chronic conditions . Accompanied by 2 caregivers \par \par Patient reports excessive eating - wakes up in the night hungry \par She reads and enjoys reading -\par Frustrated that she does not have her family around for which she says " i have no reason to be here" , but endorses her son is coming for a visit in a few weeks.  \par She is dependent on some ADL/ IADLs (finances, transportation)\par Referred for PT , however patient anxious about having to be in the same sapce as other people because of the pandemic.- Not interested at this time. \par \par Vasc Cards: Thomas Hinds\par Colorectal Sx: Dr. Lloyd Lynn\par Vasc Sx: Dr. Chucky Jonas\par Ophthalmology: Dr. Morrison ([place]) - [phone]\par \par Falls Screenin in past year (2020)\par \par

## 2021-03-26 NOTE — PHYSICAL EXAM
[General Appearance - Alert] : alert [General Appearance - In No Acute Distress] : in no acute distress [General Appearance - Well-Appearing] : healthy appearing [Sclera] : the sclera and conjunctiva were normal [PERRL With Normal Accommodation] : pupils were equal in size, round, and reactive to light [Extraocular Movements] : extraocular movements were intact [Normal Oral Mucosa] : normal oral mucosa [No Oral Pallor] : no oral pallor [No Oral Cyanosis] : no oral cyanosis [Outer Ear] : the ears and nose were normal in appearance [Oropharynx] : The oropharynx was normal [Neck Appearance] : the appearance of the neck was normal [Neck Cervical Mass (___cm)] : no neck mass was observed [Jugular Venous Distention Increased] : there was no jugular-venous distention [Respiration, Rhythm And Depth] : normal respiratory rhythm and effort [Auscultation Breath Sounds / Voice Sounds] : lungs were clear to auscultation bilaterally [Heart Rate And Rhythm] : heart rate was normal and rhythm regular [Heart Sounds Gallop] : no gallops [Edema] : there was no peripheral edema [Bowel Sounds] : normal bowel sounds [Abdomen Soft] : soft [Abdomen Tenderness] : non-tender [] : no hepato-splenomegaly [Abdomen Mass (___ Cm)] : no abdominal mass palpated [Cervical Lymph Nodes Enlarged Posterior Bilaterally] : posterior cervical [Cervical Lymph Nodes Enlarged Anterior Bilaterally] : anterior cervical [No CVA Tenderness] : no ~M costovertebral angle tenderness [Musculoskeletal - Swelling] : no joint swelling seen [Oriented To Time, Place, And Person] : oriented to person, place, and time [Impaired Insight] : insight and judgment were intact [Memory Recent] : recent memory was not impaired [Memory Remote] : remote memory was not impaired [FreeTextEntry1] : +kyphosis

## 2021-03-26 NOTE — REASON FOR VISIT
[Follow-Up] : a follow-up visit [Pre-Visit Preparation] : pre-visit preparation was done [Intercurrent Specialty/Sub-specialty Visits] : the patient has intercurrent specialty/sub-specialty visits [Formal Caregiver] : formal caregiver

## 2021-03-26 NOTE — ASSESSMENT
[Physical Therapy referral] : Physical Therapy referral [Daily physical exercise as tolerated] : Daily physical exercise as tolerated [FreeTextEntry1] : #PAD:\par Seen by vascular 2/2020. Noninvasive arterial physiological studies confirmed presence of moderate PAD in the right leg. Patient preferred conservative management. D/C'ed dipyridamole 25 BID during last visit, which she has been on for many years. No ASA/plavix/cilostazol. \par \par #PMR: \par On prednisone  being tapered \par PT referral made, however patient declining now . \par \par #HTN: On valsartan 160 mg qd. Has stopped HCTZ since hospitalization for hyponatremia.\par Monitor BP\par \par #Acute DVT:\par No residual DVT on dopplers. She has anemia and hemorrhoids w/ intermittent small bleeding. \par Risks > benefits of continued A/C\par Eliquis discontinued. \par \par #HCM:\par Colonoscopy: 2008\par Pneumovax: 2005 (PCV 2015)\par Flu: 2019\par Covid vaccine - completed 02/28/21\par Skin dryness: advised adequate moisturization\par \par

## 2021-03-26 NOTE — SOCIAL HISTORY
[One fall no injury in past year] : Patient reported one fall in the past year without injury [Fully functional (bathing, dressing, toileting, transferring, walking, feeding)] : Fully functional (bathing, dressing, toileting, transferring, walking, feeding) [Walker] : walker [Safety elements used in home] : safety elements used in home [Grab Bars] : grab bars [Shower Chair] : shower chair [Anti-Slip Measures] : anti-slip measures [FreeTextEntry1] : Bernarda (24/7) [FreeTextEntry2] : Can assist with all needs [FreeTextEntry4] : Unclear. Has been more confused since hospitalization 2 months ago [de-identified] : some recent assistance w/ bathing, no assistance w/ toileting [de-identified] : Assistance w/ paying bills, laundry

## 2021-03-26 NOTE — END OF VISIT
[] : Fellow [Time Spent: ___ minutes] : I have spent [unfilled] minutes of time on the encounter. [FreeTextEntry3] : 89 y/o woman w/ PMH as above. She is sad that her son has been in [place] past 3 months. Misses her family. Does regular activities w/ 24/7 HHA. Overall doing ok. She remains anxious about COVID, although had vaccine in February. Encouraged her to seek out more activities outside the home, including PT. She is not interested now, but would reconsider if caseload drops. Asked to RTC in one month.

## 2021-04-05 ENCOUNTER — RX RENEWAL (OUTPATIENT)
Age: 86
End: 2021-04-05

## 2021-04-07 LAB
ALBUMIN SERPL ELPH-MCNC: 3.2 G/DL
ALP BLD-CCNC: 45 U/L
ALT SERPL-CCNC: 6 U/L
ANION GAP SERPL CALC-SCNC: 10 MMOL/L
AST SERPL-CCNC: 11 U/L
BILIRUB SERPL-MCNC: 0.2 MG/DL
BUN SERPL-MCNC: 25 MG/DL
CALCIUM SERPL-MCNC: 9.8 MG/DL
CHLORIDE SERPL-SCNC: 102 MMOL/L
CO2 SERPL-SCNC: 22 MMOL/L
CREAT SERPL-MCNC: 1.27 MG/DL
FERRITIN SERPL-MCNC: 20 NG/ML
FOLATE SERPL-MCNC: 9.5 NG/ML
GLUCOSE SERPL-MCNC: 137 MG/DL
IRON SATN MFR SERPL: 17 %
IRON SERPL-MCNC: 49 UG/DL
METHYLMALONATE SERPL-SCNC: 294 NMOL/L
POTASSIUM SERPL-SCNC: 4.4 MMOL/L
PROT SERPL-MCNC: 5.1 G/DL
SODIUM SERPL-SCNC: 134 MMOL/L
TIBC SERPL-MCNC: 286 UG/DL
UIBC SERPL-MCNC: 237 UG/DL
VIT B12 SERPL-MCNC: 878 PG/ML

## 2021-04-23 NOTE — PROGRESS NOTE ADULT - PROBLEM SELECTOR PROBLEM 4
Polymyalgia rheumatica
as per chart, pt lives with daughter in private home, has HHA 8hr every day, owns all recommended DME

## 2021-05-14 ENCOUNTER — NON-APPOINTMENT (OUTPATIENT)
Age: 86
End: 2021-05-14

## 2021-05-18 ENCOUNTER — APPOINTMENT (OUTPATIENT)
Dept: GERIATRICS | Facility: CLINIC | Age: 86
End: 2021-05-18
Payer: MEDICARE

## 2021-05-18 VITALS
RESPIRATION RATE: 15 BRPM | OXYGEN SATURATION: 97 % | BODY MASS INDEX: 23.89 KG/M2 | HEIGHT: 59 IN | SYSTOLIC BLOOD PRESSURE: 160 MMHG | WEIGHT: 118.5 LBS | HEART RATE: 81 BPM | TEMPERATURE: 96.2 F | DIASTOLIC BLOOD PRESSURE: 80 MMHG

## 2021-05-18 DIAGNOSIS — R19.5 OTHER FECAL ABNORMALITIES: ICD-10-CM

## 2021-05-18 PROCEDURE — 99214 OFFICE O/P EST MOD 30 MIN: CPT | Mod: GC

## 2021-05-18 NOTE — ASSESSMENT
[Physical Therapy referral] : Physical Therapy referral [Daily physical exercise as tolerated] : Daily physical exercise as tolerated [FreeTextEntry1] : #HTN: On valsartan 160 mg qd. Has stopped HCTZ since hospitalization for hyponatremia.\par Monitor BP\par \par

## 2021-05-18 NOTE — PHYSICAL EXAM
[General Appearance - Alert] : alert [General Appearance - In No Acute Distress] : in no acute distress [General Appearance - Well-Appearing] : healthy appearing [Sclera] : the sclera and conjunctiva were normal [PERRL With Normal Accommodation] : pupils were equal in size, round, and reactive to light [Extraocular Movements] : extraocular movements were intact [Neck Appearance] : the appearance of the neck was normal [Neck Cervical Mass (___cm)] : no neck mass was observed [Jugular Venous Distention Increased] : there was no jugular-venous distention [Respiration, Rhythm And Depth] : normal respiratory rhythm and effort [Auscultation Breath Sounds / Voice Sounds] : lungs were clear to auscultation bilaterally [Heart Rate And Rhythm] : heart rate was normal and rhythm regular [Heart Sounds Gallop] : no gallops [Edema] : there was no peripheral edema [Bowel Sounds] : normal bowel sounds [Abdomen Soft] : soft [Abdomen Tenderness] : non-tender [] : no hepato-splenomegaly [Abdomen Mass (___ Cm)] : no abdominal mass palpated [Cervical Lymph Nodes Enlarged Posterior Bilaterally] : posterior cervical [Cervical Lymph Nodes Enlarged Anterior Bilaterally] : anterior cervical [No CVA Tenderness] : no ~M costovertebral angle tenderness [Musculoskeletal - Swelling] : no joint swelling seen [Oriented To Time, Place, And Person] : oriented to person, place, and time [Impaired Insight] : insight and judgment were intact [Memory Recent] : recent memory was not impaired [Memory Remote] : remote memory was not impaired [Normal Oral Mucosa] : normal oral mucosa [No Oral Pallor] : no oral pallor [No Oral Cyanosis] : no oral cyanosis [Outer Ear] : the ears and nose were normal in appearance [Oropharynx] : The oropharynx was normal [FreeTextEntry1] : oval full thickness wound at the upper shin, 1x1 cm, 1 mm deep, no signs of cellulitis, no warmth or erythema, granulation tissue seen

## 2021-05-18 NOTE — HISTORY OF PRESENT ILLNESS
[FreeTextEntry1] : 89 year old woman w/ PMH PMR (on chronic steroids), internal hemorrhoids s/p previous banding, HTN, PAD s/p arterial bypass ~15 years ago, and h/o RLE DVT  completed eliquis, repeat doppler Neg. She presents for acute visit for the left shin wound.  Accompanied by her HHA and the son Saurav and HHA Bernarda.\par \par Pt stated that she was going to see the dentist 3 weeks ago and suffered a fall injuring her left leg shin resulting in open wound. She was seen by the doctor at the dentist office but never saw a wound care specialist. the wound was not sutured due to the fragile skin but was dressed. The HHA is doing dressing with hydrogen peroxide and bacitracin since then. \par \par Pt does not report any fever or chills. No other systemic or local signs of infection. Pt however is on prednisone and has PAD making the wound healing delayed. Pt does have serous discharge coming out of the wound but no purulent discharge or foul smell. \par \par The HHA reported dark stools however the patient does not report dizziness, lightheadedness, fatigue and is not on any blood thinners. she is on iron pills. \par \par Vasc Cards: Thomas Hinds\par Colorectal Sx: Dr. Lloyd Lynn\par Vasc Sx: Dr. Chucky Jonas\par Ophthalmology: Dr. Morrison ([place]) - [phone]\par \par \par

## 2021-05-18 NOTE — CURRENT MEDS
[Reports Changes In Medication (including OTC)] : Patient reports changes in medication [Medication Reconciliation Completed] : Medication reconciliation completed [ at pharmacy] :  at local pharmacy [] : does not miss any medication doses [de-identified] : see HPI [de-identified] : LUISITO

## 2021-06-03 ENCOUNTER — LABORATORY RESULT (OUTPATIENT)
Age: 86
End: 2021-06-03

## 2021-06-04 ENCOUNTER — LABORATORY RESULT (OUTPATIENT)
Age: 86
End: 2021-06-04

## 2021-06-04 LAB
ANION GAP SERPL CALC-SCNC: 12 MMOL/L
BASOPHILS # BLD AUTO: 0.03 K/UL
BASOPHILS NFR BLD AUTO: 0.3 %
BUN SERPL-MCNC: 30 MG/DL
CALCIUM SERPL-MCNC: 9.5 MG/DL
CHLORIDE SERPL-SCNC: 97 MMOL/L
CO2 SERPL-SCNC: 20 MMOL/L
CREAT SERPL-MCNC: 1.04 MG/DL
EOSINOPHIL # BLD AUTO: 0.03 K/UL
EOSINOPHIL NFR BLD AUTO: 0.3 %
GLUCOSE SERPL-MCNC: 100 MG/DL
HCT VFR BLD CALC: 32.1 %
HGB BLD-MCNC: 10.1 G/DL
IMM GRANULOCYTES NFR BLD AUTO: 0.5 %
LYMPHOCYTES # BLD AUTO: 1.14 K/UL
LYMPHOCYTES NFR BLD AUTO: 10.6 %
MAN DIFF?: NORMAL
MCHC RBC-ENTMCNC: 29 PG
MCHC RBC-ENTMCNC: 31.5 GM/DL
MCV RBC AUTO: 92.2 FL
MONOCYTES # BLD AUTO: 0.39 K/UL
MONOCYTES NFR BLD AUTO: 3.6 %
NEUTROPHILS # BLD AUTO: 9.12 K/UL
NEUTROPHILS NFR BLD AUTO: 84.7 %
PLATELET # BLD AUTO: 365 K/UL
POTASSIUM SERPL-SCNC: 5.5 MMOL/L
RBC # BLD: 3.48 M/UL
RBC # FLD: 15 %
SODIUM SERPL-SCNC: 129 MMOL/L
WBC # FLD AUTO: 10.76 K/UL

## 2021-06-10 ENCOUNTER — LABORATORY RESULT (OUTPATIENT)
Age: 86
End: 2021-06-10

## 2021-06-10 LAB
ANION GAP SERPL CALC-SCNC: 11 MMOL/L
BUN SERPL-MCNC: 25 MG/DL
CALCIUM SERPL-MCNC: 9.6 MG/DL
CHLORIDE SERPL-SCNC: 100 MMOL/L
CO2 SERPL-SCNC: 21 MMOL/L
CREAT SERPL-MCNC: 1.19 MG/DL
GLUCOSE SERPL-MCNC: 87 MG/DL
METHYLMALONATE SERPL-SCNC: 253 NMOL/L
POTASSIUM SERPL-SCNC: 4.2 MMOL/L
SODIUM SERPL-SCNC: 133 MMOL/L
URATE SERPL-MCNC: 6.5 MG/DL

## 2021-06-11 LAB
APPEARANCE: CLEAR
BILIRUBIN URINE: NEGATIVE
BLOOD URINE: NEGATIVE
COLOR: COLORLESS
GLUCOSE QUALITATIVE U: NEGATIVE
KETONES URINE: NEGATIVE
LEUKOCYTE ESTERASE URINE: ABNORMAL
NITRITE URINE: NEGATIVE
OSMOLALITY UR: 221 MOSM/KG
PH URINE: 6.5
PROTEIN URINE: NEGATIVE
SPECIFIC GRAVITY URINE: 1.01
UROBILINOGEN URINE: NORMAL

## 2021-06-14 ENCOUNTER — APPOINTMENT (OUTPATIENT)
Dept: WOUND CARE | Facility: CLINIC | Age: 86
End: 2021-06-14

## 2021-06-16 ENCOUNTER — NON-APPOINTMENT (OUTPATIENT)
Age: 86
End: 2021-06-16

## 2021-07-19 ENCOUNTER — NON-APPOINTMENT (OUTPATIENT)
Age: 86
End: 2021-07-19

## 2021-07-19 ENCOUNTER — APPOINTMENT (OUTPATIENT)
Dept: GERIATRICS | Facility: CLINIC | Age: 86
End: 2021-07-19
Payer: MEDICARE

## 2021-07-19 DIAGNOSIS — G62.9 POLYNEUROPATHY, UNSPECIFIED: ICD-10-CM

## 2021-07-19 DIAGNOSIS — M79.604 PAIN IN RIGHT LEG: ICD-10-CM

## 2021-07-19 PROCEDURE — 99442: CPT | Mod: 95

## 2021-08-11 ENCOUNTER — APPOINTMENT (OUTPATIENT)
Dept: GERIATRICS | Facility: CLINIC | Age: 86
End: 2021-08-11
Payer: MEDICARE

## 2021-08-11 ENCOUNTER — RX RENEWAL (OUTPATIENT)
Age: 86
End: 2021-08-11

## 2021-08-11 VITALS
HEIGHT: 59 IN | SYSTOLIC BLOOD PRESSURE: 120 MMHG | DIASTOLIC BLOOD PRESSURE: 70 MMHG | OXYGEN SATURATION: 96 % | BODY MASS INDEX: 22.86 KG/M2 | WEIGHT: 113.38 LBS | RESPIRATION RATE: 14 BRPM | HEART RATE: 74 BPM

## 2021-08-11 DIAGNOSIS — E87.1 HYPO-OSMOLALITY AND HYPONATREMIA: ICD-10-CM

## 2021-08-11 DIAGNOSIS — D50.9 IRON DEFICIENCY ANEMIA, UNSPECIFIED: ICD-10-CM

## 2021-08-11 DIAGNOSIS — S81.802A UNSPECIFIED OPEN WOUND, LEFT LOWER LEG, INITIAL ENCOUNTER: ICD-10-CM

## 2021-08-11 DIAGNOSIS — K21.9 GASTRO-ESOPHAGEAL REFLUX DISEASE W/OUT ESOPHAGITIS: ICD-10-CM

## 2021-08-11 PROCEDURE — 99214 OFFICE O/P EST MOD 30 MIN: CPT

## 2021-08-11 RX ORDER — OXYCODONE 5 MG/1
5 TABLET ORAL EVERY 6 HOURS
Qty: 20 | Refills: 0 | Status: DISCONTINUED | COMMUNITY
Start: 2021-07-19 | End: 2021-08-11

## 2021-08-11 NOTE — HISTORY OF PRESENT ILLNESS
[FreeTextEntry1] : 89 year old woman w/ PMH PMR, mild-mod dementia, depression/anxiety, PAD s/p bypass >15 years ago, AS, multifactorial anemia and prior RLE DVT presents for f/u visit.\par \par Accompanied by HHABernarda, and son/HCP, Saurav Cooper.\par \par Patient's primary concern today is depression. HHA and son note she is very negative and often depressed. Is on sertraline 25 mg qd. Initially had good response, but has plateaued and even lost effectiveness since. She has some insight into her dementia. Gets anxious when she cannot remember things. Has had prior episodes of hyponatremia and is chronically ~133.\par \par Urinary Frequency: Likely psychogenic. Patient states (and HHA confirms) that she is nervous about having an accident and constantly states she has to use the bathroom. No actual incontinence. No dysuria, suprapubic pain, back pain, foul smelling urine.\par \par Anemia: Last Hgb 10.1. Has both Fe deficiency and B12 deficiency. Saw heme in the past. On supplementation. No reported melena or BRBPR (has h/o hemorrhoids).\par \par PMR: Steroids have been very slowly tapered. Down to 4 mg qd now, change to 3 mg in September.\par \par HTN: On valsartan. BP is controlled. Previously SBP > 170 off meds. \par \par Weight loss: Down 3 pounds. APpetite is good. No edema.\par \par AS: No SOB or edema. Euvolemic.\par \par GERD: Previously c/o "discomfort in her chest with swallowing" last year. Got better w/ famotidine.\par \par Family and patient's goals are for conservative treatment.

## 2021-08-11 NOTE — CURRENT MEDS
[Yes] : Reviewed medication list for presence of high-risk medications. [Patient/caregiver able to verbalize understanding of medications, including indications and side effects] : Patient/caregiver able to verbalize understanding of medications, including indications and side effects

## 2021-08-11 NOTE — PHYSICAL EXAM
[Alert] : alert [No Acute Distress] : in no acute distress [Sclera] : the sclera and conjunctiva were normal [EOMI] : extraocular movements were intact [Normal Outer Ear/Nose] : the ears and nose were normal in appearance [Normal Appearance] : the appearance of the neck was normal [Supple] : the neck was supple [No Respiratory Distress] : no respiratory distress [No Acc Muscle Use] : no accessory muscle use [Respiration, Rhythm And Depth] : normal respiratory rhythm and effort [Auscultation Breath Sounds / Voice Sounds] : lungs were clear to auscultation bilaterally [Heart Rate And Rhythm] : heart rate was normal and rhythm regular [Bowel Sounds] : normal bowel sounds [Abdomen Tenderness] : non-tender [Abdomen Soft] : soft [No Spinal Tenderness] : no spinal tenderness [Normal Color / Pigmentation] : normal skin color and pigmentation [Normal Turgor] : normal skin turgor [No Focal Deficits] : no focal deficits [de-identified] : frail, kyphotic [de-identified] : depressed mood

## 2021-08-11 NOTE — SOCIAL HISTORY
[With caregiver] : lives with caregiver [House] : in a house [FreeTextEntry1] : Bernarda [FreeTextEntry4] : 24/7 [facility]

## 2021-08-20 LAB
ALBUMIN SERPL ELPH-MCNC: 3.5 G/DL
ALP BLD-CCNC: 54 U/L
ALT SERPL-CCNC: 8 U/L
ANION GAP SERPL CALC-SCNC: 8 MMOL/L
AST SERPL-CCNC: 10 U/L
BASOPHILS # BLD AUTO: 0.05 K/UL
BASOPHILS NFR BLD AUTO: 0.6 %
BILIRUB SERPL-MCNC: 0.2 MG/DL
BUN SERPL-MCNC: 22 MG/DL
CALCIUM SERPL-MCNC: 10.1 MG/DL
CHLORIDE SERPL-SCNC: 100 MMOL/L
CO2 SERPL-SCNC: 24 MMOL/L
CREAT SERPL-MCNC: 1.05 MG/DL
EOSINOPHIL # BLD AUTO: 0.2 K/UL
EOSINOPHIL NFR BLD AUTO: 2.4 %
GLUCOSE SERPL-MCNC: 98 MG/DL
HCT VFR BLD CALC: 33.6 %
HGB BLD-MCNC: 10.4 G/DL
IMM GRANULOCYTES NFR BLD AUTO: 0.5 %
LYMPHOCYTES # BLD AUTO: 1.97 K/UL
LYMPHOCYTES NFR BLD AUTO: 23.3 %
MAN DIFF?: NORMAL
MCHC RBC-ENTMCNC: 29.3 PG
MCHC RBC-ENTMCNC: 31 GM/DL
MCV RBC AUTO: 94.6 FL
MONOCYTES # BLD AUTO: 0.86 K/UL
MONOCYTES NFR BLD AUTO: 10.2 %
NEUTROPHILS # BLD AUTO: 5.32 K/UL
NEUTROPHILS NFR BLD AUTO: 63 %
PLATELET # BLD AUTO: 344 K/UL
POTASSIUM SERPL-SCNC: 4.4 MMOL/L
PROT SERPL-MCNC: 5.4 G/DL
RBC # BLD: 3.55 M/UL
RBC # FLD: 14.4 %
SODIUM SERPL-SCNC: 132 MMOL/L
TSH SERPL-ACNC: 0.6 UIU/ML
WBC # FLD AUTO: 8.44 K/UL

## 2021-09-20 ENCOUNTER — RX RENEWAL (OUTPATIENT)
Age: 86
End: 2021-09-20

## 2021-09-29 ENCOUNTER — APPOINTMENT (OUTPATIENT)
Dept: GERIATRICS | Facility: CLINIC | Age: 86
End: 2021-09-29
Payer: MEDICARE

## 2021-09-29 VITALS
WEIGHT: 112.13 LBS | RESPIRATION RATE: 16 BRPM | OXYGEN SATURATION: 98 % | BODY MASS INDEX: 22.6 KG/M2 | TEMPERATURE: 97.7 F | SYSTOLIC BLOOD PRESSURE: 142 MMHG | HEART RATE: 84 BPM | HEIGHT: 59 IN | DIASTOLIC BLOOD PRESSURE: 80 MMHG

## 2021-09-29 PROCEDURE — G0008: CPT

## 2021-09-29 PROCEDURE — 90662 IIV NO PRSV INCREASED AG IM: CPT

## 2021-09-29 PROCEDURE — 99214 OFFICE O/P EST MOD 30 MIN: CPT | Mod: 25

## 2021-09-29 RX ORDER — SENNOSIDES 8.6 MG/1
8.6 CAPSULE, GELATIN COATED ORAL
Qty: 90 | Refills: 0 | Status: DISCONTINUED | COMMUNITY
Start: 2020-12-09 | End: 2021-09-29

## 2021-09-29 RX ORDER — PREDNISONE 5 MG/1
5 TABLET ORAL
Qty: 90 | Refills: 1 | Status: DISCONTINUED | COMMUNITY
Start: 2020-11-20 | End: 2021-09-29

## 2021-09-29 RX ORDER — FERROUS SULFATE TAB 325 MG (65 MG ELEMENTAL FE) 325 (65 FE) MG
325 (65 FE) TAB ORAL
Qty: 90 | Refills: 0 | Status: DISCONTINUED | COMMUNITY
Start: 2021-08-11 | End: 2021-09-29

## 2021-09-29 NOTE — PHYSICAL EXAM
[Alert] : alert [Sclera] : the sclera and conjunctiva were normal [EOMI] : extraocular movements were intact [Normal Outer Ear/Nose] : the ears and nose were normal in appearance [Normal Appearance] : the appearance of the neck was normal [Supple] : the neck was supple [No Respiratory Distress] : no respiratory distress [No Acc Muscle Use] : no accessory muscle use [Respiration, Rhythm And Depth] : normal respiratory rhythm and effort [Auscultation Breath Sounds / Voice Sounds] : lungs were clear to auscultation bilaterally [Bowel Sounds] : normal bowel sounds [Heart Rate And Rhythm] : heart rate was normal and rhythm regular [Abdomen Tenderness] : non-tender [Abdomen Soft] : soft [No Spinal Tenderness] : no spinal tenderness [Normal Turgor] : normal skin turgor [No Focal Deficits] : no focal deficits [de-identified] : frail, kyphotic [de-identified] : 4 cm diameter bruise R forearm, healing, no skin breakdown [de-identified] : depressed mood

## 2021-09-29 NOTE — SOCIAL HISTORY
[With caregiver] : lives with caregiver [House] : in a house [FreeTextEntry1] : Bernarda [FreeTextEntry4] : 24/7 [facility]

## 2021-09-29 NOTE — HISTORY OF PRESENT ILLNESS
[With Patient/Caregiver] : , with patient/caregiver [Reviewed no changes] : Reviewed, no changes [Designated Healthcare Proxy] : Designated healthcare proxy [Name: ___] : Health Care Proxy's Name: [unfilled]  [Relationship: ___] : Relationship: [unfilled] [FreeTextEntry1] : 89 year old woman w/ PMH PMR, mild-mod dementia, depression/anxiety, PAD s/p bypass >15 years ago, AS, multifactorial anemia and prior RLE DVT presents for f/u visit.\par \par Accompanied by Bernarda JORGE. Attempted to call son, Saurav, during the visit, but he did not answer the phone. \par \par Depression: Sertraline was increased to 50 mg on last visit 8/2021. QTc <400. Na 132 on CMP after increase in dose. She seems to be in better spirits today. She has had issues with perseverating on illness and often thinking she is "unwell." Today notes that she is "ok" but is "confused how well I feel." Bernarda notes she does still talk to herself at times and becomes anxious especially when the other aide is present. \par \par Fall: Had mechanical fall w/ HHA in the bathroom last week overnight. Had small bruise on R forearm from HHA lifting her up, small bruise on R tricep area that is healing, no other injuries, no current pain. Paramedics reportedly came to the home and did not transport her to the hospital. \par \par Urinary Frequency: Likely psychogenic. Patient states (and HHA confirms) that she is nervous about having an accident and constantly states she has to use the bathroom. No actual incontinence. No dysuria, suprapubic pain, back pain, foul smelling urine. Is wearing diapers. \par \par Anemia: Last Hgb 10.4 on 8/2021. Has both Fe deficiency and B12 deficiency. Saw heme in the past. On supplementation. No reported melena or BRBPR (has h/o hemorrhoids).\par \par PMR: Steroids have been very slowly tapered. Down to 4 mg qd now, change to 3 mg in October, should be off by January. \par \par HTN: On valsartan. BP is controlled usually, but has not yet taken her valsartan today. Previously SBP > 170 off meds. \par \par Weight loss: Stable weight today. APpetite is good. No edema.\par \par AS: No SOB or edema. Euvolemic.\par \par GERD: Previously c/o "discomfort in her chest with swallowing" last year. Got better w/ famotidine.\par \par Family and patient's goals are for conservative treatment. Has living will in our system. She needs a MOLST to be completed with her son.  [AdvancecareDate] : 09/21

## 2021-10-27 NOTE — PROGRESS NOTE ADULT - PROBLEM/PLAN-6
DISPLAY PLAN FREE TEXT
standing/walking/toileting

## 2021-12-27 NOTE — H&P ADULT - NSHPPHYSICALEXAM_GEN_ALL_CORE
PHYSICAL EXAM:  GENERAL: NAD, well-developed  HEAD:  Atraumatic, Normocephalic  EYES: EOMI, PERRLA, conjunctiva and sclera clear  NECK: Supple, No JVD  CHEST/LUNG: Clear to auscultation bilaterally; No wheeze  HEART: Regular rate and rhythm; No murmurs, rubs, or gallops  ABDOMEN: Soft, Nontender, Nondistended; Bowel sounds present  EXTREMITIES:  2+ Peripheral Pulses, No clubbing, cyanosis, or edema  PSYCH: AAOx3  NEUROLOGY: non-focal  SKIN: No rashes or lesions PHYSICAL EXAM:  GENERAL: NAD\  HEAD:  Atraumatic, Normocephalic, mucous membranes dry   EYES: EOMI, PERRL, conjunctiva and sclera clear  NECK: Supple, No JVD  CHEST/LUNG: Clear to auscultation bilaterally; No wheeze  HEART:S1 and S2, regular rate and rhythm; No murmurs, rubs, or gallops  ABDOMEN: Soft, Nontender, Nondistended; Bowel sounds present  EXTREMITIES: no lower extremity edema b/l, laceration w/b andage noted on left anterior shin. Ecchymoses on upper and lower extremities   PSYCH: AAOx1  NEUROLOGY: non-focal  SKIN: No rashes or lesions, some skin tenting [place] Counseling: I discussed with the patient the risks of risankizumab-rzaa including but not limited to immunosuppression, and serious infections.  The patient understands that monitoring is required including a PPD at baseline and must alert us or the primary physician if symptoms of infection or other concerning signs are noted.

## 2022-03-25 ENCOUNTER — APPOINTMENT (OUTPATIENT)
Dept: GERIATRICS | Facility: CLINIC | Age: 87
End: 2022-03-25
Payer: MEDICARE

## 2022-03-25 VITALS
HEIGHT: 59 IN | BODY MASS INDEX: 20.04 KG/M2 | DIASTOLIC BLOOD PRESSURE: 70 MMHG | OXYGEN SATURATION: 98 % | HEART RATE: 75 BPM | RESPIRATION RATE: 16 BRPM | SYSTOLIC BLOOD PRESSURE: 142 MMHG | TEMPERATURE: 98 F | WEIGHT: 99.38 LBS

## 2022-03-25 DIAGNOSIS — H40.9 UNSPECIFIED GLAUCOMA: ICD-10-CM

## 2022-03-25 PROCEDURE — 99214 OFFICE O/P EST MOD 30 MIN: CPT | Mod: GC

## 2022-03-25 RX ORDER — PREDNISONE 1 MG/1
1 TABLET ORAL
Qty: 360 | Refills: 0 | Status: DISCONTINUED | COMMUNITY
Start: 2020-11-20 | End: 2022-03-25

## 2022-03-25 NOTE — END OF VISIT
[] : Fellow [Time Spent: ___ minutes] : I have spent [unfilled] minutes of time on the encounter. [FreeTextEntry3] : 89 year old woman w/ PMH as above presents for f/u visit. Has not been seen x 6 months. Clinically, she is actually doing quite well. She is good spirits. HHAs accompany her and state she is "sharper" than usual. She has no LE edema. No GI concerns. Does feel sad at times when son is not present, but enjoys opportunities to spend time with him. Her appetite is reportedly excellent w/ good PO intake. She is still using prednisone 1 mg qd.\par \par However, patient has noted 13 pound weight loss in the last 6 months. Unintentional. Some of it is certainly resolution of edema, but she does appear frailer. We will check CBC, CMP, TSH. Goals are to remain less invasive. Will discuss further w/ patient's son/HCP, Saurav. I would recommend against advanced imaging pending labs, despite possibility of underlying malignancy.\par \par She did ask for referral to new ophthalmologist (last one retired) and given referral for derm for possible basal cell carcinoma of L cheek that appears new. \par \par RTC in 1-2 months for weight check.

## 2022-03-25 NOTE — HISTORY OF PRESENT ILLNESS
[Patient reported hearing was normal] : Patient reported hearing was normal [Patient reported vision is abnormal] : Patient reported vision is abnormal [Patient reported Patient reported dental screening is normal] : Patient reported dental screening is normal [No falls in past year] : Patient reported no falls in the past year [Completely Dependent] : Completely dependent. [0] : 1) Little interest or pleasure doing things: Not at all (0) [1] : 2) Feeling down, depressed, or hopeless for several days (1) [FreeTextEntry1] : 89 year old woman w/ PMH PMR, mild-mod dementia, depression/anxiety, PAD s/p bypass >15 years ago, AS, multifactorial anemia and prior RLE DVT presents for f/u visit.\par \par Accompanied by her two. today she is complaining of urinary frequency and constipation. \par \par Urinary Frequency: chronic complaint. denies any suprapubic pain, dysuria, bloody/smelly  urine. Has no burning with urination.\par Pt is  wearing diapers because she worries about having accidents. \par \par Constipation: has bowel movement every 2 days. Pt usually goes every day. Pt has hemorrhoid. She denies blood in stool. \par SHe is on metamucil. HHA's have senna at home which they can give. Pt is also taking iron every other day. \par \par \par Anemia: Last Hgb 10.4 on 8/2021. On iron. MCV 94.  Pt also has b12 def  and no TSH WNL\par \par Depression: Pt feels sad when son leaves. Son lives in [place]. she is on Sertraline  50 mg. Pt does not have friends that visit. she stays in doors mostly. she has good appetite. she has no thoughts/plan to hurt herself. \par \par Appetite: eating three meals plus several snacks. reports no nausea. she tolerates food with no problems. has no swallowing problems. \par Dental:  has her own teeth. saw dentist few months ago for cleaning and needs to see dentist again. \par Fall: no recent falls. \par Ambulation: uses walker and wheelchair when she leaves home. \par \par vision: requesting for optho referral. vision is poor.  she enjoys watching TV and wants to improve vision. \par Auditory: no issues with hearing.  Per the HHA, she sets TV audio/volume on adequate level. \par \par Weight loss: wt continues to decrease last visit in 9/2021  wt was 112 lbs . today wt 99 Lbs.  Appetite is good. No edema. \par \par PMR: tapering predisone. currenlty on 1mg daily.  pt not complaining of flares. \par \par HTN: On valsartan. BP is controlled.\par \par mentation: Per the HHAs that accompanied the pt, Her  mind is sharper and recall has improved since last visit in sept 2021. \par \par  [TextBox_37] : needs to see optho [FreeTextEntry2] : has lesion on rt cheek that should be looked at by derm to r/o skin cancer [FreeTextEntry4] : b

## 2022-03-25 NOTE — ASSESSMENT
[FreeTextEntry1] : -awatiing blood work results to discuss wt loss with son\par -RTC in may\par - Goal to complete MOLST next visit. \par

## 2022-03-25 NOTE — PHYSICAL EXAM
[Alert] : alert [No Acute Distress] : in no acute distress [Normal Voice/Communication] : normal voice/communication [Normal Oral Mucosa] : normal oral mucosa [No Respiratory Distress] : no respiratory distress [No Acc Muscle Use] : no accessory muscle use [Auscultation Breath Sounds / Voice Sounds] : lungs were clear to auscultation bilaterally [Respiration, Rhythm And Depth] : normal respiratory rhythm and effort [Normal S1, S2] : normal S1 and S2 [Heart Rate And Rhythm] : heart rate was normal and rhythm regular [Edema] : edema was not present [Normal Appearance] : normal in appearance [Breast Palpation Mass] : no palpable masses [No Nipple Discharge] : no nipple discharge [Bowel Sounds] : normal bowel sounds [Abdomen Tenderness] : non-tender [Abdomen Soft] : soft [No CVA Tenderness] : no CVA  tenderness [No Spinal Tenderness] : no spinal tenderness [Involuntary Movements] : no involuntary movements were seen [No Focal Deficits] : no focal deficits [Oriented To Time, Place, And Person] : oriented to person, place, and time [Normal Affect] : the affect was normal [Normal Gait] : abnormal gait [de-identified] : systolic murmur [de-identified] : NO palpable axiliary lymph nodes  [de-identified] : no suprapubic tenderness [de-identified] : kyphosis  [de-identified] : Motor 5/5 at uppor and lower ext. No sensory deficits.  [de-identified] : rough scaly lesion on left cheek

## 2022-03-31 ENCOUNTER — LABORATORY RESULT (OUTPATIENT)
Age: 87
End: 2022-03-31

## 2022-04-04 LAB
ALBUMIN SERPL ELPH-MCNC: 3.7 G/DL
ALP BLD-CCNC: 62 U/L
ALT SERPL-CCNC: 9 U/L
ANION GAP SERPL CALC-SCNC: 15 MMOL/L
AST SERPL-CCNC: 12 U/L
BILIRUB SERPL-MCNC: 0.2 MG/DL
BUN SERPL-MCNC: 32 MG/DL
CALCIUM SERPL-MCNC: 10.1 MG/DL
CHLORIDE SERPL-SCNC: 103 MMOL/L
CO2 SERPL-SCNC: 19 MMOL/L
CREAT SERPL-MCNC: 1.13 MG/DL
EGFR: 45 ML/MIN/1.73M2
GLUCOSE SERPL-MCNC: 97 MG/DL
POTASSIUM SERPL-SCNC: 5.1 MMOL/L
PROT SERPL-MCNC: 5.7 G/DL
SODIUM SERPL-SCNC: 136 MMOL/L

## 2022-07-15 ENCOUNTER — APPOINTMENT (OUTPATIENT)
Dept: GERIATRICS | Facility: CLINIC | Age: 87
End: 2022-07-15

## 2022-07-15 VITALS
DIASTOLIC BLOOD PRESSURE: 52 MMHG | RESPIRATION RATE: 15 BRPM | BODY MASS INDEX: 19.39 KG/M2 | TEMPERATURE: 98 F | OXYGEN SATURATION: 96 % | HEART RATE: 68 BPM | WEIGHT: 96 LBS | SYSTOLIC BLOOD PRESSURE: 138 MMHG

## 2022-07-15 DIAGNOSIS — E53.8 DEFICIENCY OF OTHER SPECIFIED B GROUP VITAMINS: ICD-10-CM

## 2022-07-15 DIAGNOSIS — L98.9 DISORDER OF THE SKIN AND SUBCUTANEOUS TISSUE, UNSPECIFIED: ICD-10-CM

## 2022-07-15 PROCEDURE — 99214 OFFICE O/P EST MOD 30 MIN: CPT

## 2022-07-15 RX ORDER — CHLORHEXIDINE GLUCONATE 4 %
1000 LIQUID (ML) TOPICAL DAILY
Qty: 90 | Refills: 2 | Status: DISCONTINUED | COMMUNITY
Start: 2021-01-19 | End: 2022-07-15

## 2022-07-15 NOTE — REASON FOR VISIT
[Follow-Up] : a follow-up visit [Formal Caregiver] : formal caregiver [FreeTextEntry3] : LUISITO Menchaca

## 2022-07-15 NOTE — REVIEW OF SYSTEMS
[Recent Weight Loss (___ Lbs)] : recent [unfilled] ~Ulb weight loss [Negative] : Endocrine [As Noted in HPI] : as noted in HPI

## 2022-07-15 NOTE — PHYSICAL EXAM
[Alert] : alert [Normal Voice/Communication] : normal voice/communication [Normal Oral Mucosa] : normal oral mucosa [No Respiratory Distress] : no respiratory distress [No Acc Muscle Use] : no accessory muscle use [Respiration, Rhythm And Depth] : normal respiratory rhythm and effort [Auscultation Breath Sounds / Voice Sounds] : lungs were clear to auscultation bilaterally [Normal S1, S2] : normal S1 and S2 [Heart Rate And Rhythm] : heart rate was normal and rhythm regular [Edema] : edema was not present [Normal Appearance] : normal in appearance [Breast Palpation Mass] : no palpable masses [No Nipple Discharge] : no nipple discharge [Bowel Sounds] : normal bowel sounds [Abdomen Tenderness] : non-tender [Abdomen Soft] : soft [No CVA Tenderness] : no CVA  tenderness [No Spinal Tenderness] : no spinal tenderness [Involuntary Movements] : no involuntary movements were seen [Normal Affect] : the affect was normal [Normal Gait] : abnormal gait [de-identified] : systolic murmur [de-identified] : NO palpable axiliary lymph nodes [de-identified] : no suprapubic tenderness [de-identified] : kyphosis  [de-identified] : Motor 5/5 at upper and lower ext. No sensory deficits.  [de-identified] : rough scaly lesion on left cheek. 2 cm seborrheic keratosis on L ankle. scattered senile purpura

## 2022-07-15 NOTE — HISTORY OF PRESENT ILLNESS
[Patient reported hearing was normal] : Patient reported hearing was normal [Patient reported vision is abnormal] : Patient reported vision is abnormal [Patient reported Patient reported dental screening is normal] : Patient reported dental screening is normal [No falls in past year] : Patient reported no falls in the past year [Completely Dependent] : Completely dependent. [0] : 1) Little interest or pleasure doing things: Not at all (0) [1] : 2) Feeling down, depressed, or hopeless for several days (1) [With Patient/Caregiver] : , with patient/caregiver [Reviewed updated] : Reviewed, updated [Designated Healthcare Proxy] : Designated healthcare proxy [Name: ___] : Health Care Proxy's Name: [unfilled]  [Relationship: ___] : Relationship: [unfilled] [FreeTextEntry1] : 89 year old woman w/ PMH PMR, mild-mod dementia, depression/anxiety, PAD s/p bypass >15 years ago, AS, multifactorial anemia and prior RLE DVT presents for f/u visit.\par \par Dermatologist: Dr. Freeman\par \par Overall doing ok. She states she feels well. Only concern she has is a "spot" on her left leg. Not new. \par \par Weight loss: Patient had lost 13 pounds from 9/21 to 3/22. 3 lbs last 4 months. Appetite and PO inta Labs in March noted mild anemia. d/w son/HCP, Saurav Cooper, at the time and noted goals were conservative and did not want to investigate further with imaging or invasive w/u.\par \par Urinary Frequency: chronic complaint. denies any suprapubic pain, dysuria, bloody/malodorous urine. Has no burning with urination.\par Pt is wearing diapers because she worries about having accidents. \par \par Constipation: Uses Senna PRN and metamucil. Has been regular. \par \par PMR: tapered predisone to 1 mg, but patient became symptomatic. She is currently taking 5 mg and reportedly doing "very well." \par \par Anemia: Last Hgb 10/7 3/2022, stable. B12 > 1000 on supplementation. \par \par Depression: Doing much better on sertraline 50 mg qd. Has been going outdoors more and enjoys it. \par \par Dental:  has her own teeth. saw dentist few months ago for cleaning and needs to see dentist again. \par Fall: no recent falls. \par Ambulation: uses walker and wheelchair when she leaves home. \par \par Auditory: no issues with hearing.  Per the HHA, she sets TV audio/volume on adequate level. \par \par HTN: On valsartan. BP is controlled.\par  [FreeTextEntry2] : pending f/u [AdvancecareDate] : 07/22
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
## 2022-08-01 ENCOUNTER — APPOINTMENT (OUTPATIENT)
Dept: GERIATRICS | Facility: CLINIC | Age: 87
End: 2022-08-01

## 2022-08-01 PROCEDURE — 99442: CPT | Mod: CS,95

## 2022-08-05 NOTE — ADDENDUM
[FreeTextEntry1] : Followed up with patient's son. States patient remains fatigued, likely due to COVID19. Has no SOB. Completing paxlovid tomorrow. Counseled if any worsening of sx to contact our office immediately.

## 2022-09-19 ENCOUNTER — RX RENEWAL (OUTPATIENT)
Age: 87
End: 2022-09-19

## 2022-10-25 ENCOUNTER — APPOINTMENT (OUTPATIENT)
Dept: GERIATRICS | Facility: CLINIC | Age: 87
End: 2022-10-25

## 2022-10-25 PROCEDURE — 99448 NTRPROF PH1/NTRNET/EHR 21-30: CPT | Mod: CS

## 2022-11-15 NOTE — PROVIDER CONTACT NOTE (CRITICAL VALUE NOTIFICATION) - NAME OF MD/NP/PA/DO NOTIFIED:
dr. Maria
Dr. Carmen
Dr. Maria
Dr. Pastor
Dr. Pastor
Rene Lui
dr. Maria
Cheiloplasty (Less Than 50%) Text: A decision was made to reconstruct the defect with a  cheiloplasty.  The defect was undermined extensively.  Additional orbicularis oris muscle was excised with a 15 blade scalpel.  The defect was converted into a full thickness wedge, of less than 50% of the vertical height of the lip, to facilite a better cosmetic result.  Small vessels were then tied off with 5-0 monocyrl. The orbicularis oris, superficial fascia, adipose and dermis were then reapproximated.  After the deeper layers were approximated the epidermis was reapproximated with particular care given to realign the vermilion border.

## 2022-11-18 ENCOUNTER — APPOINTMENT (OUTPATIENT)
Dept: GERIATRICS | Facility: CLINIC | Age: 87
End: 2022-11-18

## 2022-11-18 VITALS
SYSTOLIC BLOOD PRESSURE: 154 MMHG | OXYGEN SATURATION: 97 % | WEIGHT: 105 LBS | RESPIRATION RATE: 16 BRPM | HEART RATE: 74 BPM | HEIGHT: 55 IN | BODY MASS INDEX: 24.3 KG/M2 | DIASTOLIC BLOOD PRESSURE: 60 MMHG

## 2022-11-18 DIAGNOSIS — R63.4 ABNORMAL WEIGHT LOSS: ICD-10-CM

## 2022-11-18 PROCEDURE — G0008: CPT

## 2022-11-18 PROCEDURE — 90662 IIV NO PRSV INCREASED AG IM: CPT

## 2022-11-18 PROCEDURE — 99214 OFFICE O/P EST MOD 30 MIN: CPT | Mod: CS

## 2022-11-18 RX ORDER — NIRMATRELVIR AND RITONAVIR 300-100 MG
20 X 150 MG & KIT ORAL
Qty: 1 | Refills: 0 | Status: DISCONTINUED | COMMUNITY
Start: 2022-08-01 | End: 2022-11-18

## 2022-11-18 NOTE — REVIEW OF SYSTEMS
[Recent Weight Loss (___ Lbs)] : recent [unfilled] ~Ulb weight loss [As Noted in HPI] : as noted in HPI [Negative] : Endocrine [Recent Weight Gain (___ Lbs)] : recent [unfilled] ~Ulb weight gain

## 2022-11-18 NOTE — PHYSICAL EXAM
[Alert] : alert [Normal Voice/Communication] : normal voice/communication [Normal Oral Mucosa] : normal oral mucosa [No Respiratory Distress] : no respiratory distress [No Acc Muscle Use] : no accessory muscle use [Respiration, Rhythm And Depth] : normal respiratory rhythm and effort [Auscultation Breath Sounds / Voice Sounds] : lungs were clear to auscultation bilaterally [Normal S1, S2] : normal S1 and S2 [Heart Rate And Rhythm] : heart rate was normal and rhythm regular [Edema] : edema was not present [No Nipple Discharge] : no nipple discharge [Bowel Sounds] : normal bowel sounds [Abdomen Tenderness] : non-tender [Abdomen Soft] : soft [No CVA Tenderness] : no CVA  tenderness [No Spinal Tenderness] : no spinal tenderness [Involuntary Movements] : no involuntary movements were seen [Normal Affect] : the affect was normal [Systolic grade ___/6] : A grade [unfilled]/6 systolic murmur was heard. [Normal Gait] : abnormal gait [de-identified] : systolic murmur [de-identified] : no suprapubic tenderness [de-identified] : kyphosis  [de-identified] : Motor 5/5 at upper and lower ext. No sensory deficits.  [de-identified] : rough scaly lesion on left cheek. 2 cm seborrheic keratosis on L ankle. scattered senile purpura. healed skin tear (scarred) RLE

## 2022-11-18 NOTE — HISTORY OF PRESENT ILLNESS
[Patient reported hearing was normal] : Patient reported hearing was normal [Patient reported vision is abnormal] : Patient reported vision is abnormal [Patient reported Patient reported dental screening is normal] : Patient reported dental screening is normal [No falls in past year] : Patient reported no falls in the past year [Completely Dependent] : Completely dependent. [0] : 1) Little interest or pleasure doing things: Not at all (0) [1] : 2) Feeling down, depressed, or hopeless for several days (1) [With Patient/Caregiver] : , with patient/caregiver [Reviewed updated] : Reviewed, updated [Designated Healthcare Proxy] : Designated healthcare proxy [Name: ___] : Health Care Proxy's Name: [unfilled]  [Relationship: ___] : Relationship: [unfilled] [FreeTextEntry1] : 89 year old woman w/ PMH PMR, mild-mod dementia, depression/anxiety, PAD s/p bypass >15 years ago, AS, multifactorial anemia and prior RLE DVT presents for f/u visit.\par \par Dermatologist: Dr. Freeman\par \par Since last visit, patient had COVID19 twice. First 8/2022 was treated with low dose paxlovid (borderline GFR) and recovered well. Tested positive again 10/25/22 w/ only sx rhinorrhea. Was referred for mAb at that time. Recovered quite well. HHA note no residual sx. \par \par Patient has had a few skin tears since our last visit (RLE and R forearm) that have healed in the interim. \par \par Weight loss: Patient had lost 13 pounds from 9/21 to 3/22. Now weight is up 9 lbs last 4 months. Appetite better and using boost once a day PO. Labs in March noted mild anemia. d/w son/HCP, Saurav Cooper, at the time and noted goals were conservative and did not want to investigate further with imaging or invasive w/u.\par \par Urinary Frequency: chronic complaint, although no concerns stated today. denies any suprapubic pain, dysuria, bloody/malodorous urine. Has no burning with urination.\par Pt is wearing diapers because she worries about having accidents. \par \par Constipation: Uses Senna PRN and metamucil. Has been regular. \par \par PMR: tapered prednisone to 1 mg, but patient became symptomatic. She is currently taking 5 mg and reportedly doing "very well." \par \par Anemia: Last Hgb 10/7 3/2022, stable. B12 > 1000 on supplementation. \par \par Depression: Doing much better on sertraline 50 mg qd. Has been going outdoors more and enjoys it. \par \par Dental: has her own teeth. saw dentist 2022 for cleaning.\par \par Ambulation: uses walker and wheelchair when she leaves home. \par \par Auditory: no issues with hearing.  Per the HHA, she sets TV audio/volume on adequate level. \par \par HTN: On valsartan. BP is controlled.\par  [FreeTextEntry2] : pending f/u [AdvancecareDate] : 07/22

## 2022-12-08 ENCOUNTER — LABORATORY RESULT (OUTPATIENT)
Age: 87
End: 2022-12-08

## 2022-12-09 LAB
T3 SERPL-MCNC: 67 NG/DL
T4 FREE SERPL-MCNC: 0.9 NG/DL

## 2023-04-06 ENCOUNTER — APPOINTMENT (OUTPATIENT)
Dept: OPHTHALMOLOGY | Facility: CLINIC | Age: 88
End: 2023-04-06
Payer: MEDICARE

## 2023-04-06 ENCOUNTER — NON-APPOINTMENT (OUTPATIENT)
Age: 88
End: 2023-04-06

## 2023-04-06 PROCEDURE — 92004 COMPRE OPH EXAM NEW PT 1/>: CPT

## 2023-04-06 PROCEDURE — 92015 DETERMINE REFRACTIVE STATE: CPT

## 2023-04-06 PROCEDURE — 92286 ANT SGM IMG I&R SPECLR MIC: CPT

## 2023-04-06 PROCEDURE — 92202 OPSCPY EXTND ON/MAC DRAW: CPT

## 2023-04-07 ENCOUNTER — APPOINTMENT (OUTPATIENT)
Dept: GERIATRICS | Facility: CLINIC | Age: 88
End: 2023-04-07
Payer: MEDICARE

## 2023-04-07 VITALS
HEIGHT: 55 IN | OXYGEN SATURATION: 99 % | SYSTOLIC BLOOD PRESSURE: 142 MMHG | DIASTOLIC BLOOD PRESSURE: 62 MMHG | HEART RATE: 76 BPM | RESPIRATION RATE: 16 BRPM | WEIGHT: 105.13 LBS | BODY MASS INDEX: 24.33 KG/M2 | TEMPERATURE: 97.3 F

## 2023-04-07 PROCEDURE — 99214 OFFICE O/P EST MOD 30 MIN: CPT | Mod: GC

## 2023-04-07 NOTE — PHYSICAL EXAM
[Alert] : alert [No Acute Distress] : in no acute distress [No Respiratory Distress] : no respiratory distress [Respiration, Rhythm And Depth] : normal respiratory rhythm and effort [Auscultation Breath Sounds / Voice Sounds] : lungs were clear to auscultation bilaterally [Normal S1, S2] : normal S1 and S2 [Heart Rate And Rhythm] : heart rate was normal and rhythm regular [Bowel Sounds] : normal bowel sounds [Abdomen Tenderness] : non-tender [Abdomen Soft] : soft [Sensation] : the sensory exam was normal to light touch and pinprick [No Focal Deficits] : no focal deficits [Motor Exam] : the motor exam was normal [Normal Affect] : the affect was normal [Normal Mood] : the mood was normal

## 2023-04-07 NOTE — END OF VISIT
[] : Fellow [Time Spent: ___ minutes] : I have spent [unfilled] minutes of time on the encounter. [FreeTextEntry3] : 89 year old woman w/ PMH as above presents for f/u visit. She is overall doing quite well. HHA states she is "100/100". I spoke with son, Saurav, over the phone. He speaks with her daily and states that she is "doing even better than before." \par \par Her weight is stable. There are no new wounds. She feels ok with no new concerns today.\par \par We will not change medications. Last labs 12/2022, no urgent need to repeat.\par \par She will RTC in ~3 months.

## 2023-04-07 NOTE — REASON FOR VISIT
[Follow-Up] : a follow-up visit [Other: _____] : [unfilled] [FreeTextEntry1] : needs refiils  [FreeTextEntry2] : LUISITO Owen

## 2023-04-07 NOTE — HISTORY OF PRESENT ILLNESS
[No falls in past year] : Patient reported no falls in the past year [] : Patient is [place]. [Completely Dependent] : Completely dependent. [Walker] : walker [Wheelchair] : wheelchair [With Patient/Caregiver] : , with patient/caregiver [Reviewed updated] : Reviewed, updated [Designated Healthcare Proxy] : Designated healthcare proxy [Name: ___] : Health Care Proxy's Name: [unfilled]  [Relationship: ___] : Relationship: [unfilled] [FreeTextEntry1] : 89 year old woman w/ PMH PMR, mild-mod dementia, depression/anxiety, PAD s/p bypass >15 years ago, AS, multifactorial anemia and prior RLE DVT presents for f/u visit.\par \par Dermatologist: Dr. Freeman\par \par Hx of covid x2 in 8/2022 s/p paxlovid , 10/25/22 s/p mAb\par \par Dry skin: Previous visit pt has RUE and R forearm skin tears. Healed. Pt used Vaseline  \par \par Weight loss: Patient had lost 13 pounds from 9/21 to 3/22. weight stable since November. Previous visit  d/w son/HCP, Saurav Cooper, at the time and noted goals were conservative and did not want to investigate further with imaging or invasive w/u.\par \par Urinary Frequency: chronic complaint, reports nocturia, pt drinks water at night. denies any suprapubic pain, dysuria, bloody/malodorous urine. Has no burning with urination.  Pt is wearing diapers because she worries about having accidents. \par \par Constipation: Uses Senna PRN and metamucil. Has been regular every other day\par \par PMR: tapered prednisone to 1 mg, but patient became symptomatic. She is currently taking 5 mg and reportedly doing "very well." \par \par Anemia: Last Hgb 10.5 12/2022, stable. B12 > 1000 on supplementation. \par \par Depression: Pt mood stable on sertraline 50 mg qd. Has been going outdoors more and enjoys it. \par \par Dental: has her own teeth. saw dentist 2022 for cleaning.\par \par Ambulation: uses walker and wheelchair when she leaves home. \par \par Auditory: no issues with hearing. Per the HHA, she sets TV audio/volume on adequate level. \par \par HTN: On valsartan. BP is controlled.\par  [AdvancecareDate] : 07/22

## 2023-10-02 RX ORDER — PSYLLIUM SEED (WITH DEXTROSE)
28.3 POWDER (GRAM) ORAL
Refills: 0 | Status: DISCONTINUED | COMMUNITY
Start: 2020-07-29 | End: 2023-10-02

## 2023-10-02 RX ORDER — HYDROCORTISONE 25 MG/G
2.5 CREAM TOPICAL
Qty: 1 | Refills: 5 | Status: DISCONTINUED | COMMUNITY
Start: 2020-08-04 | End: 2023-10-02

## 2023-11-13 ENCOUNTER — NON-APPOINTMENT (OUTPATIENT)
Age: 88
End: 2023-11-13

## 2023-11-13 ENCOUNTER — APPOINTMENT (OUTPATIENT)
Dept: OPHTHALMOLOGY | Facility: CLINIC | Age: 88
End: 2023-11-13
Payer: MEDICARE

## 2023-11-13 PROCEDURE — 92004 COMPRE OPH EXAM NEW PT 1/>: CPT

## 2023-12-15 ENCOUNTER — APPOINTMENT (OUTPATIENT)
Dept: GERIATRICS | Facility: CLINIC | Age: 88
End: 2023-12-15
Payer: MEDICARE

## 2023-12-15 VITALS
SYSTOLIC BLOOD PRESSURE: 170 MMHG | HEART RATE: 80 BPM | OXYGEN SATURATION: 98 % | DIASTOLIC BLOOD PRESSURE: 69 MMHG | BODY MASS INDEX: 25.26 KG/M2 | HEIGHT: 55 IN | WEIGHT: 109.13 LBS | TEMPERATURE: 97.9 F

## 2023-12-15 DIAGNOSIS — M35.3 POLYMYALGIA RHEUMATICA: ICD-10-CM

## 2023-12-15 DIAGNOSIS — I10 ESSENTIAL (PRIMARY) HYPERTENSION: ICD-10-CM

## 2023-12-15 PROCEDURE — 99214 OFFICE O/P EST MOD 30 MIN: CPT

## 2023-12-15 NOTE — ASSESSMENT
[FreeTextEntry1] : Plan of care discussed with patient and her aide in detail.  Will speak with patient's son when lab results return.  Return to clinic in 3 to 6 months

## 2023-12-15 NOTE — HISTORY OF PRESENT ILLNESS
[Patient reported hearing was normal] : Patient reported hearing was normal [Patient reported vision is abnormal] : Patient reported vision is abnormal [Patient reported Patient reported dental screening is normal] : Patient reported dental screening is normal [No falls in past year] : Patient reported no falls in the past year [Completely Dependent] : Completely dependent. [With Patient/Caregiver] : , with patient/caregiver [Reviewed updated] : Reviewed, updated [Designated Healthcare Proxy] : Designated healthcare proxy [Name: ___] : Health Care Proxy's Name: [unfilled]  [Relationship: ___] : Relationship: [unfilled] [Patient reported skin cancer screening was abnormal] : Patient reported skin cancer screening was abnormal [FreeTextEntry1] : 11/23 [0] : 2) Feeling down, depressed, or hopeless: Not at all (0) [PHQ-2 Negative - No further assessment needed] : PHQ-2 Negative - No further assessment needed [[id number]] : 0 [AdvancecareDate] : 12/23

## 2023-12-15 NOTE — REVIEW OF SYSTEMS
[Recent Weight Gain (___ Lbs)] : recent [unfilled] ~Ulb weight gain [As Noted in HPI] : as noted in HPI [Negative] : Endocrine

## 2023-12-15 NOTE — PHYSICAL EXAM
[Alert] : alert [Normal Voice/Communication] : normal voice/communication [Normal Oral Mucosa] : normal oral mucosa [No Respiratory Distress] : no respiratory distress [No Acc Muscle Use] : no accessory muscle use [Respiration, Rhythm And Depth] : normal respiratory rhythm and effort [Auscultation Breath Sounds / Voice Sounds] : lungs were clear to auscultation bilaterally [Normal S1, S2] : normal S1 and S2 [Heart Rate And Rhythm] : heart rate was normal and rhythm regular [Systolic grade ___/6] : A grade [unfilled]/6 systolic murmur was heard. [Edema] : edema was not present [No Nipple Discharge] : no nipple discharge [Bowel Sounds] : normal bowel sounds [Abdomen Tenderness] : non-tender [Abdomen Soft] : soft [No CVA Tenderness] : no CVA  tenderness [No Spinal Tenderness] : no spinal tenderness [Involuntary Movements] : no involuntary movements were seen [Normal Affect] : the affect was normal [Normal Gait] : abnormal gait [Normal Mood] : the mood was normal [de-identified] : systolic murmur [de-identified] : no suprapubic tenderness [de-identified] : kyphosis  [de-identified] : rough scaly lesion on left cheek mostly unchanged. 2 cm seborrheic keratosis on L ankle. scattered senile purpura. healed skin tear (scarred) RLE

## 2023-12-18 ENCOUNTER — LABORATORY RESULT (OUTPATIENT)
Age: 88
End: 2023-12-18

## 2023-12-26 LAB
ALBUMIN SERPL ELPH-MCNC: 3.6 G/DL
ALP BLD-CCNC: 52 U/L
ALT SERPL-CCNC: 9 U/L
ANION GAP SERPL CALC-SCNC: 9 MMOL/L
AST SERPL-CCNC: 13 U/L
BASOPHILS # BLD AUTO: 0.03 K/UL
BASOPHILS NFR BLD AUTO: 0.3 %
BILIRUB SERPL-MCNC: 0.2 MG/DL
BUN SERPL-MCNC: 48 MG/DL
CALCIUM SERPL-MCNC: 9.7 MG/DL
CHLORIDE SERPL-SCNC: 108 MMOL/L
CO2 SERPL-SCNC: 21 MMOL/L
CREAT SERPL-MCNC: 1.23 MG/DL
EGFR: 40 ML/MIN/1.73M2
EOSINOPHIL # BLD AUTO: 0.06 K/UL
EOSINOPHIL NFR BLD AUTO: 0.6 %
GLUCOSE SERPL-MCNC: 120 MG/DL
HCT VFR BLD CALC: 32.6 %
HGB BLD-MCNC: 9.9 G/DL
IMM GRANULOCYTES NFR BLD AUTO: 0.5 %
LYMPHOCYTES # BLD AUTO: 1.02 K/UL
LYMPHOCYTES NFR BLD AUTO: 9.9 %
MAN DIFF?: NORMAL
MCHC RBC-ENTMCNC: 30.1 PG
MCHC RBC-ENTMCNC: 30.4 GM/DL
MCV RBC AUTO: 99.1 FL
MONOCYTES # BLD AUTO: 0.32 K/UL
MONOCYTES NFR BLD AUTO: 3.1 %
NEUTROPHILS # BLD AUTO: 8.87 K/UL
NEUTROPHILS NFR BLD AUTO: 85.6 %
PLATELET # BLD AUTO: 257 K/UL
POTASSIUM SERPL-SCNC: 5.5 MMOL/L
PROT SERPL-MCNC: 5.4 G/DL
RBC # BLD: 3.29 M/UL
RBC # FLD: 14.1 %
SODIUM SERPL-SCNC: 139 MMOL/L
TSH SERPL-ACNC: 0.1 UIU/ML
WBC # FLD AUTO: 10.35 K/UL

## 2024-03-20 NOTE — END OF VISIT
Fellow [Time Spent: ___ minutes] : I have spent [unfilled] minutes of time on the encounter. [FreeTextEntry3] : 89 y/o woman w/ PMH cognitive impairment, PVD, and PMR (on chronic steroids) presents for acute visit c/o non-healing skin tear on LLE. Accompanied by HHABernarda, and son, Saurav, who provide collateral hx. Patient had mechanical fall three weeks ago, sustained deep full thickness skin tear on anterior aspect of L shin. On exam, wound is ~1x1cm, full thickness tear w/ some serous drainage. No pus or bleeding. No pain, no surrounding cellulitic change. +granulation tissue. HHA states it is improving, although no pictures from past 2 weeks for visual comparison. There is no evidence of active infection. She is at risk for slow wound healing due to chronic steroids, PVD. Rec to use foam dressing, can change q24h or q12h depending on drainage, which should help improve granulation tissue formation and remove exudate. Given wound care referral to see Dr. Faith for additional recommendations.\par \par Emphasized and explained prednisone taper again today. Needs to decrease by 1 mg per month. HHA and son voiced understanding.\par \par Dark stools but no evidence of loose stools, not c/w melena. Remains on iron. Does have h/o multifactorial anemia, however, with no recent labs. Will check CBC, B12, MMA, BMP (home draw). Rec f/u in 1-2 months.

## 2024-04-09 ENCOUNTER — RX RENEWAL (OUTPATIENT)
Age: 89
End: 2024-04-09

## 2024-04-10 ENCOUNTER — RX RENEWAL (OUTPATIENT)
Age: 89
End: 2024-04-10

## 2024-04-10 RX ORDER — PREDNISONE 5 MG/1
5 TABLET ORAL
Qty: 90 | Refills: 0 | Status: ACTIVE | COMMUNITY
Start: 2022-07-15 | End: 1900-01-01

## 2024-05-17 ENCOUNTER — APPOINTMENT (OUTPATIENT)
Dept: GERIATRICS | Facility: CLINIC | Age: 89
End: 2024-05-17
Payer: MEDICARE

## 2024-05-17 VITALS
TEMPERATURE: 97.9 F | HEIGHT: 55 IN | WEIGHT: 113.5 LBS | HEART RATE: 84 BPM | SYSTOLIC BLOOD PRESSURE: 163 MMHG | OXYGEN SATURATION: 95 % | BODY MASS INDEX: 26.27 KG/M2 | DIASTOLIC BLOOD PRESSURE: 56 MMHG

## 2024-05-17 DIAGNOSIS — I35.0 NONRHEUMATIC AORTIC (VALVE) STENOSIS: ICD-10-CM

## 2024-05-17 DIAGNOSIS — F41.8 OTHER SPECIFIED ANXIETY DISORDERS: ICD-10-CM

## 2024-05-17 DIAGNOSIS — F03.A0 UNSPECIFIED DEMENTIA, MILD, WITHOUT BEHAVIORAL DISTURBANCE, PSYCHOTIC DISTURBANCE, MOOD DISTURBANCE, AND ANXIETY: ICD-10-CM

## 2024-05-17 DIAGNOSIS — D64.9 ANEMIA, UNSPECIFIED: ICD-10-CM

## 2024-05-17 PROCEDURE — 99214 OFFICE O/P EST MOD 30 MIN: CPT

## 2024-05-17 PROCEDURE — G2211 COMPLEX E/M VISIT ADD ON: CPT

## 2024-05-17 RX ORDER — SERTRALINE HYDROCHLORIDE 50 MG/1
50 TABLET, FILM COATED ORAL DAILY
Qty: 90 | Refills: 1 | Status: ACTIVE | COMMUNITY
Start: 2020-11-18 | End: 1900-01-01

## 2024-05-17 RX ORDER — FAMOTIDINE 20 MG/1
20 TABLET, FILM COATED ORAL
Qty: 90 | Refills: 1 | Status: ACTIVE | COMMUNITY
Start: 2020-10-19 | End: 1900-01-01

## 2024-05-17 RX ORDER — MULTIVIT-MIN/FOLIC/VIT K/LYCOP 400-300MCG
25 MCG TABLET ORAL DAILY
Qty: 90 | Refills: 1 | Status: ACTIVE | COMMUNITY
Start: 2020-12-09 | End: 1900-01-01

## 2024-05-17 RX ORDER — VIT C/E/ZN/COPPR/LUTEIN/ZEAXAN 250MG-90MG
CAPSULE ORAL
Qty: 90 | Refills: 1 | Status: ACTIVE | COMMUNITY
Start: 2020-07-29 | End: 1900-01-01

## 2024-05-17 RX ORDER — CHLORHEXIDINE GLUCONATE 4 %
325 (65 FE) LIQUID (ML) TOPICAL
Qty: 45 | Refills: 1 | Status: ACTIVE | COMMUNITY
Start: 2020-11-20 | End: 1900-01-01

## 2024-05-17 NOTE — PHYSICAL EXAM
[Alert] : alert [Normal Voice/Communication] : normal voice/communication [Normal Oral Mucosa] : normal oral mucosa [No Respiratory Distress] : no respiratory distress [No Acc Muscle Use] : no accessory muscle use [Respiration, Rhythm And Depth] : normal respiratory rhythm and effort [Auscultation Breath Sounds / Voice Sounds] : lungs were clear to auscultation bilaterally [Normal S1, S2] : normal S1 and S2 [Heart Rate And Rhythm] : heart rate was normal and rhythm regular [Systolic grade ___/6] : A grade [unfilled]/6 systolic murmur was heard. [Edema] : edema was not present [No Nipple Discharge] : no nipple discharge [Bowel Sounds] : normal bowel sounds [Abdomen Tenderness] : non-tender [Abdomen Soft] : soft [No CVA Tenderness] : no CVA  tenderness [No Spinal Tenderness] : no spinal tenderness [Involuntary Movements] : no involuntary movements were seen [Normal Affect] : the affect was normal [Normal Mood] : the mood was normal [Normal Gait] : abnormal gait [de-identified] : systolic murmur [de-identified] : no suprapubic tenderness [de-identified] : kyphosis  [de-identified] : rough scaly lesion on left cheek mostly unchanged. 2 cm seborrheic keratosis on L ankle. scattered senile purpura. healed skin tear (scarred) RLE

## 2024-05-17 NOTE — HISTORY OF PRESENT ILLNESS
[Patient reported hearing was normal] : Patient reported hearing was normal [Patient reported vision is abnormal] : Patient reported vision is abnormal [Patient reported Patient reported dental screening is normal] : Patient reported dental screening is normal [Patient reported skin cancer screening was abnormal] : Patient reported skin cancer screening was abnormal [No falls in past year] : Patient reported no falls in the past year [Completely Dependent] : Completely dependent. [0] : 2) Feeling down, depressed, or hopeless: Not at all (0) [PHQ-2 Negative - No further assessment needed] : PHQ-2 Negative - No further assessment needed [With Patient/Caregiver] : , with patient/caregiver [Reviewed updated] : Reviewed, updated [Designated Healthcare Proxy] : Designated healthcare proxy [Name: ___] : Health Care Proxy's Name: [unfilled]  [Relationship: ___] : Relationship: [unfilled] [FreeTextEntry1] : 11/23 [ERF1Ssxly] : 0 [AdvancecareDate] : 12/23

## 2024-05-21 LAB
ANION GAP SERPL CALC-SCNC: 12 MMOL/L
BASOPHILS # BLD AUTO: 0.04 K/UL
BASOPHILS NFR BLD AUTO: 0.4 %
BUN SERPL-MCNC: 46 MG/DL
CALCIUM SERPL-MCNC: 9.8 MG/DL
CHLORIDE SERPL-SCNC: 101 MMOL/L
CO2 SERPL-SCNC: 20 MMOL/L
CREAT SERPL-MCNC: 1.24 MG/DL
EGFR: 39 ML/MIN/1.73M2
EOSINOPHIL # BLD AUTO: 0.17 K/UL
EOSINOPHIL NFR BLD AUTO: 1.5 %
GLUCOSE SERPL-MCNC: 85 MG/DL
HCT VFR BLD CALC: 34.1 %
HGB BLD-MCNC: 10 G/DL
IMM GRANULOCYTES NFR BLD AUTO: 0.4 %
LYMPHOCYTES # BLD AUTO: 1.39 K/UL
LYMPHOCYTES NFR BLD AUTO: 12.4 %
MAN DIFF?: NORMAL
MCHC RBC-ENTMCNC: 28.6 PG
MCHC RBC-ENTMCNC: 29.3 GM/DL
MCV RBC AUTO: 97.4 FL
MONOCYTES # BLD AUTO: 0.79 K/UL
MONOCYTES NFR BLD AUTO: 7.1 %
NEUTROPHILS # BLD AUTO: 8.75 K/UL
NEUTROPHILS NFR BLD AUTO: 78.2 %
PLATELET # BLD AUTO: 284 K/UL
POTASSIUM SERPL-SCNC: 4.8 MMOL/L
RBC # BLD: 3.5 M/UL
RBC # FLD: 14.5 %
SODIUM SERPL-SCNC: 133 MMOL/L
T3 SERPL-MCNC: 83 NG/DL
T4 FREE SERPL-MCNC: 1.1 NG/DL
TSH SERPL-ACNC: 0.11 UIU/ML
WBC # FLD AUTO: 11.19 K/UL

## 2024-06-11 ENCOUNTER — APPOINTMENT (OUTPATIENT)
Dept: GERIATRICS | Facility: CLINIC | Age: 89
End: 2024-06-11
Payer: MEDICARE

## 2024-06-11 DIAGNOSIS — U07.1 COVID-19: ICD-10-CM

## 2024-06-11 DIAGNOSIS — R05.1 ACUTE COUGH: ICD-10-CM

## 2024-06-11 DIAGNOSIS — R49.0 DYSPHONIA: ICD-10-CM

## 2024-06-11 PROCEDURE — 99443: CPT | Mod: 93

## 2024-06-11 RX ORDER — NIRMATRELVIR AND RITONAVIR 150-100 MG
10 X 150 MG & KIT ORAL
Qty: 1 | Refills: 0 | Status: ACTIVE | COMMUNITY
Start: 2024-06-11 | End: 1900-01-01

## 2024-06-11 RX ORDER — GUAIFENESIN AND DEXTROMETHORPHAN HYDROBROMIDE 1200; 60 MG/1; MG/1
60-1200 TABLET, EXTENDED RELEASE ORAL
Qty: 1 | Refills: 0 | Status: ACTIVE | COMMUNITY
Start: 2024-06-11 | End: 1900-01-01

## 2024-06-11 RX ORDER — SODIUM CHLORIDE 0.65 %
0.65 AEROSOL, SPRAY (ML) NASAL TWICE DAILY
Qty: 1 | Refills: 0 | Status: ACTIVE | COMMUNITY
Start: 2024-06-11 | End: 1900-01-01

## 2024-06-11 RX ORDER — BENZONATATE 200 MG/1
200 CAPSULE ORAL
Qty: 1 | Refills: 0 | Status: ACTIVE | COMMUNITY
Start: 2024-06-11 | End: 1900-01-01

## 2024-06-25 RX ORDER — VALSARTAN 80 MG/1
80 TABLET, COATED ORAL DAILY
Qty: 90 | Refills: 1 | Status: ACTIVE | COMMUNITY
Start: 2019-09-03 | End: 1900-01-01

## 2024-07-08 ENCOUNTER — RX RENEWAL (OUTPATIENT)
Age: 89
End: 2024-07-08

## 2024-10-01 ENCOUNTER — APPOINTMENT (OUTPATIENT)
Dept: GERIATRICS | Facility: CLINIC | Age: 89
End: 2024-10-01
Payer: MEDICARE

## 2024-10-01 ENCOUNTER — MED ADMIN CHARGE (OUTPATIENT)
Age: 89
End: 2024-10-01

## 2024-10-01 VITALS
RESPIRATION RATE: 16 BRPM | HEART RATE: 92 BPM | BODY MASS INDEX: 26.15 KG/M2 | HEIGHT: 55 IN | SYSTOLIC BLOOD PRESSURE: 159 MMHG | OXYGEN SATURATION: 98 % | WEIGHT: 113 LBS | TEMPERATURE: 97.2 F | DIASTOLIC BLOOD PRESSURE: 72 MMHG

## 2024-10-01 DIAGNOSIS — K59.00 CONSTIPATION, UNSPECIFIED: ICD-10-CM

## 2024-10-01 DIAGNOSIS — Z23 ENCOUNTER FOR IMMUNIZATION: ICD-10-CM

## 2024-10-01 DIAGNOSIS — F41.8 OTHER SPECIFIED ANXIETY DISORDERS: ICD-10-CM

## 2024-10-01 DIAGNOSIS — F03.A0 UNSPECIFIED DEMENTIA, MILD, WITHOUT BEHAVIORAL DISTURBANCE, PSYCHOTIC DISTURBANCE, MOOD DISTURBANCE, AND ANXIETY: ICD-10-CM

## 2024-10-01 DIAGNOSIS — M35.3 POLYMYALGIA RHEUMATICA: ICD-10-CM

## 2024-10-01 DIAGNOSIS — D64.9 ANEMIA, UNSPECIFIED: ICD-10-CM

## 2024-10-01 DIAGNOSIS — G47.9 SLEEP DISORDER, UNSPECIFIED: ICD-10-CM

## 2024-10-01 DIAGNOSIS — I10 ESSENTIAL (PRIMARY) HYPERTENSION: ICD-10-CM

## 2024-10-01 DIAGNOSIS — I35.0 NONRHEUMATIC AORTIC (VALVE) STENOSIS: ICD-10-CM

## 2024-10-01 PROCEDURE — G0008: CPT

## 2024-10-01 PROCEDURE — 90662 IIV NO PRSV INCREASED AG IM: CPT

## 2024-10-01 PROCEDURE — 99214 OFFICE O/P EST MOD 30 MIN: CPT | Mod: GC

## 2024-10-01 PROCEDURE — G2211 COMPLEX E/M VISIT ADD ON: CPT

## 2024-10-01 RX ORDER — GLUCOSAMINE HCL/CHONDROITIN SU 500-400 MG
3 CAPSULE ORAL
Qty: 90 | Refills: 0 | Status: ACTIVE | COMMUNITY
Start: 2024-10-01 | End: 1900-01-01

## 2024-10-01 NOTE — HISTORY OF PRESENT ILLNESS
[Patient reported hearing was normal] : Patient reported hearing was normal [Patient reported vision is abnormal] : Patient reported vision is abnormal [Patient reported Patient reported dental screening is normal] : Patient reported dental screening is normal [Patient reported skin cancer screening was abnormal] : Patient reported skin cancer screening was abnormal [No falls in past year] : Patient reported no falls in the past year [Completely Dependent] : Completely dependent. [0] : 2) Feeling down, depressed, or hopeless: Not at all (0) [PHQ-2 Negative - No further assessment needed] : PHQ-2 Negative - No further assessment needed [With Patient/Caregiver] : , with patient/caregiver [Reviewed updated] : Reviewed, updated [Designated Healthcare Proxy] : Designated healthcare proxy [Name: ___] : Health Care Proxy's Name: [unfilled]  [Relationship: ___] : Relationship: [unfilled] [FreeTextEntry1] : 11/23 [[id number]] : 0 [AdvancecareDate] : 12/23

## 2024-10-01 NOTE — PHYSICAL EXAM
[Alert] : alert [Normal Voice/Communication] : normal voice/communication [Normal Oral Mucosa] : normal oral mucosa [No Respiratory Distress] : no respiratory distress [No Acc Muscle Use] : no accessory muscle use [Respiration, Rhythm And Depth] : normal respiratory rhythm and effort [Auscultation Breath Sounds / Voice Sounds] : lungs were clear to auscultation bilaterally [Normal S1, S2] : normal S1 and S2 [Heart Rate And Rhythm] : heart rate was normal and rhythm regular [Systolic grade ___/6] : A grade [unfilled]/6 systolic murmur was heard. [Edema] : edema was not present [No Nipple Discharge] : no nipple discharge [Bowel Sounds] : normal bowel sounds [Abdomen Tenderness] : non-tender [Abdomen Soft] : soft [No CVA Tenderness] : no CVA  tenderness [No Spinal Tenderness] : no spinal tenderness [Involuntary Movements] : no involuntary movements were seen [Normal Affect] : the affect was normal [Normal Mood] : the mood was normal [Normal Gait] : abnormal gait [de-identified] : systolic murmur [de-identified] : no suprapubic tenderness [de-identified] : kyphosis  [de-identified] : rough scaly lesion on left cheek mostly unchanged. 2 cm seborrheic keratosis on L ankle. scattered senile purpura. healed skin tear (scarred) RLE

## 2024-10-01 NOTE — END OF VISIT
[] : Fellow [FreeTextEntry3] : 89 year old woman w/ PMH as above presents for follow-up visit.  Patient overall is stable.  She is accompanied by her to home health aide to provide collateral history.  Relatively unchanged clinically compared to her last visit.  She continues to wake up multiple times at night.  This is related to the urge to urinate but sometimes he does not urinate.  Hesitant to start any sleep aids given her comorbidities.  It is reasonable for a trial of low-dose melatonin, although I counseled that the rate of success may not be very high.  We will check labs, including TFTs.  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  We have not pursued further aggressive workup as per conversations with the patient's healthcare proxy/son.  Also will check CBC and CMP.  Flu shot to be given today.  Return to the clinic in 3 months

## 2024-10-03 LAB
ANION GAP SERPL CALC-SCNC: 14 MMOL/L
BASOPHILS # BLD AUTO: 0.04 K/UL
BASOPHILS NFR BLD AUTO: 0.4 %
BUN SERPL-MCNC: 29 MG/DL
CALCIUM SERPL-MCNC: 10.3 MG/DL
CHLORIDE SERPL-SCNC: 99 MMOL/L
CO2 SERPL-SCNC: 26 MMOL/L
CREAT SERPL-MCNC: 1.5 MG/DL
EGFR: 31 ML/MIN/1.73M2
EOSINOPHIL # BLD AUTO: 0.06 K/UL
EOSINOPHIL NFR BLD AUTO: 0.6 %
GLUCOSE SERPL-MCNC: 77 MG/DL
HCT VFR BLD CALC: 33.7 %
HGB BLD-MCNC: 10.2 G/DL
IMM GRANULOCYTES NFR BLD AUTO: 0.4 %
LYMPHOCYTES # BLD AUTO: 1.08 K/UL
LYMPHOCYTES NFR BLD AUTO: 10.5 %
MAN DIFF?: NORMAL
MCHC RBC-ENTMCNC: 28.8 PG
MCHC RBC-ENTMCNC: 30.3 GM/DL
MCV RBC AUTO: 95.2 FL
MONOCYTES # BLD AUTO: 0.49 K/UL
MONOCYTES NFR BLD AUTO: 4.7 %
NEUTROPHILS # BLD AUTO: 8.61 K/UL
NEUTROPHILS NFR BLD AUTO: 83.4 %
PLATELET # BLD AUTO: 245 K/UL
POTASSIUM SERPL-SCNC: 4.9 MMOL/L
RBC # BLD: 3.54 M/UL
RBC # FLD: 15.3 %
SODIUM SERPL-SCNC: 140 MMOL/L
T3 SERPL-MCNC: 87 NG/DL
T4 FREE SERPL-MCNC: 1.4 NG/DL
TSH SERPL-ACNC: 1.96 UIU/ML
WBC # FLD AUTO: 10.32 K/UL

## 2024-11-13 ENCOUNTER — APPOINTMENT (OUTPATIENT)
Dept: COLORECTAL SURGERY | Facility: CLINIC | Age: 89
End: 2024-11-13
Payer: MEDICARE

## 2024-11-13 DIAGNOSIS — K64.8 OTHER HEMORRHOIDS: ICD-10-CM

## 2024-11-13 PROCEDURE — 99203 OFFICE O/P NEW LOW 30 MIN: CPT

## 2024-11-13 RX ORDER — HYDROCORTISONE 25 MG/G
2.5 CREAM TOPICAL
Qty: 1 | Refills: 5 | Status: ACTIVE | COMMUNITY
Start: 2024-11-13 | End: 1900-01-01

## 2025-02-11 ENCOUNTER — APPOINTMENT (OUTPATIENT)
Dept: GERIATRICS | Facility: CLINIC | Age: 89
End: 2025-02-11
Payer: MEDICARE

## 2025-02-11 ENCOUNTER — EMERGENCY (EMERGENCY)
Facility: HOSPITAL | Age: 89
LOS: 1 days | Discharge: HOME | End: 2025-02-11
Attending: STUDENT IN AN ORGANIZED HEALTH CARE EDUCATION/TRAINING PROGRAM | Admitting: STUDENT IN AN ORGANIZED HEALTH CARE EDUCATION/TRAINING PROGRAM
Payer: MEDICARE

## 2025-02-11 VITALS
HEIGHT: 55 IN | BODY MASS INDEX: 25.46 KG/M2 | HEART RATE: 89 BPM | SYSTOLIC BLOOD PRESSURE: 150 MMHG | WEIGHT: 110 LBS | RESPIRATION RATE: 16 BRPM | OXYGEN SATURATION: 97 % | DIASTOLIC BLOOD PRESSURE: 56 MMHG

## 2025-02-11 VITALS
DIASTOLIC BLOOD PRESSURE: 68 MMHG | RESPIRATION RATE: 16 BRPM | HEART RATE: 85 BPM | WEIGHT: 110.01 LBS | TEMPERATURE: 98 F | HEIGHT: 59 IN | OXYGEN SATURATION: 95 % | SYSTOLIC BLOOD PRESSURE: 156 MMHG

## 2025-02-11 VITALS — TEMPERATURE: 101.2 F

## 2025-02-11 DIAGNOSIS — L02.91 CUTANEOUS ABSCESS, UNSPECIFIED: ICD-10-CM

## 2025-02-11 DIAGNOSIS — R50.9 FEVER, UNSPECIFIED: ICD-10-CM

## 2025-02-11 DIAGNOSIS — I10 ESSENTIAL (PRIMARY) HYPERTENSION: ICD-10-CM

## 2025-02-11 LAB
ALBUMIN SERPL ELPH-MCNC: 3.5 G/DL — SIGNIFICANT CHANGE UP (ref 3.3–5)
ALP SERPL-CCNC: 43 U/L — SIGNIFICANT CHANGE UP (ref 40–120)
ALT FLD-CCNC: 8 U/L — SIGNIFICANT CHANGE UP (ref 4–33)
ANION GAP SERPL CALC-SCNC: 12 MMOL/L — SIGNIFICANT CHANGE UP (ref 7–14)
AST SERPL-CCNC: 12 U/L — SIGNIFICANT CHANGE UP (ref 4–32)
BASOPHILS # BLD AUTO: 0.03 K/UL — SIGNIFICANT CHANGE UP (ref 0–0.2)
BASOPHILS NFR BLD AUTO: 0.3 % — SIGNIFICANT CHANGE UP (ref 0–2)
BILIRUB SERPL-MCNC: 0.2 MG/DL — SIGNIFICANT CHANGE UP (ref 0.2–1.2)
BUN SERPL-MCNC: 53 MG/DL — HIGH (ref 7–23)
CALCIUM SERPL-MCNC: 10.1 MG/DL — SIGNIFICANT CHANGE UP (ref 8.4–10.5)
CHLORIDE SERPL-SCNC: 112 MMOL/L — HIGH (ref 98–107)
CO2 SERPL-SCNC: 16 MMOL/L — LOW (ref 22–31)
CREAT SERPL-MCNC: 1.16 MG/DL — SIGNIFICANT CHANGE UP (ref 0.5–1.3)
EGFR: 42 ML/MIN/1.73M2 — LOW
EOSINOPHIL # BLD AUTO: 0.12 K/UL — SIGNIFICANT CHANGE UP (ref 0–0.5)
EOSINOPHIL NFR BLD AUTO: 1.3 % — SIGNIFICANT CHANGE UP (ref 0–6)
GLUCOSE SERPL-MCNC: 96 MG/DL — SIGNIFICANT CHANGE UP (ref 70–99)
HCT VFR BLD CALC: 32.1 % — LOW (ref 34.5–45)
HGB BLD-MCNC: 9.9 G/DL — LOW (ref 11.5–15.5)
IANC: 6.7 K/UL — SIGNIFICANT CHANGE UP (ref 1.8–7.4)
IMM GRANULOCYTES NFR BLD AUTO: 0.4 % — SIGNIFICANT CHANGE UP (ref 0–0.9)
LYMPHOCYTES # BLD AUTO: 1.55 K/UL — SIGNIFICANT CHANGE UP (ref 1–3.3)
LYMPHOCYTES # BLD AUTO: 16.7 % — SIGNIFICANT CHANGE UP (ref 13–44)
MCHC RBC-ENTMCNC: 28.6 PG — SIGNIFICANT CHANGE UP (ref 27–34)
MCHC RBC-ENTMCNC: 30.8 G/DL — LOW (ref 32–36)
MCV RBC AUTO: 92.8 FL — SIGNIFICANT CHANGE UP (ref 80–100)
MONOCYTES # BLD AUTO: 0.83 K/UL — SIGNIFICANT CHANGE UP (ref 0–0.9)
MONOCYTES NFR BLD AUTO: 9 % — SIGNIFICANT CHANGE UP (ref 2–14)
NEUTROPHILS # BLD AUTO: 6.7 K/UL — SIGNIFICANT CHANGE UP (ref 1.8–7.4)
NEUTROPHILS NFR BLD AUTO: 72.3 % — SIGNIFICANT CHANGE UP (ref 43–77)
NRBC # BLD AUTO: 0 K/UL — SIGNIFICANT CHANGE UP (ref 0–0)
NRBC # FLD: 0 K/UL — SIGNIFICANT CHANGE UP (ref 0–0)
NRBC BLD AUTO-RTO: 0 /100 WBCS — SIGNIFICANT CHANGE UP (ref 0–0)
PLATELET # BLD AUTO: 245 K/UL — SIGNIFICANT CHANGE UP (ref 150–400)
POTASSIUM SERPL-MCNC: 4.9 MMOL/L — SIGNIFICANT CHANGE UP (ref 3.5–5.3)
POTASSIUM SERPL-SCNC: 4.9 MMOL/L — SIGNIFICANT CHANGE UP (ref 3.5–5.3)
PROT SERPL-MCNC: 6 G/DL — SIGNIFICANT CHANGE UP (ref 6–8.3)
RBC # BLD: 3.46 M/UL — LOW (ref 3.8–5.2)
RBC # FLD: 15 % — HIGH (ref 10.3–14.5)
SODIUM SERPL-SCNC: 140 MMOL/L — SIGNIFICANT CHANGE UP (ref 135–145)
WBC # BLD: 9.27 K/UL — SIGNIFICANT CHANGE UP (ref 3.8–10.5)
WBC # FLD AUTO: 9.27 K/UL — SIGNIFICANT CHANGE UP (ref 3.8–10.5)

## 2025-02-11 PROCEDURE — 99284 EMERGENCY DEPT VISIT MOD MDM: CPT | Mod: FS

## 2025-02-11 PROCEDURE — 99215 OFFICE O/P EST HI 40 MIN: CPT

## 2025-02-11 PROCEDURE — G2211 COMPLEX E/M VISIT ADD ON: CPT

## 2025-02-11 RX ORDER — CEPHALEXIN 250 MG/1
250 TABLET ORAL
Qty: 30 | Refills: 0 | Status: ACTIVE | COMMUNITY
Start: 2025-02-11 | End: 1900-01-01

## 2025-02-11 RX ORDER — DOXYCYCLINE HYCLATE 100 MG/1
1 CAPSULE ORAL
Qty: 14 | Refills: 0
Start: 2025-02-11

## 2025-02-11 RX ORDER — DOXYCYCLINE HYCLATE 100 MG/1
100 CAPSULE ORAL ONCE
Refills: 0 | Status: COMPLETED | OUTPATIENT
Start: 2025-02-11 | End: 2025-02-11

## 2025-02-11 RX ADMIN — DOXYCYCLINE HYCLATE 100 MILLIGRAM(S): 100 CAPSULE ORAL at 22:33

## 2025-02-11 NOTE — ED PROVIDER NOTE - PATIENT PORTAL LINK FT
You can access the FollowMyHealth Patient Portal offered by [facility] by registering at the following website: [email or web address]. By joining [employer]’s FollowMyHealth portal, you will also be able to view your health information using other applications (apps) compatible with our system.

## 2025-02-11 NOTE — ED PROVIDER NOTE - ATTENDING APP SHARED VISIT CONTRIBUTION OF CARE
89-year-old female presents to the ED complaining of possible abscess to the left inner thigh.  It was reported the patient was evaluated at the PCP office today and had a temperature of 100.4 was advised to come to the ER.  Patient states that she otherwise feels fine.  She is having some pain in her inner thigh.  She herself denies fever chills diaphoresis, trauma or any other symptoms.    Physical exam:  Overall well-appearing elderly female in no acute distress  Heart is regular rate and rhythm without murmurs rubs or gallop  Lungs are clear to auscultation all lung field without wheeze rales rhonchi  Abdomen is soft nontender nondistended  Left inner thigh shows an indurated area with overlying erythema with a fluctuant mass.  No streaking or stranding    MDM:  Patient presents to the ED due to left thigh abscess.  Vital signs are stable and afebrile.  Patient is overall well-appearing.  Point-of-care ultrasound shows a 0.5 cm x 0.5 cm abscess with cobblestoning.  This is concerning for an abscess.    Blood work reviewed.  Stable anemia hemoglobin 9.9, no leukocytosis.  Normal renal function.    Will perform I&D and give patient doxycycline.  Dispo pending.

## 2025-02-11 NOTE — ED PROVIDER NOTE - CLINICAL SUMMARY MEDICAL DECISION MAKING FREE TEXT BOX
Patient is a 89-year-old female, history of hypertension, hyperlipidemia accompanied by home health aide presenting for evaluation of possible left inner thigh abscess and fever. on presentation patient well-appearing, afebrile.  Patient did not receive any antipyretics at doctors office prior to arrival.  on exam 2.5 erythematous/indurated region.  With no fluctuance.  Symptoms likely due to cellulitis versus early formation of abscess.  Plan for basic labs, antibiotics.

## 2025-02-11 NOTE — ED PROVIDER NOTE - OBJECTIVE STATEMENT
Patient is a 89-year-old female, history of hypertension, hyperlipidemia accompanied by home health aide presenting for evaluation of possible left inner thigh abscess and fever.  Patient was initially evaluated at PCP office told to have a temperature of 100.4 however I&D was not able to perform in office.  Patient was not treated with any antipyretics.  Home health aide denies recent injury or trauma, shaving prior to the onset of symptoms. Patient without any acute complaints no associated with fever, chills, urinary symptoms, abdominal pain, nausea, vomiting,

## 2025-02-11 NOTE — ED PROVIDER NOTE - SKIN, MLM
left inner thigh: 2.5cm erythematous/indurated region. no fluctuance.  Skin normal color for race, warm, dry and intact. No evidence of rash.

## 2025-02-11 NOTE — ED ADULT NURSE NOTE - OBJECTIVE STATEMENT
Pt arrives to ED A&Ox4, wheelchair bound. Pt C/O abscess on left inner thigh. Pt observed this yesterday. pt states its not painful but it feels uncomfortable. Abscess on left thigh observed to be red, firm, and swollen. Pt denies chest pain, sob, HA, fevers, chills.  PMhx HTN, HLD. Rectal temp of 99.4. Respirations are even and unlabored, 22G IV placed in left wrist. Labs drawn and sent. Bed in lowest position, safety maintained.

## 2025-02-11 NOTE — ED ADULT TRIAGE NOTE - CHIEF COMPLAINT QUOTE
Pt brought by MD office for temp 100.4, and abscess to left inner thigh, unable to drain in office. Pt denies chest pain, sob.  hx HTN, HLD.

## 2025-02-11 NOTE — ED PROVIDER NOTE - NSFOLLOWUPINSTRUCTIONS_ED_ALL_ED_FT
ABSCESS - General Information    Abscess    WHAT YOU NEED TO KNOW:    What is an abscess? An abscess is an area under the skin where pus (infected fluid) collects. An abscess is often caused by bacteria, fungi or other germs that get into an open wound. You can get an abscess anywhere on your body.  Skin Abscess    What increases my risk for an abscess?    An animal bite    A foreign object lodged under your skin    Heavy or frequent sweating    A health problem, such as diabetes or obesity    Injecting illegal drugs  What are the signs and symptoms of an abscess? You may have a swollen mass that is red and painful. Pus may leak out of the mass. The pus will be white or yellow and may smell bad. You may have redness and pain days before the mass appears. You may have a fever and chills if the infection spreads.    How is an abscess diagnosed? Your healthcare provider will examine the area. He or she will check to see if your abscess is draining. A sample of fluid from your abscess may show what is causing your infection.    How is an abscess treated?    Incision and drainage is a procedure used to remove pus and fluid from the abscess. Your healthcare provider will make a cut in the abscess so it can drain. Then gauze will be put into the wound and it will be covered with a bandage.    Surgery may be needed to remove your abscess. Your healthcare provider may do this if the abscess is on your hands or buttocks. Surgery can decrease the risk that the abscess will come back.  What can I do to care for myself?    Apply a warm compress to your abscess. This will help it open and drain. Wet a washcloth in warm, but not hot, water. Apply the compress for 10 minutes. Repeat this 4 times each day. Do not press on an abscess or try to open it with a needle. You may push the bacteria deeper or into your blood.    Do not share your clothes, towels, or sheets with anyone. This can spread the infection to others.    Wash your hands often. This can help prevent the spread of germs. Use soap and water or an alcohol-based hand rub.  Handwashing  What can I do to care for my wound after it is drained?    Care for your wound as directed. If your healthcare provider says it is okay, carefully remove the bandage and gauze packing. You may need to soak the gauze to get it out of your wound. Clean your wound and the area around it as directed. Dry the area and put on new, clean bandages. Change your bandages when they get wet or dirty.    Ask your healthcare provider how to change the gauze in your wound. Keep track of how many pieces of gauze are placed inside the wound. Do not put too much packing in the wound. Do not pack the gauze too tightly in your wound.  When should I seek immediate care?    The area around your abscess becomes very painful, warm, or has red streaks.    You have a fever and chills.    Your heart is beating faster than usual.    You feel faint or confused.  When should I call my doctor?    Your abscess gets bigger.    Your abscess returns.    You have questions or concerns about your condition or care.  CARE AGREEMENT:    You have the right to help plan your care. Learn about your health condition and how it may be treated. Discuss treatment options with your healthcare providers to decide what care you want to receive. You always have the right to refuse treatment.

## 2025-02-11 NOTE — ED ADULT TRIAGE NOTE - TEMPERATURE IN CELSIUS (DEGREES C)
[place] [address] MEDICINE    HISTORY AND PHYSICAL EXAM    PATIENT NAME:  Wen Gleason    MRN:  [id number]  SERVICE DATE:  10/11/2021   SERVICE TIME:  9:46 PM    Primary Care Physician: Jeannie Cam MD     SUBJECTIVE  CHIEF COMPLAINT:  Fall    HPI:  Wen Gleason is a 68 y.o., , male who  has a past medical history of Alcohol abuse, CAD (coronary artery disease), Cancer ([facility] [address] Chronic back pain, Chronic kidney disease, Chronic sinusitis, DJD (degenerative joint disease) of knee, Elevated PSA, History of blood transfusion, History of inferior wall myocardial infarction, HTN (hypertension), Hyperlipidemia, NSTEMI (non-ST elevated myocardial infarction) ([facility] [address] and Smoker. that is hospitalized for frequent falls, dizziness,  humerus fx, hyponatremia, KEILY, hyperkalemia. Shoulder Pain   The pain is present in the right shoulder. This is a new problem. The current episode started today. There has been a history of trauma. The problem occurs constantly. The problem has been gradually worsening. The quality of the pain is described as aching. The pain is at a severity of 6/10. The pain is moderate. Pertinent negatives include no fever or numbness. The symptoms are aggravated by activity. Treatments tried: fentanyl, immobilization. The treatment provided moderate relief. Dizziness  This is a recurrent problem. The current episode started more than 1 year ago. The problem occurs intermittently. The problem has been resolved. Associated symptoms include weakness. Pertinent negatives include no abdominal pain, chest pain, chills, coughing, diaphoresis, fatigue, fever, headaches, nausea, numbness, sore throat or vomiting. The symptoms are aggravated by walking. The treatment provided no relief. Presents after fall early this AM was unable to get up on his own. He states in ER he may have had loss of consciousness. He states he falls frequently, gets \"sudden onset dizziness and then I just go down\". Freddy Cruz PERSONA  performed by Wan Haider MD at [address] [place] [address] Avenue:    Family History   Problem Relation Age of Onset    Osteoarthritis Mother     Emphysema Mother     Hypertension Father     Hearing Loss Father     Dementia Father     No Known Problems Sister     Prostate Cancer Brother     Colon Polyps Neg Hx      SOCIAL HISTORY:    Social History     Socioeconomic History    Marital status:      Spouse name: Not on file    Number of children: Not on file    Years of education: Not on file    Highest education level: Not on file   Occupational History    Occupation: retired   Tobacco Use    Smoking status: Current Every Day Smoker     Packs/day: 0.50     Years: 60.00     Pack years: 30.00     Types: Cigarettes    Smokeless tobacco: Never Used    Tobacco comment: also smokes cigars   Vaping Use    Vaping Use: Never used   Substance and Sexual Activity    Alcohol use: Not Currently     Alcohol/week: 12.0 standard drinks     Types: 12 Shots of liquor per week     Comment: quit drinking aug 18th, 2021    Drug use: No    Sexual activity: Yes   Other Topics Concern    Not on file   Social History Narrative    Lives alone     Social Determinants of Health     Financial Resource Strain: Low Risk     Difficulty of Paying Living Expenses: Not very hard   Food Insecurity: No Food Insecurity    Worried About Running Out of Food in the Last Year: Never true    Yoav of Food in the Last Year: Never true   Transportation Needs: No Transportation Needs    Lack of Transportation (Medical): No    Lack of Transportation (Non-Medical):  No   Physical Activity:     Days of Exercise per Week:     Minutes of Exercise per Session:    Stress:     Feeling of Stress :    Social Connections:     Frequency of Communication with Friends and Family:     Frequency of Social Gatherings with Friends and Family:     Attends Ten[employer] Services:     Active Member of [address] [place] [address] or Organizations:     Attends Club or Organization Meetings:     Marital Status:    Intimate Partner Violence:     Fear of Current or Ex-Partner:     Emotionally Abused:     Physically Abused:     Sexually Abused:      MEDICATIONS:   Prior to Admission medications    Medication Sig Start Date End Date Taking?  Authorizing Provider   amoxicillin-clavulanate (AUGMENTIN) 875-125 MG per tablet Take 1 tablet by mouth 2 times daily 9/27/21 10/27/21  Reece Maloney MD   sulfamethoxazole-trimethoprim (BACTRIM DS) 800-160 MG per tablet Take 1 tablet by mouth 2 times daily 9/27/21 10/27/21  Dav Palafox MD   omeprazole (PRILOSEC) 10 MG delayed release capsule TAKE ONE CAPSULE BY MOUTH DAILY 9/23/21   Christian Pineda MD   SODIUM CHLORIDE, EXTERNAL, 0.9 % SOLN Apply 1 Applicatorful topically daily 8/17/21   Sara Moran DPM   finasteride (PROSCAR) 5 MG tablet Take 1 tablet by mouth daily 8/12/21   Merrill Maxwell MD   citalopram (CELEXA) 40 MG tablet TAKE ONE TABLET BY MOUTH nightly 6/29/21   Tess Addison MD   diclofenac (VOLTAREN) 50 MG EC tablet TAKE ONE TABLET BY MOUTH TWO TIMES A DAY 6/29/21   Tess Addison MD   tiotropium (SPIRIVA RESPIMAT) 2.5 MCG/ACT AERS inhaler Inhale 2 puffs into the lungs daily 5/19/21   Shadia Lopez MD   isosorbide mononitrate (IMDUR) 30 MG extended release tablet TAKE ONE TABLET BY MOUTH EVERY DAY 4/22/21   Christian Pineda MD   metoprolol tartrate (LOPRESSOR) 50 MG tablet Take 0.5 tablets by mouth 2 times daily TAKE ONE TABLET BY MOUTH TWO TIMES A DAY 4/22/21   Christian Pineda MD   Fluticasone furoate-vilanterol (BREO ELLIPTA) 200-25 MCG/INH AEPB inhaler Inhale 1 puff into the lungs daily 2/23/21   Shadia Lopez MD   clopidogrel (PLAVIX) 75 MG tablet TAKE ONE TABLET BY MOUTH DAILY 11/17/20   Christian Pineda MD   atorvastatin (LIPITOR) 20 MG tablet TAKE ONE TABLET BY MOUTH DAILY 11/17/20   Christian Pineda MD   oxyCODONE-acetaminophen (PERCOCET) 7.5-325 MG per tablet TAKE ONE TABLET BY MOUTH THREE TIMES A DAY 10/20/20   Historical Provider, MD   bumetanide (BUMEX) 1 MG tablet Take 1 tablet by mouth daily 10/14/20   Herminio Foster MD   albuterol sulfate  (90 Base) MCG/ACT inhaler Inhale 2 puffs into the lungs every 6 hours as needed for Wheezing 7/2/20   Carmen Sweet MD   morphine (MS CONTIN) 15 MG extended release tablet Take 15 mg by mouth as needed. 6/25/20   Historical Provider, MD   nitroGLYCERIN (NITROSTAT) 0.4 MG SL tablet Place 1 tablet under the tongue every 5 minutes as needed for Chest pain up to max of 3 total doses. If no relief after 1 dose, call 911. 5/1/19   Herminio Foster MD   DOCUSATE CALCIUM PO Take by mouth as needed    Historical Provider, MD       ALLERGIES: Patient has no known allergies. REVIEW OF SYSTEM:   Review of Systems   Constitutional: Negative for chills, diaphoresis, fatigue and fever. HENT: Negative for sore throat and trouble swallowing. Eyes: Negative. Respiratory: Negative for cough, chest tightness, shortness of breath and wheezing. Cardiovascular: Negative for chest pain and palpitations. Gastrointestinal: Negative for abdominal pain, constipation, diarrhea, nausea and vomiting. Endocrine: Negative. Genitourinary: Negative for dysuria, flank pain and urgency. Musculoskeletal: Positive for back pain. Frequent falls. Skin: Negative. Neurological: Positive for dizziness and weakness. Negative for syncope, speech difficulty, numbness and headaches. Psychiatric/Behavioral: Negative. Negative for confusion. OBJECTIVE  PHYSICAL EXAM:   Physical Exam  Vitals and nursing note reviewed. Constitutional:       Appearance: Normal appearance. He is normal weight. HENT:      Head: Normocephalic and atraumatic. Nose: Nose normal.      Mouth/Throat:      Mouth: Mucous membranes are moist.      Pharynx: Oropharynx is clear.    Eyes:      Conjunctiva/sclera: Conjunctivae normal.   Neck:      Vascular: No carotid bruit. Cardiovascular:      Rate and Rhythm: Normal rate and regular rhythm. Pulses: Normal pulses. Heart sounds: Normal heart sounds. Pulmonary:      Effort: Pulmonary effort is normal.      Breath sounds: Normal breath sounds. Abdominal:      General: Bowel sounds are normal.      Palpations: Abdomen is soft. Musculoskeletal:         General: Signs of injury present. Normal range of motion. Right lower leg: No edema. Left lower leg: No edema. Lymphadenopathy:      Cervical: No cervical adenopathy. Skin:     General: Skin is warm and dry. Capillary Refill: Capillary refill takes less than 2 seconds. Neurological:      Mental Status: He is alert and oriented to person, place, and time. Psychiatric:         Mood and Affect: Mood normal.         Behavior: Behavior normal.          BP (!) 147/48   Pulse 85   Temp 98.2 °F (36.8 °C)   Resp 18   Ht 5' 6\" (1.676 m)   Wt 150 lb (68 kg)   SpO2 100%   BMI 24.21 kg/m²     DATA:     Diagnostic tests reviewed for today's visit:    Most recent labs and imaging results reviewed.      LABS:    Recent Results (from the past 24 hour(s))   Comprehensive Metabolic Panel    Collection Time: 10/11/21  6:30 PM   Result Value Ref Range    Sodium 122 (L) 135 - 144 mEq/L    Potassium 5.9 (H) 3.4 - 4.9 mEq/L    Chloride 92 (L) 95 - 107 mEq/L    CO2 16 (L) 20 - 31 mEq/L    Anion Gap 14 9 - 15 mEq/L    Glucose 147 (H) 70 - 99 mg/dL    BUN 25 (H) 8 - 23 mg/dL    CREATININE 1.73 (H) 0.70 - 1.20 mg/dL    GFR Non-African American 38.4 (L) >60    GFR  46.5 (L) >60    Calcium 9.3 8.5 - 9.9 mg/dL    Total Protein 6.6 6.3 - 8.0 g/dL    Albumin 3.9 3.5 - 4.6 g/dL    Total Bilirubin 0.7 0.2 - 0.7 mg/dL    Alkaline Phosphatase 104 35 - 104 U/L    ALT 12 0 - 41 U/L    AST 19 0 - 40 U/L    Globulin 2.7 2.3 - 3.5 g/dL   Ethanol    Collection Time: 10/11/21  6:30 PM   Result Value Ref Range    Ethanol Lvl <10 mg/dL    Ethanol percent Not indicated G/dL   Troponin    Collection Time: 10/11/21  6:30 PM   Result Value Ref Range    Troponin <0.010 0.000 - 0.010 ng/mL   Urine Reflex to Culture    Collection Time: 10/11/21  6:30 PM    Specimen: Urine, clean catch   Result Value Ref Range    Color, UA Yellow Straw/Yellow    Clarity, UA Clear Clear    Glucose, Ur Negative Negative mg/dL    Bilirubin Urine Negative Negative    Ketones, Urine Negative Negative mg/dL    Specific Gravity, UA 1.017 1.005 - 1.030    Blood, Urine MODERATE (A) Negative    pH, UA 5.5 5.0 - 9.0    Protein, UA TRACE (A) Negative mg/dL    Urobilinogen, Urine 0.2 <2.0 E.U./dL    Nitrite, Urine Negative Negative    Leukocyte Esterase, Urine Negative Negative    Urine Reflex to Culture Not Indicated    CK    Collection Time: 10/11/21  6:30 PM   Result Value Ref Range    Total  (H) 0 - 190 U/L   CKMB & RELATIVE PERCENT    Collection Time: 10/11/21  6:30 PM   Result Value Ref Range    CK-MB 7.8 (H) 0.0 - 6.7 ng/mL    CK-MB Index 1.7 0.0 - 3.5 %   Microscopic Urinalysis    Collection Time: 10/11/21  6:30 PM   Result Value Ref Range    Bacteria, UA Negative Negative /HPF    Hyaline Casts, UA 0-1 0 - 5 /HPF    WBC, UA 0-2 0 - 5 /HPF    RBC, UA 3-5 (A) 0 - 5 /HPF    Epithelial Cells, UA 0-2 0 - 5 /HPF   POCT CREATININE    Collection Time: 10/11/21  6:38 PM   Result Value Ref Range    POC CREATININE WHOLE BLOOD 1.0    EKG 12 Lead - Chest Pain    Collection Time: 10/11/21  6:41 PM   Result Value Ref Range    Ventricular Rate 85 BPM    Atrial Rate 85 BPM    P-R Interval 140 ms    QRS Duration 82 ms    Q-T Interval 376 ms    QTc Calculation (Bazett) 447 ms    P Axis 8 degrees    R Axis 50 degrees    T Axis 38 degrees   SPECIMEN REJECTION    Collection Time: 10/11/21  7:22 PM   Result Value Ref Range    Rejected Test CBCWD PT PTT     Reason for Rejection see below    Protime-INR    Collection Time: 10/11/21  7:50 PM   Result Value Ref Range    Protime 14.9 12.3 - 14.9 sec    INR 1.2    APTT Collection Time: 10/11/21  7:50 PM   Result Value Ref Range    aPTT 34.3 24.4 - 36.8 sec   CBC Auto Differential    Collection Time: 10/11/21  7:52 PM   Result Value Ref Range    WBC 16.3 (H) 4.8 - 10.8 K/uL    RBC 2.73 (L) 4.70 - 6.10 M/uL    Hemoglobin 8.6 (L) 14.0 - 18.0 g/dL    Hematocrit 25.8 (L) 42.0 - 52.0 %    MCV 94.6 80.0 - 100.0 fL    MCH 31.7 (H) 27.0 - 31.3 pg    MCHC 33.5 33.0 - 37.0 %    RDW 13.0 11.5 - 14.5 %    Platelets 863 972 - 129 K/uL    Neutrophils % 93.0 %    Lymphocytes % 3.1 %    Monocytes % 3.4 %    Eosinophils % 0.0 %    Basophils % 0.5 %    Neutrophils Absolute 15.1 (H) 1.4 - 6.5 K/uL    Lymphocytes Absolute 0.5 (L) 1.0 - 4.8 K/uL    Monocytes Absolute 0.6 0.2 - 0.8 K/uL    Eosinophils Absolute 0.0 0.0 - 0.7 K/uL    Basophils Absolute 0.1 0.0 - 0.2 K/uL       IMAGING:  CT ABDOMEN PELVIS WO CONTRAST Additional Contrast? None    Result Date: 10/11/2021  CT OF THE CHEST, ABDOMEN, AND PELVIS with intravenous contrast medium. HISTORY: Fall. Loss of consciousness. Receiving anticoagulation. Lower extremity weakness. History squamous cell carcinoma, left lower lobe. TECHNICAL FACTORS: CT imaging of the chest, abdomen, and pelvis, was obtained and formatted as 5 mm contiguous axial images from the thoracic inlet through the symphysis pubis. Sagittal and coronal reconstructions obtained during postprocessing. Intravenous contrast medium:  100 ml of Isovue-370 Comparison:  CT chest, September 7, 2021, January 14, 2021. PET/CT, September 23, 2021. CT abdomen pelvis, March 23, 2020 CT OF THE CHEST FINDINGS: Right lung: No nodules, masses, consolidation, pleural effusion, pneumothorax. Left lung: Pleural-based, noncalcified, mildly stellate 2.6 x 2.9 cm nodule, left lower lobe, again identified, unchanged. No consolidation, pleural effusion, pneumothorax. Lymph nodes: No hilar, mediastinal, or axillary lymph node enlargement. Thoracic aorta: Normal in course and caliber.  Cardiac Size: Normal. Pericardial effusion: None. Musculoskeletal:No osteoblastic, and no osteolytic lesions. Remote fractures left sixth through eighth ribs. 2.6 x 2.9 cm pleural-based left lower lobe nodule, in patient with known clinical history squamous cell carcinoma left lower lobe. No acute fractures. Remote fractures left sixth through eighth ribs CT OF THE ABDOMEN AND PELVIS WITH INTRAVENOUS CONTRAST MEDIUM. FINDINGS: Liver:  Normal in size, shape, and attenuation. Bile Ducts:  Normal in caliber. Gallbladder:  No stones or wall thickening. Pancreas:  Normal without masses, cysts, ductal dilatation or calcification. Spleen:  Surgically absent. Kidneys:  Normal in size. No hydronephrosis, masses, or stones. Mild bilateral perinephric fat stranding. Adrenals:  Normal. Stomach: Exophytic, calcified 1.6 cm nodule, gastric cardia again identified, unchanged. Small bowel:  Normal in caliber. Appendix:  Normal. Colon:  Normal in caliber. Peritoneum:  No ascites, free air, or fluid collections. Vessels: Aorta normal in course and caliber. Lymph nodes:  Retroperitoneal:  No enlarged retroperitoneal lymph nodes. Mesenteric:  No enlarged mesenteric lymph nodes. Pelvic: No enlarged pelvic lymph nodes. Ureters: Normal in course and caliber. No calcifications. Bladder: No wall thickening. Reproductive organs: No pelvic masses. Abdominal Wall: Increased attenuation measuring 4.5 x 4.6 x 8.2 cm within right pelvic soft tissue (series 2, image 89). Bones:  No bone lesions. Lumbar scoliosis convexity to left apex L3. Remote internal fixation L4, and L5. Remote left hip replacement. IMPRESSION: Soft tissue hematoma, right pelvis, measuring 4.5 x 4.6 x 8.2 cm. Splenectomy. No change, exophytic, calcified gastric nodule as discussed. Remote internal fixation L4 and L5. Left hip replacement.  All CT scans at this facility use dose modulation, iterative reconstruction, and/or weight based dosing when appropriate to reduce radiation dose to as low as reasonably achievable. CT Head WO Contrast    Result Date: 10/11/2021  CT Brain. Contrast medium:  without contrast.. History:  Fall. Loss of consciousness. Invasive squamous cell carcinoma, left lower lobe. Technical factors: CT imaging of the brain was obtained and formatted as 5 mm contiguous axial images. 2.5 mm contiguous axial images were obtained through the osseous structures. Sagittal and coronal reconstruction obtained during postprocessing. Comparison:  CT brain, August 18, 2021. Rika Dasilva Findings: Extra-axial spaces:  Normal. Intracranial hemorrhage:  None. Ventricular system: Ventricles mildly enlarged. Sulci mildly prominent. Basal Cisterns:  Normal. Cerebral Parenchyma: Bilateral symmetric periventricular areas decreased attenuation. Midline Shift:  None. Cerebellum:  Normal. Paranasal sinuses and mastoid air cells:  Normal. Visualized Orbits:  Normal.     Impression: Mild cerebral atrophy. Chronic ischemic white matter disease. All CT scans at this facility use dose modulation, iterative reconstruction, and/or weight based dosing when appropriate to reduce radiation dose to as low as reasonably achievable. CT CHEST WO CONTRAST    Result Date: 10/11/2021  CT OF THE CHEST, ABDOMEN, AND PELVIS with intravenous contrast medium. HISTORY: Fall. Loss of consciousness. Receiving anticoagulation. Lower extremity weakness. History squamous cell carcinoma, left lower lobe. TECHNICAL FACTORS: CT imaging of the chest, abdomen, and pelvis, was obtained and formatted as 5 mm contiguous axial images from the thoracic inlet through the symphysis pubis. Sagittal and coronal reconstructions obtained during postprocessing. Intravenous contrast medium:  100 ml of Isovue-370 Comparison:  CT chest, September 7, 2021, January 14, 2021. PET/CT, September 23, 2021. CT abdomen pelvis, March 23, 2020 CT OF THE CHEST FINDINGS: Right lung: No nodules, masses, consolidation, pleural effusion, pneumothorax.  Left lung: Pleural-based, noncalcified, mildly stellate 2.6 x 2.9 cm nodule, left lower lobe, again identified, unchanged. No consolidation, pleural effusion, pneumothorax. Lymph nodes: No hilar, mediastinal, or axillary lymph node enlargement. Thoracic aorta: Normal in course and caliber. Cardiac Size: Normal. Pericardial effusion: None. Musculoskeletal:No osteoblastic, and no osteolytic lesions. Remote fractures left sixth through eighth ribs. 2.6 x 2.9 cm pleural-based left lower lobe nodule, in patient with known clinical history squamous cell carcinoma left lower lobe. No acute fractures. Remote fractures left sixth through eighth ribs CT OF THE ABDOMEN AND PELVIS WITH INTRAVENOUS CONTRAST MEDIUM. FINDINGS: Liver:  Normal in size, shape, and attenuation. Bile Ducts:  Normal in caliber. Gallbladder:  No stones or wall thickening. Pancreas:  Normal without masses, cysts, ductal dilatation or calcification. Spleen:  Surgically absent. Kidneys:  Normal in size. No hydronephrosis, masses, or stones. Mild bilateral perinephric fat stranding. Adrenals:  Normal. Stomach: Exophytic, calcified 1.6 cm nodule, gastric cardia again identified, unchanged. Small bowel:  Normal in caliber. Appendix:  Normal. Colon:  Normal in caliber. Peritoneum:  No ascites, free air, or fluid collections. Vessels: Aorta normal in course and caliber. Lymph nodes:  Retroperitoneal:  No enlarged retroperitoneal lymph nodes. Mesenteric:  No enlarged mesenteric lymph nodes. Pelvic: No enlarged pelvic lymph nodes. Ureters: Normal in course and caliber. No calcifications. Bladder: No wall thickening. Reproductive organs: No pelvic masses. Abdominal Wall: Increased attenuation measuring 4.5 x 4.6 x 8.2 cm within right pelvic soft tissue (series 2, image 89). Bones:  No bone lesions. Lumbar scoliosis convexity to left apex L3. Remote internal fixation L4, and L5. Remote left hip replacement.  IMPRESSION: Soft tissue hematoma, right pelvis, measuring 4.5 x 4.6 x 8.2 cm. Splenectomy. No change, exophytic, calcified gastric nodule as discussed. Remote internal fixation L4 and L5. Left hip replacement. All CT scans at this facility use dose modulation, iterative reconstruction, and/or weight based dosing when appropriate to reduce radiation dose to as low as reasonably achievable. CT CERVICAL SPINE WO CONTRAST    Result Date: 10/11/2021  CT cervical spine without intravenous contrast medium. HISTORY: Fall. Loss of consciousness. History of invasive squamous carcinoma left lower lobe. TECHNICAL FACTORS: CT cervical spine obtained and formatted as 2.5 mm contiguous axial images from skull base to the level of. Sagittal and coronal reconstructions were obtained during postprocessing. No contrast medium was utilized. COMPARISON: CT cervical spine, August 18, 2021 FINDINGS: Cervical vertebral bodies are normal in height height. Loss cervical lordosis. 4.3 mm anterolisthesis C7 on T1, unchanged. Atlantooccipital articulation maintained. Atlantoaxial interval preserved. Neuroforaminal narrowing, left C2-3, left C3-4, bilateral C4-5, right 7, right C7-T1. Diffuse disc space narrowing, posterior C2-3. Diffuse narrowing L4-5 T5-6, and C6-7. No fractures, dislocations, bone lesions. Limited imaging lung apices without anomaly. Bilateral carotid artery calcification. Soft tissues are without anomaly. No fracture. Severe degenerative change cervical spine. Grade 1 C7 spondylolisthesis, unchanged. Bilateral carotid artery calcification. If clinical concern warrants, carotid sonography may be utilized for further evaluation. All CT scans at this facility use dose modulation, iterative reconstruction, and/or weight based dosing when appropriate to reduce radiation dose to as low as reasonably achievable. CT THORACIC SPINE WO CONTRAST    Result Date: 10/11/2021  CT thoracic spine without intravenous contrast medium. HISTORY: Fall. Thoracic and parathoracic tenderness.  Loss of consciousness. History squamous cell carcinoma left lower lobe. TECHNICAL FACTORS: CT thoracic spine obtained and formatted as 2.5 mm contiguous axial images from skull base to the level of. Sagittal and coronal reconstructions were obtained during postprocessing. No contrast medium was utilized. COMPARISON: CT chest, December 7, 2021. PET/CT, December 23, 2021. FINDINGS: Thoracic vertebral bodies are normal in height and alignment Diffuse mild disc space narrowing. No fractures, dislocations, bone lesions. Limited imaging right and left lung lung zone shows a partially imaged pleural-based noncalcified mildly stellate shaped mass, similar to that found on CT chest from September 7, 2021. Peripherally calcified, exophytic 1.9 cm gastric soft tissue nodule, unchanged. No fracture. Left lower lobe mass. Calcified exophytic gastric mass. All CT scans at this facility use dose modulation, iterative reconstruction, and/or weight based dosing when appropriate to reduce radiation dose to as low as reasonably achievable. .    CT LUMBAR SPINE WO CONTRAST    Result Date: 10/11/2021  CT lumbar spine without intravenous contrast medium. HISTORY: Fall. Back pain. Weakness. History squamous cell carcinoma of lung TECHNICAL FACTORS: CT lumbar spine obtained and formatted as 2.5 mm contiguous axial images from skull base to the level of. Sagittal and coronal reconstructions were obtained during postprocessing. No contrast medium was utilized. COMPARISON: PET/CT, September 23, 2021. CT Lumbar Spine, August 30, 2017. FINDINGS: Remote internal fixation L4 and L5 using posterior bilateral pedicle screws secured bilaterally to vertically oriented rods. Lumbar scoliosis convexity to left apex L3. Loss of height, L3 and L4 vertebral bodies, unchanged. Small anterior osteophytes lower thoracic and lumbar spine. Disc spaces preserved. No fractures, dislocations, bone lesions.  Limited imaging of the abdomen and pelvis without anomaly. No acute fracture. Postsurgical change discussed. All CT scans at this facility use dose modulation, iterative reconstruction, and/or weight based dosing when appropriate to reduce radiation dose to as low as reasonably achievable. VTE Prophylaxis: low molecular weight heparin -  start    ASSESSMENT AND PLAN  Principal Problem:    Hyponatremia      Appears dry, admits to poor fluid intake. His dizziness is chronic,  he does have fatigue. No confusion. Plan: admit  Hydrate. q6 BMP  Active Problems:    Frequent falls  States falls frequently   Sudden onset dizziness, then just falls. Has been seen by neuro in past for ataxia, dysequilibrium   CT w Chronic ischemic white matter disease  Plan: Will ask neuro to see for ongoing complaint of dizziness. Frequent falls. Dizziness   States this is a chronic problem   Sudden onset - \"head feels dizzy - Then I just fall \"   No HA, vision issues. Plan: hydrate,  Ask neuro to see. Fracture of right humerus   From fall early this AM.   Has pain, limited ROM due to pain   Plan: admit  Ortho consult. Pain control,  Arm sling. Hyperkalemia   5.9    No palpitations, chest pain. EKG was NSR, normal EKG    Dehydrated. Plan: hydrate. Check K now. Treat if > 5.5     Chronic bilateral low back pain with bilateral sciatica  Percocet at home. No loss of sensation is LE. Plan: pain control     Chronic kidney disease  Scr 1.73   Baseline is around 0.9   He has urine output. Is receiving fluids. Has been on atbx  Bactrim for osteo left ankle. Plan: hydrate. Hold atbx   Ask ID to see. Leukocytosis   Known osteo of left ankle  He is also s/p splenectomy   Afebrile. WBC 16.3       Plan: monitor    ID to see. Anemia  8.6 / 25.8   MCV 94.6   Last H/H in Sept 2021 w/ 12.7 / 37.9  No obvious bleeding. Takes plavix, cardiac stent. Plan: Will monitor q 6 H/H     CAD Past stent placement.   No complaint of chest pain, palpitations, FELDER. Takes lopressor, imdur  Plavix. Plan: continue meds. NSCLC of left lung    Recurrent after past radiation treatment for same diagnosis     Has seen pulmonary    Plan: follow up as scheduled.            Plan of care discussed with: patient and adult child    SIGNATURE: ELOY Robles - CNP  DATE: October 11, 2021  TIME: 9:46 PM   Ezequiel Larson MD  - supervising physician 36.6

## 2025-02-12 VITALS
DIASTOLIC BLOOD PRESSURE: 85 MMHG | HEART RATE: 72 BPM | RESPIRATION RATE: 18 BRPM | OXYGEN SATURATION: 97 % | TEMPERATURE: 98 F | SYSTOLIC BLOOD PRESSURE: 189 MMHG

## 2025-02-12 NOTE — ED ADULT NURSE REASSESSMENT NOTE - NS ED NURSE REASSESS COMMENT FT1
Pt lying in stretcher, A&Ox4, not in acute distress, respirations are even and unlabored, NSR on monitor, IV removed. Pt dressed and changed. Pending ambulance pickup.

## 2025-02-13 ENCOUNTER — LABORATORY RESULT (OUTPATIENT)
Age: 89
End: 2025-02-13

## 2025-02-13 PROBLEM — R50.9 FEVER: Status: ACTIVE | Noted: 2025-02-13

## 2025-02-18 NOTE — REASON FOR VISIT
Additional Notes: Vaseline, Aquaphor, or oil was recommend after washing and before drying off. The patient was advised to use bar soaps rather than liquid soaps and not to reuse any washcloths or loofah sponges. Render Risk Assessment In Note?: no Detail Level: Zone [Initial Evaluation] : an initial evaluation [Formal Caregiver] : formal caregiver [Family Member] : family member

## 2025-03-11 NOTE — PLAN
Attempted to call patient. Left VM with call back number.     [FreeTextEntry1] : vertigo/BPPV\par blood pressure good\par to get vestibular therapies(Has not had yet\par to check cholesterol\par check sed and crp for pmr\par Flu hp\par \par neck pain can cause dizziness\par trial advil 2 pills three times a day for 1 week

## 2025-04-11 ENCOUNTER — APPOINTMENT (OUTPATIENT)
Dept: GERIATRICS | Facility: CLINIC | Age: 89
End: 2025-04-11
Payer: MEDICARE

## 2025-04-11 VITALS
RESPIRATION RATE: 15 BRPM | TEMPERATURE: 98.3 F | SYSTOLIC BLOOD PRESSURE: 153 MMHG | WEIGHT: 111 LBS | BODY MASS INDEX: 32.5 KG/M2 | OXYGEN SATURATION: 96 % | HEART RATE: 82 BPM | DIASTOLIC BLOOD PRESSURE: 67 MMHG

## 2025-04-11 DIAGNOSIS — I10 ESSENTIAL (PRIMARY) HYPERTENSION: ICD-10-CM

## 2025-04-11 DIAGNOSIS — F41.8 OTHER SPECIFIED ANXIETY DISORDERS: ICD-10-CM

## 2025-04-11 DIAGNOSIS — M35.3 POLYMYALGIA RHEUMATICA: ICD-10-CM

## 2025-04-11 DIAGNOSIS — R05.1 ACUTE COUGH: ICD-10-CM

## 2025-04-11 DIAGNOSIS — L98.9 DISORDER OF THE SKIN AND SUBCUTANEOUS TISSUE, UNSPECIFIED: ICD-10-CM

## 2025-04-11 DIAGNOSIS — R26.9 UNSPECIFIED ABNORMALITIES OF GAIT AND MOBILITY: ICD-10-CM

## 2025-04-11 PROCEDURE — G2211 COMPLEX E/M VISIT ADD ON: CPT

## 2025-04-11 PROCEDURE — 99214 OFFICE O/P EST MOD 30 MIN: CPT

## 2025-04-11 RX ORDER — HYDROCORTISONE 0.5 G/100G
0.5 CREAM TOPICAL 3 TIMES DAILY
Qty: 1 | Refills: 0 | Status: ACTIVE | COMMUNITY
Start: 2025-04-11 | End: 1900-01-01

## 2025-07-14 ENCOUNTER — RX RENEWAL (OUTPATIENT)
Age: 89
End: 2025-07-14

## 2025-07-22 ENCOUNTER — RX RENEWAL (OUTPATIENT)
Age: 89
End: 2025-07-22

## 2025-08-21 ENCOUNTER — APPOINTMENT (OUTPATIENT)
Dept: GERIATRICS | Facility: CLINIC | Age: 89
End: 2025-08-21

## 2025-09-11 ENCOUNTER — LABORATORY RESULT (OUTPATIENT)
Age: 89
End: 2025-09-11

## 2025-09-11 ENCOUNTER — APPOINTMENT (OUTPATIENT)
Dept: GERIATRICS | Facility: CLINIC | Age: 89
End: 2025-09-11
Payer: MEDICARE

## 2025-09-11 VITALS
BODY MASS INDEX: 31.92 KG/M2 | OXYGEN SATURATION: 96 % | RESPIRATION RATE: 16 BRPM | WEIGHT: 109 LBS | TEMPERATURE: 98.2 F | SYSTOLIC BLOOD PRESSURE: 139 MMHG | DIASTOLIC BLOOD PRESSURE: 63 MMHG | HEART RATE: 80 BPM

## 2025-09-11 DIAGNOSIS — Z71.89 OTHER SPECIFIED COUNSELING: ICD-10-CM

## 2025-09-11 DIAGNOSIS — I35.0 NONRHEUMATIC AORTIC (VALVE) STENOSIS: ICD-10-CM

## 2025-09-11 DIAGNOSIS — F03.A0 UNSPECIFIED DEMENTIA, MILD, WITHOUT BEHAVIORAL DISTURBANCE, PSYCHOTIC DISTURBANCE, MOOD DISTURBANCE, AND ANXIETY: ICD-10-CM

## 2025-09-11 DIAGNOSIS — L98.9 DISORDER OF THE SKIN AND SUBCUTANEOUS TISSUE, UNSPECIFIED: ICD-10-CM

## 2025-09-11 DIAGNOSIS — I10 ESSENTIAL (PRIMARY) HYPERTENSION: ICD-10-CM

## 2025-09-11 PROCEDURE — 99214 OFFICE O/P EST MOD 30 MIN: CPT | Mod: 25

## 2025-09-11 PROCEDURE — 99497 ADVNCD CARE PLAN 30 MIN: CPT

## 2025-09-11 RX ORDER — TRIAMCINOLONE ACETONIDE 1 MG/G
0.1 OINTMENT TOPICAL
Qty: 1 | Refills: 1 | Status: ACTIVE | COMMUNITY
Start: 2025-09-11 | End: 1900-01-01

## 2025-09-12 LAB
ALBUMIN SERPL ELPH-MCNC: 3.6 G/DL
ALP BLD-CCNC: 50 U/L
ALT SERPL-CCNC: 11 U/L
ANION GAP SERPL CALC-SCNC: 13 MMOL/L
AST SERPL-CCNC: 11 U/L
BASOPHILS # BLD AUTO: 0.02 K/UL
BASOPHILS NFR BLD AUTO: 0.2 %
BILIRUB SERPL-MCNC: 0.2 MG/DL
BUN SERPL-MCNC: 57 MG/DL
CALCIUM SERPL-MCNC: 10.1 MG/DL
CHLORIDE SERPL-SCNC: 108 MMOL/L
CO2 SERPL-SCNC: 18 MMOL/L
CREAT SERPL-MCNC: 1.14 MG/DL
EGFRCR SERPLBLD CKD-EPI 2021: 43 ML/MIN/1.73M2
EOSINOPHIL # BLD AUTO: 0.09 K/UL
EOSINOPHIL NFR BLD AUTO: 1 %
GLUCOSE SERPL-MCNC: 121 MG/DL
HCT VFR BLD CALC: 31.8 %
HGB BLD-MCNC: 9.9 G/DL
IMM GRANULOCYTES NFR BLD AUTO: 0.4 %
LYMPHOCYTES # BLD AUTO: 0.85 K/UL
LYMPHOCYTES NFR BLD AUTO: 9.4 %
MAN DIFF?: NORMAL
MCHC RBC-ENTMCNC: 29.6 PG
MCHC RBC-ENTMCNC: 31.1 G/DL
MCV RBC AUTO: 94.9 FL
MONOCYTES # BLD AUTO: 0.38 K/UL
MONOCYTES NFR BLD AUTO: 4.2 %
NEUTROPHILS # BLD AUTO: 7.68 K/UL
NEUTROPHILS NFR BLD AUTO: 84.8 %
PLATELET # BLD AUTO: 260 K/UL
POTASSIUM SERPL-SCNC: 5.7 MMOL/L
PROT SERPL-MCNC: 5.9 G/DL
RBC # BLD: 3.35 M/UL
RBC # FLD: 14.5 %
SODIUM SERPL-SCNC: 138 MMOL/L
TSH SERPL-ACNC: 0.22 UIU/ML
WBC # FLD AUTO: 9.06 K/UL

## 2025-09-18 ENCOUNTER — NON-APPOINTMENT (OUTPATIENT)
Age: 89
End: 2025-09-18

## 2025-09-18 ENCOUNTER — APPOINTMENT (OUTPATIENT)
Dept: OPHTHALMOLOGY | Facility: CLINIC | Age: 89
End: 2025-09-18
Payer: MEDICARE

## 2025-09-18 PROCEDURE — 92014 COMPRE OPH EXAM EST PT 1/>: CPT
